# Patient Record
Sex: FEMALE | Race: ASIAN | NOT HISPANIC OR LATINO | URBAN - METROPOLITAN AREA
[De-identification: names, ages, dates, MRNs, and addresses within clinical notes are randomized per-mention and may not be internally consistent; named-entity substitution may affect disease eponyms.]

---

## 2021-01-17 ENCOUNTER — INPATIENT (INPATIENT)
Facility: HOSPITAL | Age: 66
LOS: 5 days | Discharge: HOME | End: 2021-01-23
Attending: INTERNAL MEDICINE | Admitting: INTERNAL MEDICINE
Payer: MEDICAID

## 2021-01-17 VITALS
RESPIRATION RATE: 18 BRPM | DIASTOLIC BLOOD PRESSURE: 70 MMHG | HEART RATE: 117 BPM | SYSTOLIC BLOOD PRESSURE: 158 MMHG | TEMPERATURE: 100 F | OXYGEN SATURATION: 99 %

## 2021-01-17 DIAGNOSIS — Z90.49 ACQUIRED ABSENCE OF OTHER SPECIFIED PARTS OF DIGESTIVE TRACT: Chronic | ICD-10-CM

## 2021-01-17 DIAGNOSIS — K37 UNSPECIFIED APPENDICITIS: ICD-10-CM

## 2021-01-17 LAB
ALBUMIN SERPL ELPH-MCNC: 3.9 G/DL — SIGNIFICANT CHANGE UP (ref 3.3–5)
ALP SERPL-CCNC: 47 U/L — SIGNIFICANT CHANGE UP (ref 40–120)
ALT FLD-CCNC: 22 U/L — SIGNIFICANT CHANGE UP (ref 4–33)
ANION GAP SERPL CALC-SCNC: 14 MMOL/L — SIGNIFICANT CHANGE UP (ref 7–14)
APPEARANCE UR: CLEAR — SIGNIFICANT CHANGE UP
APTT BLD: 42.2 SEC — HIGH (ref 27–36.3)
AST SERPL-CCNC: 17 U/L — SIGNIFICANT CHANGE UP (ref 4–32)
BASE EXCESS BLDV CALC-SCNC: -2 MMOL/L — SIGNIFICANT CHANGE UP (ref -3–2)
BASOPHILS # BLD AUTO: 0.05 K/UL — SIGNIFICANT CHANGE UP (ref 0–0.2)
BASOPHILS NFR BLD AUTO: 0.3 % — SIGNIFICANT CHANGE UP (ref 0–2)
BILIRUB SERPL-MCNC: 1.8 MG/DL — HIGH (ref 0.2–1.2)
BILIRUB UR-MCNC: NEGATIVE — SIGNIFICANT CHANGE UP
BLD GP AB SCN SERPL QL: NEGATIVE — SIGNIFICANT CHANGE UP
BLOOD GAS VENOUS - CREATININE: 1.4 MG/DL — HIGH (ref 0.5–1.3)
BLOOD GAS VENOUS COMPREHENSIVE RESULT: SIGNIFICANT CHANGE UP
BUN SERPL-MCNC: 18 MG/DL — SIGNIFICANT CHANGE UP (ref 7–23)
CALCIUM SERPL-MCNC: 10.3 MG/DL — SIGNIFICANT CHANGE UP (ref 8.4–10.5)
CHLORIDE BLDV-SCNC: 102 MMOL/L — SIGNIFICANT CHANGE UP (ref 96–108)
CHLORIDE SERPL-SCNC: 94 MMOL/L — LOW (ref 98–107)
CO2 SERPL-SCNC: 20 MMOL/L — LOW (ref 22–31)
COLOR SPEC: SIGNIFICANT CHANGE UP
CREAT SERPL-MCNC: 1.36 MG/DL — HIGH (ref 0.5–1.3)
DIFF PNL FLD: NEGATIVE — SIGNIFICANT CHANGE UP
EOSINOPHIL # BLD AUTO: 0.03 K/UL — SIGNIFICANT CHANGE UP (ref 0–0.5)
EOSINOPHIL NFR BLD AUTO: 0.2 % — SIGNIFICANT CHANGE UP (ref 0–6)
GAS PNL BLDV: 126 MMOL/L — LOW (ref 136–146)
GLUCOSE BLDC GLUCOMTR-MCNC: 357 MG/DL — HIGH (ref 70–99)
GLUCOSE BLDC GLUCOMTR-MCNC: 360 MG/DL — HIGH (ref 70–99)
GLUCOSE BLDV-MCNC: 510 MG/DL — CRITICAL HIGH (ref 70–99)
GLUCOSE SERPL-MCNC: 459 MG/DL — CRITICAL HIGH (ref 70–99)
GLUCOSE UR QL: ABNORMAL
HCO3 BLDV-SCNC: 22 MMOL/L — SIGNIFICANT CHANGE UP (ref 20–27)
HCT VFR BLD CALC: 37.7 % — SIGNIFICANT CHANGE UP (ref 34.5–45)
HCT VFR BLDA CALC: 39.7 % — SIGNIFICANT CHANGE UP (ref 34.5–46.5)
HGB BLD CALC-MCNC: 12.9 G/DL — SIGNIFICANT CHANGE UP (ref 11.5–15.5)
HGB BLD-MCNC: 12.4 G/DL — SIGNIFICANT CHANGE UP (ref 11.5–15.5)
IANC: 16.36 K/UL — HIGH (ref 1.5–8.5)
IMM GRANULOCYTES NFR BLD AUTO: 0.7 % — SIGNIFICANT CHANGE UP (ref 0–1.5)
INR BLD: 1.32 RATIO — HIGH (ref 0.88–1.16)
KETONES UR-MCNC: NEGATIVE — SIGNIFICANT CHANGE UP
LACTATE BLDV-MCNC: 2 MMOL/L — SIGNIFICANT CHANGE UP (ref 0.5–2)
LEUKOCYTE ESTERASE UR-ACNC: NEGATIVE — SIGNIFICANT CHANGE UP
LIDOCAIN IGE QN: 36 U/L — SIGNIFICANT CHANGE UP (ref 7–60)
LYMPHOCYTES # BLD AUTO: 11.5 % — LOW (ref 13–44)
LYMPHOCYTES # BLD AUTO: 2.27 K/UL — SIGNIFICANT CHANGE UP (ref 1–3.3)
MCHC RBC-ENTMCNC: 29 PG — SIGNIFICANT CHANGE UP (ref 27–34)
MCHC RBC-ENTMCNC: 32.9 GM/DL — SIGNIFICANT CHANGE UP (ref 32–36)
MCV RBC AUTO: 88.3 FL — SIGNIFICANT CHANGE UP (ref 80–100)
MONOCYTES # BLD AUTO: 0.93 K/UL — HIGH (ref 0–0.9)
MONOCYTES NFR BLD AUTO: 4.7 % — SIGNIFICANT CHANGE UP (ref 2–14)
NEUTROPHILS # BLD AUTO: 16.36 K/UL — HIGH (ref 1.8–7.4)
NEUTROPHILS NFR BLD AUTO: 82.6 % — HIGH (ref 43–77)
NITRITE UR-MCNC: NEGATIVE — SIGNIFICANT CHANGE UP
NRBC # BLD: 0 /100 WBCS — SIGNIFICANT CHANGE UP
NRBC # FLD: 0 K/UL — SIGNIFICANT CHANGE UP
PCO2 BLDV: 39 MMHG — LOW (ref 41–51)
PH BLDV: 7.37 — SIGNIFICANT CHANGE UP (ref 7.32–7.43)
PH UR: 6 — SIGNIFICANT CHANGE UP (ref 5–8)
PLATELET # BLD AUTO: 269 K/UL — SIGNIFICANT CHANGE UP (ref 150–400)
PO2 BLDV: 38 MMHG — SIGNIFICANT CHANGE UP (ref 35–40)
POTASSIUM BLDV-SCNC: 4.1 MMOL/L — SIGNIFICANT CHANGE UP (ref 3.4–4.5)
POTASSIUM SERPL-MCNC: 4.4 MMOL/L — SIGNIFICANT CHANGE UP (ref 3.5–5.3)
POTASSIUM SERPL-SCNC: 4.4 MMOL/L — SIGNIFICANT CHANGE UP (ref 3.5–5.3)
PROT SERPL-MCNC: 7.7 G/DL — SIGNIFICANT CHANGE UP (ref 6–8.3)
PROT UR-MCNC: ABNORMAL
PROTHROM AB SERPL-ACNC: 15 SEC — HIGH (ref 10.6–13.6)
RBC # BLD: 4.27 M/UL — SIGNIFICANT CHANGE UP (ref 3.8–5.2)
RBC # FLD: 12.8 % — SIGNIFICANT CHANGE UP (ref 10.3–14.5)
RH IG SCN BLD-IMP: POSITIVE — SIGNIFICANT CHANGE UP
SAO2 % BLDV: 67.7 % — SIGNIFICANT CHANGE UP (ref 60–85)
SODIUM SERPL-SCNC: 128 MMOL/L — LOW (ref 135–145)
SP GR SPEC: 1.02 — SIGNIFICANT CHANGE UP (ref 1.01–1.02)
UROBILINOGEN FLD QL: SIGNIFICANT CHANGE UP
WBC # BLD: 19.78 K/UL — HIGH (ref 3.8–10.5)
WBC # FLD AUTO: 19.78 K/UL — HIGH (ref 3.8–10.5)

## 2021-01-17 PROCEDURE — 74177 CT ABD & PELVIS W/CONTRAST: CPT | Mod: 26

## 2021-01-17 PROCEDURE — 99285 EMERGENCY DEPT VISIT HI MDM: CPT

## 2021-01-17 PROCEDURE — 71045 X-RAY EXAM CHEST 1 VIEW: CPT | Mod: 26

## 2021-01-17 PROCEDURE — 99232 SBSQ HOSP IP/OBS MODERATE 35: CPT | Mod: GC

## 2021-01-17 RX ORDER — ACETAMINOPHEN 500 MG
650 TABLET ORAL ONCE
Refills: 0 | Status: COMPLETED | OUTPATIENT
Start: 2021-01-17 | End: 2021-01-17

## 2021-01-17 RX ORDER — SODIUM CHLORIDE 9 MG/ML
1000 INJECTION, SOLUTION INTRAVENOUS
Refills: 0 | Status: DISCONTINUED | OUTPATIENT
Start: 2021-01-17 | End: 2021-01-18

## 2021-01-17 RX ORDER — METOPROLOL TARTRATE 50 MG
5 TABLET ORAL EVERY 6 HOURS
Refills: 0 | Status: DISCONTINUED | OUTPATIENT
Start: 2021-01-17 | End: 2021-01-20

## 2021-01-17 RX ORDER — SODIUM CHLORIDE 9 MG/ML
1000 INJECTION, SOLUTION INTRAVENOUS ONCE
Refills: 0 | Status: COMPLETED | OUTPATIENT
Start: 2021-01-17 | End: 2021-01-17

## 2021-01-17 RX ORDER — DEXTROSE 50 % IN WATER 50 %
25 SYRINGE (ML) INTRAVENOUS ONCE
Refills: 0 | Status: DISCONTINUED | OUTPATIENT
Start: 2021-01-17 | End: 2021-01-18

## 2021-01-17 RX ORDER — ACETAMINOPHEN 500 MG
1000 TABLET ORAL EVERY 6 HOURS
Refills: 0 | Status: COMPLETED | OUTPATIENT
Start: 2021-01-17 | End: 2021-01-20

## 2021-01-17 RX ORDER — INSULIN GLARGINE 100 [IU]/ML
6 INJECTION, SOLUTION SUBCUTANEOUS AT BEDTIME
Refills: 0 | Status: DISCONTINUED | OUTPATIENT
Start: 2021-01-17 | End: 2021-01-18

## 2021-01-17 RX ORDER — PIPERACILLIN AND TAZOBACTAM 4; .5 G/20ML; G/20ML
3.38 INJECTION, POWDER, LYOPHILIZED, FOR SOLUTION INTRAVENOUS EVERY 8 HOURS
Refills: 0 | Status: DISCONTINUED | OUTPATIENT
Start: 2021-01-17 | End: 2021-01-18

## 2021-01-17 RX ORDER — GLUCAGON INJECTION, SOLUTION 0.5 MG/.1ML
1 INJECTION, SOLUTION SUBCUTANEOUS ONCE
Refills: 0 | Status: DISCONTINUED | OUTPATIENT
Start: 2021-01-17 | End: 2021-01-18

## 2021-01-17 RX ORDER — PIPERACILLIN AND TAZOBACTAM 4; .5 G/20ML; G/20ML
3.38 INJECTION, POWDER, LYOPHILIZED, FOR SOLUTION INTRAVENOUS ONCE
Refills: 0 | Status: COMPLETED | OUTPATIENT
Start: 2021-01-17 | End: 2021-01-17

## 2021-01-17 RX ORDER — DEXTROSE 50 % IN WATER 50 %
12.5 SYRINGE (ML) INTRAVENOUS ONCE
Refills: 0 | Status: DISCONTINUED | OUTPATIENT
Start: 2021-01-17 | End: 2021-01-18

## 2021-01-17 RX ORDER — DEXTROSE 50 % IN WATER 50 %
15 SYRINGE (ML) INTRAVENOUS ONCE
Refills: 0 | Status: DISCONTINUED | OUTPATIENT
Start: 2021-01-17 | End: 2021-01-18

## 2021-01-17 RX ORDER — MORPHINE SULFATE 50 MG/1
4 CAPSULE, EXTENDED RELEASE ORAL ONCE
Refills: 0 | Status: DISCONTINUED | OUTPATIENT
Start: 2021-01-17 | End: 2021-01-17

## 2021-01-17 RX ORDER — ENOXAPARIN SODIUM 100 MG/ML
40 INJECTION SUBCUTANEOUS DAILY
Refills: 0 | Status: DISCONTINUED | OUTPATIENT
Start: 2021-01-17 | End: 2021-01-21

## 2021-01-17 RX ORDER — SODIUM CHLORIDE 9 MG/ML
1000 INJECTION, SOLUTION INTRAVENOUS
Refills: 0 | Status: DISCONTINUED | OUTPATIENT
Start: 2021-01-17 | End: 2021-01-19

## 2021-01-17 RX ORDER — INSULIN LISPRO 100/ML
VIAL (ML) SUBCUTANEOUS EVERY 6 HOURS
Refills: 0 | Status: DISCONTINUED | OUTPATIENT
Start: 2021-01-17 | End: 2021-01-18

## 2021-01-17 RX ORDER — MORPHINE SULFATE 50 MG/1
0.5 CAPSULE, EXTENDED RELEASE ORAL EVERY 4 HOURS
Refills: 0 | Status: DISCONTINUED | OUTPATIENT
Start: 2021-01-17 | End: 2021-01-21

## 2021-01-17 RX ORDER — KETOROLAC TROMETHAMINE 30 MG/ML
15 SYRINGE (ML) INJECTION EVERY 6 HOURS
Refills: 0 | Status: DISCONTINUED | OUTPATIENT
Start: 2021-01-17 | End: 2021-01-19

## 2021-01-17 RX ADMIN — SODIUM CHLORIDE 1000 MILLILITER(S): 9 INJECTION, SOLUTION INTRAVENOUS at 16:22

## 2021-01-17 RX ADMIN — INSULIN GLARGINE 6 UNIT(S): 100 INJECTION, SOLUTION SUBCUTANEOUS at 23:08

## 2021-01-17 RX ADMIN — Medication 400 MILLIGRAM(S): at 21:43

## 2021-01-17 RX ADMIN — MORPHINE SULFATE 4 MILLIGRAM(S): 50 CAPSULE, EXTENDED RELEASE ORAL at 18:22

## 2021-01-17 RX ADMIN — PIPERACILLIN AND TAZOBACTAM 200 GRAM(S): 4; .5 INJECTION, POWDER, LYOPHILIZED, FOR SOLUTION INTRAVENOUS at 16:22

## 2021-01-17 RX ADMIN — Medication 650 MILLIGRAM(S): at 16:22

## 2021-01-17 RX ADMIN — Medication 5 MILLIGRAM(S): at 23:38

## 2021-01-17 RX ADMIN — PIPERACILLIN AND TAZOBACTAM 25 GRAM(S): 4; .5 INJECTION, POWDER, LYOPHILIZED, FOR SOLUTION INTRAVENOUS at 23:10

## 2021-01-17 RX ADMIN — SODIUM CHLORIDE 1000 MILLILITER(S): 9 INJECTION, SOLUTION INTRAVENOUS at 16:46

## 2021-01-17 RX ADMIN — PIPERACILLIN AND TAZOBACTAM 3.38 GRAM(S): 4; .5 INJECTION, POWDER, LYOPHILIZED, FOR SOLUTION INTRAVENOUS at 16:46

## 2021-01-17 RX ADMIN — Medication 5: at 23:09

## 2021-01-17 NOTE — ED PROVIDER NOTE - PROGRESS NOTE DETAILS
ED Attending: Saman Ramos signed out to me from Dr. Jefferson to f/u and reassess. CT showing perf appy.  Accepted to surgery svc.

## 2021-01-17 NOTE — ED ADULT TRIAGE NOTE - CHIEF COMPLAINT QUOTE
chest pain x 3 days that has progressed to constant mid sternal pain. PMH HTN, DM, and high cholesterol

## 2021-01-17 NOTE — H&P ADULT - NSHPPHYSICALEXAM_GEN_ALL_CORE
Ill appearing  Mildly tachycardic, regular   Normal respiratory effort on room air  Abd soft, distended, TTP in the bilateral lower quadrants   Ext: WWP, no edema

## 2021-01-17 NOTE — H&P ADULT - ASSESSMENT
65 year old F with perforated appendicitis with 3.2 x 2.7 cm abscess  -Admit to surgery   -NPO/IVF  -Pain Control   -Correct hyperglycemia   -IV antibiotics   -Obtain a med rec when pharmacy is open  -D/W Dr. Foster

## 2021-01-17 NOTE — H&P ADULT - ATTENDING COMMENTS
Perforated appendicitis with adjacent collection   -IR drainage  -abx  -trend exam and WBC  -will need interval appendectomy and colonoscopy if no recent exam.     Delvin Foster MD  Acute Care Surgery

## 2021-01-17 NOTE — ED PROVIDER NOTE - PHYSICAL EXAMINATION
General: NAD  HEENT: pupils equal and reactive, normal external ears bilaterally   Cardiac: RRR, no MRG appreciated  Resp: lungs clear to auscultation bilaterally, symmetric chest wall rise  Abd: soft, +TTP RLQ, nondistended  : no CVA tenderness  Neuro: Moving all extremities  Skin:  normal color for race

## 2021-01-17 NOTE — ED ADULT NURSE NOTE - OBJECTIVE STATEMENT
64 y/o female presents to ED complaining of abdominal pain radiating to chest. Pt a&ox3, Albanian speaking -  attempted #[id number] to be used however pt speaks dialect that  does not have. Pt endorses constant mid sternal chest pain. Abdomen appears to be distended. Pt endorses having a bowel movement this AM, pt is passing gas. Hx HTN, DM, HLD. 18g IV placed to LAC. 20g IV placed to L forearm. Labs obtained as ordered. Will monitor.

## 2021-01-17 NOTE — ED PROVIDER NOTE - OBJECTIVE STATEMENT
64yo Welsh speaking F hx DM, HTN here w/ cc of abd pain    States 2.5 days of abd pain prompting pt to come in. +Low grade fevers.     past surgeries: cholecystectomy

## 2021-01-17 NOTE — ED PROVIDER NOTE - CLINICAL SUMMARY MEDICAL DECISION MAKING FREE TEXT BOX
Ryan PGY-3:  66yo F here with RLQ pain, fever, tachy concern for appendicits, will give empiric abx, obtain CT AP and anticipate admission for management/treatment

## 2021-01-17 NOTE — ED PROVIDER NOTE - NS ED ROS FT
CONSTITUTIONAL: No fevers, no chills  Eyes: No vision changes  Cardiovascular: No Chest pain  Respiratory: No SOB  Gastrointestinal: +Abd pain  Genitourinary: no dysuria, no hematuria  SKIN: no rashes.  NEURO: negative   PSYCHIATRIC: no known mental health issues.  Endocrine: No unexplained weight gain

## 2021-01-17 NOTE — ED PROVIDER NOTE - ATTENDING CONTRIBUTION TO CARE
64 yo F past medical history dm, htn, with cp/ abd pain for appro 3 days. when asked where pain is  patient points to abdomen. + nausea no vomiting. Had normal BM today. patient febrile in ED. exam as above, abd distended with ttp in lower abd. concern for appe, perforated viscous. plan: abx, labs, ivf, ct, reassess. patient signed out to Dr Davison at the end of my shift.

## 2021-01-18 DIAGNOSIS — E11.65 TYPE 2 DIABETES MELLITUS WITH HYPERGLYCEMIA: ICD-10-CM

## 2021-01-18 DIAGNOSIS — E78.5 HYPERLIPIDEMIA, UNSPECIFIED: ICD-10-CM

## 2021-01-18 DIAGNOSIS — I10 ESSENTIAL (PRIMARY) HYPERTENSION: ICD-10-CM

## 2021-01-18 LAB
A1C WITH ESTIMATED AVERAGE GLUCOSE RESULT: 8.7 % — HIGH (ref 4–5.6)
ANION GAP SERPL CALC-SCNC: 11 MMOL/L — SIGNIFICANT CHANGE UP (ref 7–14)
APTT BLD: 41.6 SEC — HIGH (ref 27–36.3)
BUN SERPL-MCNC: 17 MG/DL — SIGNIFICANT CHANGE UP (ref 7–23)
CALCIUM SERPL-MCNC: 10 MG/DL — SIGNIFICANT CHANGE UP (ref 8.4–10.5)
CHLORIDE SERPL-SCNC: 100 MMOL/L — SIGNIFICANT CHANGE UP (ref 98–107)
CO2 SERPL-SCNC: 24 MMOL/L — SIGNIFICANT CHANGE UP (ref 22–31)
CREAT SERPL-MCNC: 1.33 MG/DL — HIGH (ref 0.5–1.3)
ESTIMATED AVERAGE GLUCOSE: 203 MG/DL — HIGH (ref 68–114)
GLUCOSE BLDC GLUCOMTR-MCNC: 137 MG/DL — HIGH (ref 70–99)
GLUCOSE BLDC GLUCOMTR-MCNC: 168 MG/DL — HIGH (ref 70–99)
GLUCOSE BLDC GLUCOMTR-MCNC: 202 MG/DL — HIGH (ref 70–99)
GLUCOSE BLDC GLUCOMTR-MCNC: 215 MG/DL — HIGH (ref 70–99)
GLUCOSE BLDC GLUCOMTR-MCNC: 245 MG/DL — HIGH (ref 70–99)
GLUCOSE BLDC GLUCOMTR-MCNC: 281 MG/DL — HIGH (ref 70–99)
GLUCOSE SERPL-MCNC: 249 MG/DL — HIGH (ref 70–99)
HCT VFR BLD CALC: 32.8 % — LOW (ref 34.5–45)
HCV AB S/CO SERPL IA: 0.07 S/CO — SIGNIFICANT CHANGE UP (ref 0–0.99)
HCV AB SERPL-IMP: SIGNIFICANT CHANGE UP
HGB BLD-MCNC: 10.7 G/DL — LOW (ref 11.5–15.5)
INR BLD: 1.28 RATIO — HIGH (ref 0.88–1.16)
MAGNESIUM SERPL-MCNC: 1.6 MG/DL — SIGNIFICANT CHANGE UP (ref 1.6–2.6)
MCHC RBC-ENTMCNC: 28.8 PG — SIGNIFICANT CHANGE UP (ref 27–34)
MCHC RBC-ENTMCNC: 32.6 GM/DL — SIGNIFICANT CHANGE UP (ref 32–36)
MCV RBC AUTO: 88.2 FL — SIGNIFICANT CHANGE UP (ref 80–100)
NRBC # BLD: 0 /100 WBCS — SIGNIFICANT CHANGE UP
NRBC # FLD: 0 K/UL — SIGNIFICANT CHANGE UP
PHOSPHATE SERPL-MCNC: 2.8 MG/DL — SIGNIFICANT CHANGE UP (ref 2.5–4.5)
PLATELET # BLD AUTO: 221 K/UL — SIGNIFICANT CHANGE UP (ref 150–400)
POTASSIUM SERPL-MCNC: 4.1 MMOL/L — SIGNIFICANT CHANGE UP (ref 3.5–5.3)
POTASSIUM SERPL-SCNC: 4.1 MMOL/L — SIGNIFICANT CHANGE UP (ref 3.5–5.3)
PROTHROM AB SERPL-ACNC: 14.6 SEC — HIGH (ref 10.6–13.6)
RBC # BLD: 3.72 M/UL — LOW (ref 3.8–5.2)
RBC # FLD: 12.9 % — SIGNIFICANT CHANGE UP (ref 10.3–14.5)
SARS-COV-2 IGG SERPL QL IA: NEGATIVE — SIGNIFICANT CHANGE UP
SARS-COV-2 IGM SERPL IA-ACNC: 0.06 INDEX — SIGNIFICANT CHANGE UP
SARS-COV-2 RNA SPEC QL NAA+PROBE: SIGNIFICANT CHANGE UP
SODIUM SERPL-SCNC: 135 MMOL/L — SIGNIFICANT CHANGE UP (ref 135–145)
WBC # BLD: 16.3 K/UL — HIGH (ref 3.8–10.5)
WBC # FLD AUTO: 16.3 K/UL — HIGH (ref 3.8–10.5)

## 2021-01-18 PROCEDURE — 99254 IP/OBS CNSLTJ NEW/EST MOD 60: CPT

## 2021-01-18 PROCEDURE — 99231 SBSQ HOSP IP/OBS SF/LOW 25: CPT

## 2021-01-18 RX ORDER — INSULIN GLARGINE 100 [IU]/ML
20 INJECTION, SOLUTION SUBCUTANEOUS AT BEDTIME
Refills: 0 | Status: DISCONTINUED | OUTPATIENT
Start: 2021-01-18 | End: 2021-01-18

## 2021-01-18 RX ORDER — OMEPRAZOLE 10 MG/1
1 CAPSULE, DELAYED RELEASE ORAL
Qty: 0 | Refills: 0 | DISCHARGE

## 2021-01-18 RX ORDER — SITAGLIPTIN AND METFORMIN HYDROCHLORIDE 500; 50 MG/1; MG/1
1 TABLET, FILM COATED ORAL
Qty: 0 | Refills: 0 | DISCHARGE

## 2021-01-18 RX ORDER — HYDROMORPHONE HYDROCHLORIDE 2 MG/ML
0.5 INJECTION INTRAMUSCULAR; INTRAVENOUS; SUBCUTANEOUS ONCE
Refills: 0 | Status: DISCONTINUED | OUTPATIENT
Start: 2021-01-18 | End: 2021-01-18

## 2021-01-18 RX ORDER — ONDANSETRON 8 MG/1
4 TABLET, FILM COATED ORAL EVERY 6 HOURS
Refills: 0 | Status: DISCONTINUED | OUTPATIENT
Start: 2021-01-18 | End: 2021-01-21

## 2021-01-18 RX ORDER — INSULIN GLARGINE 100 [IU]/ML
12 INJECTION, SOLUTION SUBCUTANEOUS AT BEDTIME
Refills: 0 | Status: DISCONTINUED | OUTPATIENT
Start: 2021-01-18 | End: 2021-01-23

## 2021-01-18 RX ORDER — METOPROLOL TARTRATE 50 MG
1 TABLET ORAL
Qty: 0 | Refills: 0 | DISCHARGE

## 2021-01-18 RX ORDER — INSULIN LISPRO 100/ML
VIAL (ML) SUBCUTANEOUS EVERY 6 HOURS
Refills: 0 | Status: DISCONTINUED | OUTPATIENT
Start: 2021-01-18 | End: 2021-01-20

## 2021-01-18 RX ORDER — MONTELUKAST 4 MG/1
1 TABLET, CHEWABLE ORAL
Qty: 0 | Refills: 0 | DISCHARGE

## 2021-01-18 RX ORDER — PIPERACILLIN AND TAZOBACTAM 4; .5 G/20ML; G/20ML
3.38 INJECTION, POWDER, LYOPHILIZED, FOR SOLUTION INTRAVENOUS EVERY 8 HOURS
Refills: 0 | Status: DISCONTINUED | OUTPATIENT
Start: 2021-01-18 | End: 2021-01-23

## 2021-01-18 RX ORDER — INSULIN LISPRO 100/ML
VIAL (ML) SUBCUTANEOUS
Refills: 0 | Status: DISCONTINUED | OUTPATIENT
Start: 2021-01-18 | End: 2021-01-18

## 2021-01-18 RX ADMIN — Medication 5 MILLIGRAM(S): at 13:30

## 2021-01-18 RX ADMIN — PIPERACILLIN AND TAZOBACTAM 25 GRAM(S): 4; .5 INJECTION, POWDER, LYOPHILIZED, FOR SOLUTION INTRAVENOUS at 13:30

## 2021-01-18 RX ADMIN — ENOXAPARIN SODIUM 40 MILLIGRAM(S): 100 INJECTION SUBCUTANEOUS at 13:30

## 2021-01-18 RX ADMIN — Medication 5 MILLIGRAM(S): at 05:46

## 2021-01-18 RX ADMIN — Medication 400 MILLIGRAM(S): at 19:43

## 2021-01-18 RX ADMIN — Medication 2: at 18:28

## 2021-01-18 RX ADMIN — INSULIN GLARGINE 12 UNIT(S): 100 INJECTION, SOLUTION SUBCUTANEOUS at 23:58

## 2021-01-18 RX ADMIN — Medication 5 MILLIGRAM(S): at 23:57

## 2021-01-18 RX ADMIN — PIPERACILLIN AND TAZOBACTAM 25 GRAM(S): 4; .5 INJECTION, POWDER, LYOPHILIZED, FOR SOLUTION INTRAVENOUS at 05:46

## 2021-01-18 RX ADMIN — Medication 3: at 07:38

## 2021-01-18 RX ADMIN — SODIUM CHLORIDE 100 MILLILITER(S): 9 INJECTION, SOLUTION INTRAVENOUS at 00:52

## 2021-01-18 RX ADMIN — Medication 4: at 13:40

## 2021-01-18 RX ADMIN — ONDANSETRON 4 MILLIGRAM(S): 8 TABLET, FILM COATED ORAL at 15:38

## 2021-01-18 RX ADMIN — Medication 5 MILLIGRAM(S): at 18:26

## 2021-01-18 RX ADMIN — MORPHINE SULFATE 0.5 MILLIGRAM(S): 50 CAPSULE, EXTENDED RELEASE ORAL at 05:28

## 2021-01-18 RX ADMIN — Medication 15 MILLIGRAM(S): at 10:19

## 2021-01-18 RX ADMIN — PIPERACILLIN AND TAZOBACTAM 25 GRAM(S): 4; .5 INJECTION, POWDER, LYOPHILIZED, FOR SOLUTION INTRAVENOUS at 23:57

## 2021-01-18 RX ADMIN — ONDANSETRON 4 MILLIGRAM(S): 8 TABLET, FILM COATED ORAL at 23:57

## 2021-01-18 RX ADMIN — Medication 400 MILLIGRAM(S): at 06:08

## 2021-01-18 NOTE — CONSULT NOTE ADULT - PROBLEM SELECTOR RECOMMENDATION 9
-Blood glucose target is 100 to 180  -Will adjust Lantus to 12 units QHS according to weight based calculation  -Will change Admelog moderate correction scale to Q6 while NPO  -Will adjust Admelog once on a diet to QAC  -As outpatient, will continue basal bolus regimen with doses to TBD but unlikely to benefit from Janumet (Januvia) since on insulin regimen. If renal function remains stable, will discharge on Metformin 1,000 mg BID  -Will follow closely for insulin regimen adjustments

## 2021-01-18 NOTE — PROGRESS NOTE ADULT - ASSESSMENT
65 year old F with perforated appendicitis with 3.2 x 2.7 cm abscess    -NPO/IVF  -Pain Control   -ISS and Lantus  -Correct hyperglycemia   -IV antibiotics   -Obtain a med rec

## 2021-01-18 NOTE — CONSULT NOTE ADULT - ASSESSMENT
65 y.o. female with h/o uncontrolled Type 2 DM, HTN, hyperlipidemia presents with perforated appendicitis and abscess being managed medically for now on IV antibiotics and hyperglycemia

## 2021-01-18 NOTE — PROGRESS NOTE ADULT - SUBJECTIVE AND OBJECTIVE BOX
GENERAL SURGERY PROGRESS NOTE    SUBJECTIVE  Patient seen and examined. Febrile overnight. Denies chills, SOB, chest pain.       OBJECTIVE    PHYSICAL EXAM  General: Appears well, NAD  CHEST: breathing comfortably  CV: appears well perfused  Abdomen: soft, TTP in RLQ, distended, no rebound or guarding  Extremities: Grossly symmetric    T(C): 37.3 (21 @ 06:45), Max: 39.8 (21 @ 16:36)  HR: 90 (21 @ 06:01) (78 - 117)  BP: 129/58 (21 @ 06:01) (129/58 - 175/66)  RR: 21 (21 @ 06:01) (18 - 30)  SpO2: 95% (21 @ 06:01) (93% - 100%)    21 @ 07:01  -  21 @ 07:00  --------------------------------------------------------  IN: 600 mL / OUT: 700 mL / NET: -100 mL        MEDICATIONS  acetaminophen  IVPB .. 1000 milliGRAM(s) IV Intermittent every 6 hours PRN  dextrose 40% Gel 15 Gram(s) Oral once  dextrose 5%. 1000 milliLiter(s) IV Continuous <Continuous>  dextrose 5%. 1000 milliLiter(s) IV Continuous <Continuous>  dextrose 50% Injectable 25 Gram(s) IV Push once  dextrose 50% Injectable 12.5 Gram(s) IV Push once  dextrose 50% Injectable 25 Gram(s) IV Push once  enoxaparin Injectable 40 milliGRAM(s) SubCutaneous daily  glucagon  Injectable 1 milliGRAM(s) IntraMuscular once  insulin glargine Injectable (LANTUS) 6 Unit(s) SubCutaneous at bedtime  insulin lispro (ADMELOG) corrective regimen sliding scale   SubCutaneous every 6 hours  ketorolac   Injectable 15 milliGRAM(s) IV Push every 6 hours PRN  lactated ringers. 1000 milliLiter(s) IV Continuous <Continuous>  metoprolol tartrate Injectable 5 milliGRAM(s) IV Push every 6 hours  morphine  - Injectable 0.5 milliGRAM(s) IV Push every 4 hours PRN  piperacillin/tazobactam IVPB.. 3.375 Gram(s) IV Intermittent every 8 hours      LABS                        12.4   19.78 )-----------( 269      ( 2021 15:57 )             37.7     -    128<L>  |  94<L>  |  18  ----------------------------<  459<HH>  4.4   |  20<L>  |  1.36<H>    Ca    10.3      2021 16:32    TPro  7.7  /  Alb  3.9  /  TBili  1.8<H>  /  DBili  x   /  AST  17  /  ALT  22  /  AlkPhos  47      PT/INR - ( 2021 15:57 )   PT: 15.0 sec;   INR: 1.32 ratio         PTT - ( 2021 15:57 )  PTT:42.2 sec  Urinalysis Basic - ( 2021 18:45 )    Color: Light Yellow / Appearance: Clear / S.018 / pH: x  Gluc: x / Ketone: Negative  / Bili: Negative / Urobili: <2 mg/dL   Blood: x / Protein: 30 mg/dL / Nitrite: Negative   Leuk Esterase: Negative / RBC: 2 /HPF / WBC 5 /HPF   Sq Epi: x / Non Sq Epi: 1 /HPF / Bacteria: Negative        RADIOLOGY & ADDITIONAL STUDIES    EXAM:  CT ABDOMEN AND PELVIS IC        PROCEDURE DATE:  2021         INTERPRETATION:  CLINICAL INFORMATION: Abdominal pain and distention, febrile.    COMPARISON: None.    PROCEDURE:  CT of the Abdomen and Pelvis was performed with intravenous contrast.  Intravenous contrast: 90 ml Omnipaque 350. 10 ml discarded.  Oral contrast: None.  Sagittal and coronal reformats were performed.    FINDINGS:  LOWER CHEST: Mild linear basilar atelectasis.    LIVER: Within normal limits.  BILE DUCTS: Normal caliber.  GALLBLADDER: Cholecystectomy.  SPLEEN: Within normal limits.  PANCREAS: Within normal limits.  ADRENALS: Within normal limits.  KIDNEYS/URETERS: Within normal limits.    BLADDER: Within normal limits.  REPRODUCTIVE ORGANS: Uterus and adnexa within normal limits.    BOWEL: No bowel obstruction. Appendix is distended, measuring up to 1.1 cm in diameter. There are extensive inflammatory changes in the right lower quadrant mesentery. There is also circumferential wall thickening of the terminal ileum, possibly reactive. There is a 3.2 x 2.7 cm fluid collection with minimal peripheral enhancement adjacent to the appendix in the right paracolic gutter with a small focus of extraluminal right lower quadrant air (601, 48).  PERITONEUM: Trace mesenteric fluid within the right lower quadrant.  VESSELS: Within normal limits.  RETROPERITONEUM/LYMPH NODES: No lymphadenopathy.  ABDOMINAL WALL: Bilateral soft tissue stranding in the anterior lower abdomen, ?related to subcutaneous injection.  BONES: Mild degenerative changes of the spine.    IMPRESSION:  Acute appendicitis with 3.2 cm developing periappendiceal fluid collection and small volume extraluminal gas, likely related to appendiceal perforation. Wall thickening of the adjacent terminal ileum, possibly reactive.              MICHAEL GRIFFIN MD; Fellow Radiology  This document has been electronically signed.  ISRAEL SARABIA MD; Attending Radiologist  This document has been electronically signed. 2021  6:20PM

## 2021-01-18 NOTE — CONSULT NOTE ADULT - ATTENDING COMMENTS
I reviewed the above and edited where appropriate.   Chart and relevant imaging reviewed. Small developing periappendiceal abscess, intimately involved with bowel loop.  Would defer percutaneous drainage, in the setting of downtrending WBC count and normal VS, as risk/benefit analysis of procedure is not in the patient's favor at this time.  -Continue conservative management, as per the primary team.    Please call IR if clinical situation changes and/or new information becomes available.

## 2021-01-18 NOTE — CONSULT NOTE ADULT - SUBJECTIVE AND OBJECTIVE BOX
Vascular & Interventional Radiology Brief Consult Note    Evaluate for Procedure: Periappendiceal collection drainage    HPI: 65 year old F with PMH of DM, HTN, HLD presents with 3 days of chest/epigastric and abdominal pain.  She reports nausea but no vomiting.  No diarrhea. CT A/P showing a 3.2 cm developing periappendiceal collection. IR consulted for drainage.    Allergies:   Medications (Abx/Cardiac/Anticoagulation/Blood Products)    metoprolol tartrate Injectable: 5 milliGRAM(s) IV Push (01-18 @ 05:46)  piperacillin/tazobactam IVPB..: 25 mL/Hr IV Intermittent (01-18 @ 05:46)  piperacillin/tazobactam IVPB...: 200 mL/Hr IV Intermittent (01-17 @ 16:22)    Data:    60  T(C): 36.6  HR: 76  BP: 120/61  RR: 20  SpO2: 97%    -WBC 16.30 / HgB 10.7 / Hct 32.8 / Plt 221  -Na 135 / Cl 100 / BUN 17 / Glucose 249  -K 4.1 / CO2 24 / Cr 1.33  -ALT -- / Alk Phos -- / T.Bili --  -INR 1.28 / PTT 41.6    Imaging: CT A/P showing a 3.2 cm developing periappendiceal collection.    Assessment/Plan:   -65 year old F with a 3.2 cm developing periappendiceal collection. IR consulted for drainage.  -Patient with improving WBC count on antibiotic/conservative therapy.  -Will hold off to an IR intervention for now in favor of non-invasive therapy.  -Please reconsult if patient continues to be febrile and WBC count persistently remains elevated/uptrending.  -Discussed with IR attending Dr. Lopez, Vascular & Interventional Radiology Brief Consult Note    Evaluate for Procedure: Periappendiceal collection drainage    HPI: 65 year old F with PMH of DM, HTN, HLD presents with 3 days of chest/epigastric and abdominal pain.  She reports nausea but no vomiting.  No diarrhea. CT A/P showing a 3.2 cm developing periappendiceal collection. IR consulted for drainage.    Allergies:   Medications (Abx/Cardiac/Anticoagulation/Blood Products)    metoprolol tartrate Injectable: 5 milliGRAM(s) IV Push (01-18 @ 05:46)  piperacillin/tazobactam IVPB..: 25 mL/Hr IV Intermittent (01-18 @ 05:46)  piperacillin/tazobactam IVPB...: 200 mL/Hr IV Intermittent (01-17 @ 16:22)    Data:    60  T(C): 36.6  HR: 76  BP: 120/61  RR: 20  SpO2: 97%    -WBC 16.30 / HgB 10.7 / Hct 32.8 / Plt 221  -Na 135 / Cl 100 / BUN 17 / Glucose 249  -K 4.1 / CO2 24 / Cr 1.33  -ALT -- / Alk Phos -- / T.Bili --  -INR 1.28 / PTT 41.6    Imaging: CT A/P showing a 3.2 cm developing periappendiceal collection.    Assessment/Plan:   -65 year old F with a 3.2 cm developing periappendiceal collection. IR consulted for percutaneous drainage.  -Patient with downtrending WBC count on antibiotic/conservative therapy.  -Will hold off to an IR intervention for now in favor of non-invasive therapy.  -Please reconsult if patient continues to be febrile and WBC count persistently remains elevated/uptrending.

## 2021-01-18 NOTE — PROGRESS NOTE ADULT - ATTENDING COMMENTS
Pt seen and examined.  Agree with resident eval and plan. Pt seen and examined.  Pt admitted with sepsis and acute renal failure secondary to perforated appendicitis and abscess formation.  Pt responding to fluid resuscitation and IV antibiotics.  Continue present management.

## 2021-01-19 LAB
ANION GAP SERPL CALC-SCNC: 10 MMOL/L — SIGNIFICANT CHANGE UP (ref 7–14)
BLD GP AB SCN SERPL QL: NEGATIVE — SIGNIFICANT CHANGE UP
BUN SERPL-MCNC: 16 MG/DL — SIGNIFICANT CHANGE UP (ref 7–23)
CALCIUM SERPL-MCNC: 9.8 MG/DL — SIGNIFICANT CHANGE UP (ref 8.4–10.5)
CHLORIDE SERPL-SCNC: 101 MMOL/L — SIGNIFICANT CHANGE UP (ref 98–107)
CO2 SERPL-SCNC: 25 MMOL/L — SIGNIFICANT CHANGE UP (ref 22–31)
CREAT SERPL-MCNC: 1.31 MG/DL — HIGH (ref 0.5–1.3)
CULTURE RESULTS: SIGNIFICANT CHANGE UP
GLUCOSE BLDC GLUCOMTR-MCNC: 161 MG/DL — HIGH (ref 70–99)
GLUCOSE BLDC GLUCOMTR-MCNC: 186 MG/DL — HIGH (ref 70–99)
GLUCOSE BLDC GLUCOMTR-MCNC: 188 MG/DL — HIGH (ref 70–99)
GLUCOSE BLDC GLUCOMTR-MCNC: 217 MG/DL — HIGH (ref 70–99)
GLUCOSE SERPL-MCNC: 160 MG/DL — HIGH (ref 70–99)
HCT VFR BLD CALC: 32.9 % — LOW (ref 34.5–45)
HGB BLD-MCNC: 10.6 G/DL — LOW (ref 11.5–15.5)
MAGNESIUM SERPL-MCNC: 1.8 MG/DL — SIGNIFICANT CHANGE UP (ref 1.6–2.6)
MCHC RBC-ENTMCNC: 29 PG — SIGNIFICANT CHANGE UP (ref 27–34)
MCHC RBC-ENTMCNC: 32.2 GM/DL — SIGNIFICANT CHANGE UP (ref 32–36)
MCV RBC AUTO: 89.9 FL — SIGNIFICANT CHANGE UP (ref 80–100)
NRBC # BLD: 0 /100 WBCS — SIGNIFICANT CHANGE UP
NRBC # FLD: 0 K/UL — SIGNIFICANT CHANGE UP
PHOSPHATE SERPL-MCNC: 3.6 MG/DL — SIGNIFICANT CHANGE UP (ref 2.5–4.5)
PLATELET # BLD AUTO: 240 K/UL — SIGNIFICANT CHANGE UP (ref 150–400)
POTASSIUM SERPL-MCNC: 4 MMOL/L — SIGNIFICANT CHANGE UP (ref 3.5–5.3)
POTASSIUM SERPL-SCNC: 4 MMOL/L — SIGNIFICANT CHANGE UP (ref 3.5–5.3)
RBC # BLD: 3.66 M/UL — LOW (ref 3.8–5.2)
RBC # FLD: 12.9 % — SIGNIFICANT CHANGE UP (ref 10.3–14.5)
RH IG SCN BLD-IMP: POSITIVE — SIGNIFICANT CHANGE UP
SODIUM SERPL-SCNC: 136 MMOL/L — SIGNIFICANT CHANGE UP (ref 135–145)
SPECIMEN SOURCE: SIGNIFICANT CHANGE UP
WBC # BLD: 13.67 K/UL — HIGH (ref 3.8–10.5)
WBC # FLD AUTO: 13.67 K/UL — HIGH (ref 3.8–10.5)

## 2021-01-19 PROCEDURE — 99232 SBSQ HOSP IP/OBS MODERATE 35: CPT

## 2021-01-19 PROCEDURE — 99231 SBSQ HOSP IP/OBS SF/LOW 25: CPT

## 2021-01-19 RX ORDER — SODIUM CHLORIDE 9 MG/ML
1000 INJECTION, SOLUTION INTRAVENOUS
Refills: 0 | Status: DISCONTINUED | OUTPATIENT
Start: 2021-01-19 | End: 2021-01-22

## 2021-01-19 RX ORDER — PANTOPRAZOLE SODIUM 20 MG/1
40 TABLET, DELAYED RELEASE ORAL DAILY
Refills: 0 | Status: DISCONTINUED | OUTPATIENT
Start: 2021-01-19 | End: 2021-01-23

## 2021-01-19 RX ORDER — MAGNESIUM SULFATE 500 MG/ML
2 VIAL (ML) INJECTION ONCE
Refills: 0 | Status: COMPLETED | OUTPATIENT
Start: 2021-01-19 | End: 2021-01-19

## 2021-01-19 RX ADMIN — SODIUM CHLORIDE 100 MILLILITER(S): 9 INJECTION, SOLUTION INTRAVENOUS at 07:27

## 2021-01-19 RX ADMIN — Medication 50 GRAM(S): at 11:49

## 2021-01-19 RX ADMIN — PIPERACILLIN AND TAZOBACTAM 25 GRAM(S): 4; .5 INJECTION, POWDER, LYOPHILIZED, FOR SOLUTION INTRAVENOUS at 14:13

## 2021-01-19 RX ADMIN — ONDANSETRON 4 MILLIGRAM(S): 8 TABLET, FILM COATED ORAL at 23:48

## 2021-01-19 RX ADMIN — Medication 5 MILLIGRAM(S): at 07:20

## 2021-01-19 RX ADMIN — Medication 5 MILLIGRAM(S): at 17:18

## 2021-01-19 RX ADMIN — Medication 2: at 12:50

## 2021-01-19 RX ADMIN — PIPERACILLIN AND TAZOBACTAM 25 GRAM(S): 4; .5 INJECTION, POWDER, LYOPHILIZED, FOR SOLUTION INTRAVENOUS at 07:20

## 2021-01-19 RX ADMIN — INSULIN GLARGINE 12 UNIT(S): 100 INJECTION, SOLUTION SUBCUTANEOUS at 23:49

## 2021-01-19 RX ADMIN — Medication 2: at 07:20

## 2021-01-19 RX ADMIN — PIPERACILLIN AND TAZOBACTAM 25 GRAM(S): 4; .5 INJECTION, POWDER, LYOPHILIZED, FOR SOLUTION INTRAVENOUS at 22:23

## 2021-01-19 RX ADMIN — ENOXAPARIN SODIUM 40 MILLIGRAM(S): 100 INJECTION SUBCUTANEOUS at 12:49

## 2021-01-19 RX ADMIN — Medication 2: at 18:15

## 2021-01-19 RX ADMIN — Medication 15 MILLIGRAM(S): at 12:50

## 2021-01-19 RX ADMIN — Medication 15 MILLIGRAM(S): at 22:24

## 2021-01-19 RX ADMIN — Medication 5 MILLIGRAM(S): at 23:48

## 2021-01-19 RX ADMIN — PANTOPRAZOLE SODIUM 40 MILLIGRAM(S): 20 TABLET, DELAYED RELEASE ORAL at 07:27

## 2021-01-19 RX ADMIN — Medication 15 MILLIGRAM(S): at 07:20

## 2021-01-19 RX ADMIN — Medication 4: at 23:48

## 2021-01-19 RX ADMIN — Medication 5 MILLIGRAM(S): at 12:51

## 2021-01-19 NOTE — PROGRESS NOTE ADULT - ATTENDING COMMENTS
Zaynab Calzada MD  pager  on 1/19/21  Other times:  Diabetes team: [phone] business hours  [phone] night/weekend

## 2021-01-19 NOTE — PROGRESS NOTE ADULT - SUBJECTIVE AND OBJECTIVE BOX
Diabetes  Follow up note    Interval History: pt remain NPO.  Reports abdominal pain.     MEDICATIONS  (STANDING):  dextrose 5% + lactated ringers. 1000 milliLiter(s) (100 mL/Hr) IV Continuous <Continuous>  enoxaparin Injectable 40 milliGRAM(s) SubCutaneous daily  insulin glargine Injectable (LANTUS) 12 Unit(s) SubCutaneous at bedtime  insulin lispro (ADMELOG) corrective regimen sliding scale   SubCutaneous every 6 hours  magnesium sulfate  IVPB 2 Gram(s) IV Intermittent once  metoprolol tartrate Injectable 5 milliGRAM(s) IV Push every 6 hours  pantoprazole  Injectable 40 milliGRAM(s) IV Push daily  piperacillin/tazobactam IVPB.. 3.375 Gram(s) IV Intermittent every 8 hours    MEDICATIONS  (PRN):  acetaminophen  IVPB .. 1000 milliGRAM(s) IV Intermittent every 6 hours PRN Mild Pain (1 - 3)  ketorolac   Injectable 15 milliGRAM(s) IV Push every 6 hours PRN Moderate Pain (4 - 6)  morphine  - Injectable 0.5 milliGRAM(s) IV Push every 4 hours PRN Severe Pain (7 - 10)  ondansetron Injectable 4 milliGRAM(s) IV Push every 6 hours PRN Nausea and/or Vomiting      Allergies    No Known Allergies    PHYSICAL EXAM:  VITALS: T(C): 36.6 (01-19-21 @ 10:12)  T(F): 97.9 (01-19-21 @ 10:12), Max: 99.9 (01-18-21 @ 14:26)  HR: 75 (01-19-21 @ 10:12) (75 - 90)  BP: 148/75 (01-19-21 @ 10:12) (133/58 - 159/66)  RR:  (18 - 22)  SpO2:  (94% - 99%)  Wt(kg): 60  GENERAL: Lying in bed in NAD,   EYES: No proptosis,  HEENT:  Atraumatic, Normocephalic,   CARDIOVASCULAR: No peripheral edema  GI: Soft, distended, tender on palpation      POCT Blood Glucose.: 161 mg/dL (01-19-21 @ 06:38)  POCT Blood Glucose.: 137 mg/dL (01-18-21 @ 23:17)  POCT Blood Glucose.: 168 mg/dL (01-18-21 @ 17:50)  POCT Blood Glucose.: 202 mg/dL (01-18-21 @ 13:33)  POCT Blood Glucose.: 215 mg/dL (01-18-21 @ 12:17)  POCT Blood Glucose.: 281 mg/dL (01-18-21 @ 07:24)  POCT Blood Glucose.: 245 mg/dL (01-18-21 @ 06:11)  POCT Blood Glucose.: 360 mg/dL (01-17-21 @ 22:48)  POCT Blood Glucose.: 357 mg/dL (01-17-21 @ 19:33)        01-19    136  |  101  |  16  ----------------------------<  160<H>  4.0   |  25  |  1.31<H>    EGFR if : 50<L>  EGFR if non : 43<L>    Ca    9.8      01-19  Mg     1.8     01-19  Phos  3.6     01-19    TPro  7.7  /  Alb  3.9  /  TBili  1.8<H>  /  DBili  x   /  AST  17  /  ALT  22  /  AlkPhos  47  01-17        A1C with Estimated Average Glucose Result: 8.7 % (01-18-21 @ 08:11)

## 2021-01-19 NOTE — PROGRESS NOTE ADULT - ASSESSMENT
65 y.o. female with  uncontrolled Type 2 DM, HTN, hyperlipidemia admitted  with perforated appendicitis and abscess being managed medically for now on IV antibiotics       T2DM, uncontrolled  Pt now with reasonable glucose control  -Blood glucose target is 100 to 180  -Continue Lantus 12 units at bedtime  -Continue  Admelog moderate correction scale  Q6 hours while NPO  -Adjust Admelog once on a diet to QAC  - Will need standing premeal admelog once eating  -Anticipate discharge on basal bolus insulin and Metformin 1,000 mg BID  Follow up with PMD post discharge

## 2021-01-19 NOTE — PROGRESS NOTE ADULT - ASSESSMENT
Assessment:   65 year old F with perforated appendicitis with 3.2 x 2.7 cm abscess    - NPO/IVF  - Pain Control   - Continue Zosyn  - If febrile again, will reassess indication for IR  - ISS and Lantus  - Appreciate Endocrine recs    Nathen Torres, PGY-1  B Team Surgery [id number]

## 2021-01-20 LAB
ANION GAP SERPL CALC-SCNC: 12 MMOL/L — SIGNIFICANT CHANGE UP (ref 7–14)
BUN SERPL-MCNC: 16 MG/DL — SIGNIFICANT CHANGE UP (ref 7–23)
CALCIUM SERPL-MCNC: 9.6 MG/DL — SIGNIFICANT CHANGE UP (ref 8.4–10.5)
CHLORIDE SERPL-SCNC: 102 MMOL/L — SIGNIFICANT CHANGE UP (ref 98–107)
CO2 SERPL-SCNC: 24 MMOL/L — SIGNIFICANT CHANGE UP (ref 22–31)
CREAT SERPL-MCNC: 1.62 MG/DL — HIGH (ref 0.5–1.3)
GLUCOSE BLDC GLUCOMTR-MCNC: 113 MG/DL — HIGH (ref 70–99)
GLUCOSE BLDC GLUCOMTR-MCNC: 115 MG/DL — HIGH (ref 70–99)
GLUCOSE BLDC GLUCOMTR-MCNC: 135 MG/DL — HIGH (ref 70–99)
GLUCOSE BLDC GLUCOMTR-MCNC: 155 MG/DL — HIGH (ref 70–99)
GLUCOSE BLDC GLUCOMTR-MCNC: 265 MG/DL — HIGH (ref 70–99)
GLUCOSE SERPL-MCNC: 133 MG/DL — HIGH (ref 70–99)
HCT VFR BLD CALC: 32.7 % — LOW (ref 34.5–45)
HGB BLD-MCNC: 10.4 G/DL — LOW (ref 11.5–15.5)
MAGNESIUM SERPL-MCNC: 2.4 MG/DL — SIGNIFICANT CHANGE UP (ref 1.6–2.6)
MCHC RBC-ENTMCNC: 28.7 PG — SIGNIFICANT CHANGE UP (ref 27–34)
MCHC RBC-ENTMCNC: 31.8 GM/DL — LOW (ref 32–36)
MCV RBC AUTO: 90.1 FL — SIGNIFICANT CHANGE UP (ref 80–100)
NRBC # BLD: 0 /100 WBCS — SIGNIFICANT CHANGE UP
NRBC # FLD: 0 K/UL — SIGNIFICANT CHANGE UP
PHOSPHATE SERPL-MCNC: 4 MG/DL — SIGNIFICANT CHANGE UP (ref 2.5–4.5)
PLATELET # BLD AUTO: 267 K/UL — SIGNIFICANT CHANGE UP (ref 150–400)
POTASSIUM SERPL-MCNC: 3.9 MMOL/L — SIGNIFICANT CHANGE UP (ref 3.5–5.3)
POTASSIUM SERPL-SCNC: 3.9 MMOL/L — SIGNIFICANT CHANGE UP (ref 3.5–5.3)
RBC # BLD: 3.63 M/UL — LOW (ref 3.8–5.2)
RBC # FLD: 12.9 % — SIGNIFICANT CHANGE UP (ref 10.3–14.5)
SODIUM SERPL-SCNC: 138 MMOL/L — SIGNIFICANT CHANGE UP (ref 135–145)
WBC # BLD: 10.11 K/UL — SIGNIFICANT CHANGE UP (ref 3.8–10.5)
WBC # FLD AUTO: 10.11 K/UL — SIGNIFICANT CHANGE UP (ref 3.8–10.5)

## 2021-01-20 PROCEDURE — 99232 SBSQ HOSP IP/OBS MODERATE 35: CPT

## 2021-01-20 PROCEDURE — 99231 SBSQ HOSP IP/OBS SF/LOW 25: CPT

## 2021-01-20 RX ORDER — INSULIN LISPRO 100/ML
2 VIAL (ML) SUBCUTANEOUS
Refills: 0 | Status: DISCONTINUED | OUTPATIENT
Start: 2021-01-20 | End: 2021-01-21

## 2021-01-20 RX ORDER — INSULIN LISPRO 100/ML
VIAL (ML) SUBCUTANEOUS
Refills: 0 | Status: DISCONTINUED | OUTPATIENT
Start: 2021-01-20 | End: 2021-01-23

## 2021-01-20 RX ORDER — METOPROLOL TARTRATE 50 MG
25 TABLET ORAL DAILY
Refills: 0 | Status: DISCONTINUED | OUTPATIENT
Start: 2021-01-20 | End: 2021-01-23

## 2021-01-20 RX ORDER — INSULIN LISPRO 100/ML
VIAL (ML) SUBCUTANEOUS AT BEDTIME
Refills: 0 | Status: DISCONTINUED | OUTPATIENT
Start: 2021-01-20 | End: 2021-01-23

## 2021-01-20 RX ORDER — ATORVASTATIN CALCIUM 80 MG/1
10 TABLET, FILM COATED ORAL AT BEDTIME
Refills: 0 | Status: DISCONTINUED | OUTPATIENT
Start: 2021-01-20 | End: 2021-01-23

## 2021-01-20 RX ADMIN — PANTOPRAZOLE SODIUM 40 MILLIGRAM(S): 20 TABLET, DELAYED RELEASE ORAL at 09:06

## 2021-01-20 RX ADMIN — Medication 400 MILLIGRAM(S): at 21:06

## 2021-01-20 RX ADMIN — Medication 2 UNIT(S): at 13:01

## 2021-01-20 RX ADMIN — INSULIN GLARGINE 12 UNIT(S): 100 INJECTION, SOLUTION SUBCUTANEOUS at 22:28

## 2021-01-20 RX ADMIN — ONDANSETRON 4 MILLIGRAM(S): 8 TABLET, FILM COATED ORAL at 05:55

## 2021-01-20 RX ADMIN — PIPERACILLIN AND TAZOBACTAM 25 GRAM(S): 4; .5 INJECTION, POWDER, LYOPHILIZED, FOR SOLUTION INTRAVENOUS at 05:55

## 2021-01-20 RX ADMIN — ATORVASTATIN CALCIUM 10 MILLIGRAM(S): 80 TABLET, FILM COATED ORAL at 21:06

## 2021-01-20 RX ADMIN — SODIUM CHLORIDE 70 MILLILITER(S): 9 INJECTION, SOLUTION INTRAVENOUS at 18:41

## 2021-01-20 RX ADMIN — MORPHINE SULFATE 0.5 MILLIGRAM(S): 50 CAPSULE, EXTENDED RELEASE ORAL at 05:55

## 2021-01-20 RX ADMIN — PIPERACILLIN AND TAZOBACTAM 25 GRAM(S): 4; .5 INJECTION, POWDER, LYOPHILIZED, FOR SOLUTION INTRAVENOUS at 21:06

## 2021-01-20 RX ADMIN — Medication 5 MILLIGRAM(S): at 05:55

## 2021-01-20 RX ADMIN — PIPERACILLIN AND TAZOBACTAM 25 GRAM(S): 4; .5 INJECTION, POWDER, LYOPHILIZED, FOR SOLUTION INTRAVENOUS at 12:59

## 2021-01-20 RX ADMIN — Medication 1: at 22:27

## 2021-01-20 RX ADMIN — ENOXAPARIN SODIUM 40 MILLIGRAM(S): 100 INJECTION SUBCUTANEOUS at 09:05

## 2021-01-20 RX ADMIN — Medication 25 MILLIGRAM(S): at 18:28

## 2021-01-20 RX ADMIN — Medication 2 UNIT(S): at 18:28

## 2021-01-20 RX ADMIN — Medication 5 MILLIGRAM(S): at 13:01

## 2021-01-20 NOTE — PROGRESS NOTE ADULT - SUBJECTIVE AND OBJECTIVE BOX
Interval Events:  - No acute events overnight    S: Patient seen and examined at bedside and doing OK. Still c/o RLQ pain that is causing her SOB. Endorses passing gas and had 2 non-bloody BM's since yesterday.  Denies fevers, chills, nausea, emesis, chest pain, SOB.    O: Vital Signs  T(C): 36.6 (01-20 @ 05:51), Max: 37.1 (01-19 @ 13:42)  HR: 73 (01-20 @ 05:51) (68 - 86)  BP: 127/60 (01-20 @ 05:51) (124/60 - 159/66)  RR: 17 (01-20 @ 05:51) (16 - 18)  SpO2: 100% (01-20 @ 05:51) (94% - 100%)  01-19-21 @ 07:01  -  01-20-21 @ 07:00  --------------------------------------------------------  IN:  Total IN: 0 mL    OUT:    Stool (mL): 2 mL    Voided (mL): 100 mL  Total OUT: 102 mL    Total NET: -102 mL        General: alert and oriented, NAD  Resp: airway patent, respirations unlabored  CVS: appears well perfused  Abdomen: soft, tender lower quadrants R>L, obese abdomen  Extremities: no edema  Skin: warm, dry, appropriate color                          10.6   13.67 )-----------( 240      ( 19 Jan 2021 07:36 )             32.9   01-19    136  |  101  |  16  ----------------------------<  160<H>  4.0   |  25  |  1.31<H>    Ca    9.8      19 Jan 2021 07:36  Phos  3.6     01-19  Mg     1.8     01-19     Interval Events:  - No acute events overnight    S: Patient seen and examined at bedside and doing OK. Still c/o RLQ pain that is causing her SOB. Endorses passing gas and had 2 non-bloody BM's since yesterday.  Denies fevers, chills, nausea, emesis, chest pain.    O: Vital Signs  T(C): 36.6 (01-20 @ 05:51), Max: 37.1 (01-19 @ 13:42)  HR: 73 (01-20 @ 05:51) (68 - 86)  BP: 127/60 (01-20 @ 05:51) (124/60 - 159/66)  RR: 17 (01-20 @ 05:51) (16 - 18)  SpO2: 100% (01-20 @ 05:51) (94% - 100%)  01-19-21 @ 07:01  -  01-20-21 @ 07:00  --------------------------------------------------------  IN:  Total IN: 0 mL    OUT:    Stool (mL): 2 mL    Voided (mL): 100 mL  Total OUT: 102 mL    Total NET: -102 mL        General: alert and oriented, NAD  Resp: airway patent, respirations unlabored  CVS: appears well perfused  Abdomen: soft, tender lower quadrants R>L, obese abdomen  Extremities: no edema  Skin: warm, dry, appropriate color                          10.6   13.67 )-----------( 240      ( 19 Jan 2021 07:36 )             32.9   01-19    136  |  101  |  16  ----------------------------<  160<H>  4.0   |  25  |  1.31<H>    Ca    9.8      19 Jan 2021 07:36  Phos  3.6     01-19  Mg     1.8     01-19

## 2021-01-20 NOTE — PROGRESS NOTE ADULT - ASSESSMENT
65 y.o. female with  uncontrolled Type 2 DM, HTN, hyperlipidemia admitted  with perforated appendicitis and abscess being managed medically for now on IV antibiotics       1. T2DM, uncontrolled, A1C with Estimated Average Glucose Result: 8.7 % (01-18-21 @ 08:11)  Pt now with reasonable glucose control  -Blood glucose target is 100 to 180  -Continue Lantus 12 units at bedtime  -Now on clear liquid diet, recommend to start Admelog 2 units tid with meals.   -Low does sliding scale.   -Anticipate discharge on basal bolus insulin and Metformin 1,000 mg BID  Follow up with PMD post discharge      2. HTN  Care as per primary team, currently on Metopolorl 5mg every 6 hours, transition to PO meds when stable.     3. HLD  Check lipid profile outpatient  LDL goal should be less than 70mg/dl.  65 y.o. female with  uncontrolled Type 2 DM, HTN, hyperlipidemia admitted  with perforated appendicitis and abscess being managed medically for now on IV antibiotics       1. T2DM, uncontrolled, A1C with Estimated Average Glucose Result: 8.7 % (01-18-21 @ 08:11)  Pt now with reasonable glucose control  -Blood glucose target is 100 to 180  -Continue Lantus 12 units at bedtime  -Now on clear liquid diet, but still with poor appetite, recommend to start Admelog 2 units tid with meals.   -Low does sliding scale.   -Anticipate discharge on basal bolus insulin and Metformin 1,000 mg BID  -Can follow up with PMD post discharge for diabetes care  - If patient wishes to establish endocrine follow up  ENDOCRINE FOLLOW UP  CALL PATIENT ACCESS SERVICES  [phone]  For all [facility] Endocrine Practices phone numbers and locations throughout [facility], and [place].      If patient wishes to follow up with [facility] Endocrinology at [place]    [facility]  [address] [place] [address], [address], [place] [address]  [phone]   Please call to schedule appointment with CDE/Nutritionist and MD appointment next available       2. HTN  Care as per primary team, currently on Metopolorl 5mg every 6 hours, transition to PO meds when stable.     3. HLD  Check lipid profile outpatient  LDL goal should be less than 70mg/dl.

## 2021-01-20 NOTE — PROGRESS NOTE ADULT - ASSESSMENT
65 year old F with perforated appendicitis with 3.2 x 2.7 cm abscess    PLAN:  - Advance to [facility]D  - Keep bed at 30 degrees or have pt stay in chair  - Pain Control   - C/w Zosyn  - If febrile again, will reassess indication for IR  - ISS and Lantus  - Appreciate Endocrine recs    Brandon Monson, PGY-1  B Team Surgery [id number]

## 2021-01-20 NOTE — PROGRESS NOTE ADULT - SUBJECTIVE AND OBJECTIVE BOX
Contact info:   Dilip Zapata MD  pager [phone], please provide 10 digit call back number.   Please note that this patient may be followed by another provider tomorrow.   If no answer or after hours, please contact [phone]    History:    Interval History/Subjective:    MEDICATIONS  (STANDING):  dextrose 5% + lactated ringers. 1000 milliLiter(s) (70 mL/Hr) IV Continuous <Continuous>  enoxaparin Injectable 40 milliGRAM(s) SubCutaneous daily  insulin glargine Injectable (LANTUS) 12 Unit(s) SubCutaneous at bedtime  insulin lispro (ADMELOG) corrective regimen sliding scale   SubCutaneous every 6 hours  metoprolol tartrate Injectable 5 milliGRAM(s) IV Push every 6 hours  pantoprazole  Injectable 40 milliGRAM(s) IV Push daily  piperacillin/tazobactam IVPB.. 3.375 Gram(s) IV Intermittent every 8 hours    MEDICATIONS  (PRN):  acetaminophen  IVPB .. 1000 milliGRAM(s) IV Intermittent every 6 hours PRN Mild Pain (1 - 3)  morphine  - Injectable 0.5 milliGRAM(s) IV Push every 4 hours PRN Severe Pain (7 - 10)  ondansetron Injectable 4 milliGRAM(s) IV Push every 6 hours PRN Nausea and/or Vomiting      Allergies    No Known Allergies    Intolerances      Review of Systems:  Constitutional: No fever  Eyes: No blurry vision  Neuro: No tremors  HEENT: No pain  Cardiovascular: No chest pain, palpitations  Respiratory: No SOB, no cough  GI: No nausea, vomiting, abdominal pain  : No dysuria  Skin: no rash  Psych: no depression  Endocrine: no polyuria, polydipsia  Hem/lymph: no swelling    ALL OTHER SYSTEMS REVIEWED AND NEGATIVE    PHYSICAL EXAM:  VITALS: T(C): 36.6 (01-20-21 @ 05:51)  T(F): 97.8 (01-20-21 @ 05:51), Max: 98.7 (01-19-21 @ 13:42)  HR: 70 (01-20-21 @ 06:30) (68 - 78)  BP: 125/60 (01-20-21 @ 06:30) (124/60 - 148/75)  RR:  (16 - 18)  SpO2:  (95% - 100%)  Wt(kg): --  GENERAL: NAD, well-groomed, well-developed  EYES: No proptosis, no lid lag, anicteric  HEENT:  Atraumatic, Normocephalic, moist mucous membranes  THYROID: Normal size, no palpable nodules  RESPIRATORY: Clear to auscultation bilaterally; No rales, rhonchi, wheezing, or rubs  CARDIOVASCULAR: Regular rate and rhythm; No murmurs; no peripheral edema  GI: Soft, nontender, non distended, normal bowel sounds  SKIN: Dry, intact, No rashes or lesions  MUSCULOSKELETAL: Full range of motion, normal strength  NEURO: sensation intact, extraocular movements intact, no tremor, normal reflexes  PSYCH: Alert and oriented x 3, normal affect, normal mood  CUSHING'S SIGNS: no striae    POCT Blood Glucose.: 155 mg/dL (01-20-21 @ 09:02)  POCT Blood Glucose.: 135 mg/dL (01-20-21 @ 06:51)  POCT Blood Glucose.: 217 mg/dL (01-19-21 @ 23:13)  POCT Blood Glucose.: 186 mg/dL (01-19-21 @ 17:40)  POCT Blood Glucose.: 188 mg/dL (01-19-21 @ 12:11)  POCT Blood Glucose.: 161 mg/dL (01-19-21 @ 06:38)  POCT Blood Glucose.: 137 mg/dL (01-18-21 @ 23:17)  POCT Blood Glucose.: 168 mg/dL (01-18-21 @ 17:50)  POCT Blood Glucose.: 202 mg/dL (01-18-21 @ 13:33)  POCT Blood Glucose.: 215 mg/dL (01-18-21 @ 12:17)  POCT Blood Glucose.: 281 mg/dL (01-18-21 @ 07:24)  POCT Blood Glucose.: 245 mg/dL (01-18-21 @ 06:11)  POCT Blood Glucose.: 360 mg/dL (01-17-21 @ 22:48)  POCT Blood Glucose.: 357 mg/dL (01-17-21 @ 19:33)      01-20    138  |  102  |  16  ----------------------------<  133<H>  3.9   |  24  |  1.62<H>    EGFR if : 38<L>  EGFR if non : 33<L>    Ca    9.6      01-20  Mg     2.4     01-20  Phos  4.0     01-20    TPro  7.7  /  Alb  3.9  /  TBili  1.8<H>  /  DBili  x   /  AST  17  /  ALT  22  /  AlkPhos  47  01-17    Diet, Clear Liquid:   Consistent Carbohydrate Evening Snack (CSTCHOSN) (01-20-21 @ 07:03)      Thyroid Function Tests:                     Contact info:   Dilip Zapata MD  pager [phone], please provide 10 digit call back number.   Please note that this patient may be followed by another provider tomorrow.   If no answer or after hours, please contact [phone]      Interval History/Subjective: Patient states that she has dry mouth and states that her stomach is bothering her a little bit.  Spoke with nursing staff and states that she didn't eat much breakfast.      MEDICATIONS  (STANDING):  dextrose 5% + lactated ringers. 1000 milliLiter(s) (70 mL/Hr) IV Continuous <Continuous>  enoxaparin Injectable 40 milliGRAM(s) SubCutaneous daily  insulin glargine Injectable (LANTUS) 12 Unit(s) SubCutaneous at bedtime  insulin lispro (ADMELOG) corrective regimen sliding scale   SubCutaneous every 6 hours  metoprolol tartrate Injectable 5 milliGRAM(s) IV Push every 6 hours  pantoprazole  Injectable 40 milliGRAM(s) IV Push daily  piperacillin/tazobactam IVPB.. 3.375 Gram(s) IV Intermittent every 8 hours    MEDICATIONS  (PRN):  acetaminophen  IVPB .. 1000 milliGRAM(s) IV Intermittent every 6 hours PRN Mild Pain (1 - 3)  morphine  - Injectable 0.5 milliGRAM(s) IV Push every 4 hours PRN Severe Pain (7 - 10)  ondansetron Injectable 4 milliGRAM(s) IV Push every 6 hours PRN Nausea and/or Vomiting      Allergies    No Known Allergies    Intolerances      Review of Systems:  Constitutional: No fever  Eyes: No blurry vision  Neuro: No tremors  HEENT: No pain  Cardiovascular: No chest pain, palpitations  Respiratory: No SOB, no cough  GI: No nausea, vomiting, abdominal pain  : No dysuria  Skin: no rash  Psych: no depression  Endocrine: no polyuria, polydipsia  Hem/lymph: no swelling    ALL OTHER SYSTEMS REVIEWED AND NEGATIVE    PHYSICAL EXAM:  VITALS: T(C): 36.6 (01-20-21 @ 05:51)  T(F): 97.8 (01-20-21 @ 05:51), Max: 98.7 (01-19-21 @ 13:42)  HR: 70 (01-20-21 @ 06:30) (68 - 78)  BP: 125/60 (01-20-21 @ 06:30) (124/60 - 148/75)  RR:  (16 - 18)  SpO2:  (95% - 100%)  Wt(kg): --  GENERAL: NAD, well-groomed, well-developed  EYES: No proptosis, no lid lag, anicteric  HEENT:  Atraumatic, Normocephalic, moist mucous membranes  THYROID: Normal size, no palpable nodules  GI: Soft, nontender, non distended, normal bowel sounds  SKIN: Dry, intact, No rashes or lesions  MUSCULOSKELETAL: Full range of motion, normal strength  NEURO: sensation intact, extraocular movements intact, no tremor, normal reflexes  PSYCH: Alert and oriented x 3, normal affect, normal mood    POCT Blood Glucose.: 155 mg/dL (01-20-21 @ 09:02)  POCT Blood Glucose.: 135 mg/dL (01-20-21 @ 06:51)  POCT Blood Glucose.: 217 mg/dL (01-19-21 @ 23:13)  POCT Blood Glucose.: 186 mg/dL (01-19-21 @ 17:40)  POCT Blood Glucose.: 188 mg/dL (01-19-21 @ 12:11)  POCT Blood Glucose.: 161 mg/dL (01-19-21 @ 06:38)  POCT Blood Glucose.: 137 mg/dL (01-18-21 @ 23:17)  POCT Blood Glucose.: 168 mg/dL (01-18-21 @ 17:50)  POCT Blood Glucose.: 202 mg/dL (01-18-21 @ 13:33)  POCT Blood Glucose.: 215 mg/dL (01-18-21 @ 12:17)  POCT Blood Glucose.: 281 mg/dL (01-18-21 @ 07:24)  POCT Blood Glucose.: 245 mg/dL (01-18-21 @ 06:11)  POCT Blood Glucose.: 360 mg/dL (01-17-21 @ 22:48)  POCT Blood Glucose.: 357 mg/dL (01-17-21 @ 19:33)      01-20    138  |  102  |  16  ----------------------------<  133<H>  3.9   |  24  |  1.62<H>    EGFR if : 38<L>  EGFR if non : 33<L>    Ca    9.6      01-20  Mg     2.4     01-20  Phos  4.0     01-20    TPro  7.7  /  Alb  3.9  /  TBili  1.8<H>  /  DBili  x   /  AST  17  /  ALT  22  /  AlkPhos  47  01-17    Diet, Clear Liquid:   Consistent Carbohydrate Evening Snack (CSTCHOSN) (01-20-21 @ 07:03)      Thyroid Function Tests:

## 2021-01-21 LAB
ANION GAP SERPL CALC-SCNC: 14 MMOL/L — SIGNIFICANT CHANGE UP (ref 7–14)
BUN SERPL-MCNC: 10 MG/DL — SIGNIFICANT CHANGE UP (ref 7–23)
CALCIUM SERPL-MCNC: 9.3 MG/DL — SIGNIFICANT CHANGE UP (ref 8.4–10.5)
CHLORIDE SERPL-SCNC: 102 MMOL/L — SIGNIFICANT CHANGE UP (ref 98–107)
CO2 SERPL-SCNC: 23 MMOL/L — SIGNIFICANT CHANGE UP (ref 22–31)
CREAT SERPL-MCNC: 1.41 MG/DL — HIGH (ref 0.5–1.3)
GLUCOSE BLDC GLUCOMTR-MCNC: 182 MG/DL — HIGH (ref 70–99)
GLUCOSE BLDC GLUCOMTR-MCNC: 185 MG/DL — HIGH (ref 70–99)
GLUCOSE BLDC GLUCOMTR-MCNC: 235 MG/DL — HIGH (ref 70–99)
GLUCOSE BLDC GLUCOMTR-MCNC: 279 MG/DL — HIGH (ref 70–99)
GLUCOSE SERPL-MCNC: 225 MG/DL — HIGH (ref 70–99)
HCT VFR BLD CALC: 31.2 % — LOW (ref 34.5–45)
HGB BLD-MCNC: 10 G/DL — LOW (ref 11.5–15.5)
MAGNESIUM SERPL-MCNC: 2.1 MG/DL — SIGNIFICANT CHANGE UP (ref 1.6–2.6)
MCHC RBC-ENTMCNC: 28.7 PG — SIGNIFICANT CHANGE UP (ref 27–34)
MCHC RBC-ENTMCNC: 32.1 GM/DL — SIGNIFICANT CHANGE UP (ref 32–36)
MCV RBC AUTO: 89.7 FL — SIGNIFICANT CHANGE UP (ref 80–100)
NRBC # BLD: 0 /100 WBCS — SIGNIFICANT CHANGE UP
NRBC # FLD: 0 K/UL — SIGNIFICANT CHANGE UP
PHOSPHATE SERPL-MCNC: 4.6 MG/DL — HIGH (ref 2.5–4.5)
PLATELET # BLD AUTO: 288 K/UL — SIGNIFICANT CHANGE UP (ref 150–400)
POTASSIUM SERPL-MCNC: 3.7 MMOL/L — SIGNIFICANT CHANGE UP (ref 3.5–5.3)
POTASSIUM SERPL-SCNC: 3.7 MMOL/L — SIGNIFICANT CHANGE UP (ref 3.5–5.3)
RBC # BLD: 3.48 M/UL — LOW (ref 3.8–5.2)
RBC # FLD: 13.4 % — SIGNIFICANT CHANGE UP (ref 10.3–14.5)
SODIUM SERPL-SCNC: 139 MMOL/L — SIGNIFICANT CHANGE UP (ref 135–145)
WBC # BLD: 10.39 K/UL — SIGNIFICANT CHANGE UP (ref 3.8–10.5)
WBC # FLD AUTO: 10.39 K/UL — SIGNIFICANT CHANGE UP (ref 3.8–10.5)

## 2021-01-21 PROCEDURE — 99232 SBSQ HOSP IP/OBS MODERATE 35: CPT

## 2021-01-21 PROCEDURE — 74177 CT ABD & PELVIS W/CONTRAST: CPT | Mod: 26

## 2021-01-21 PROCEDURE — 99231 SBSQ HOSP IP/OBS SF/LOW 25: CPT

## 2021-01-21 RX ORDER — ACETAMINOPHEN 500 MG
1000 TABLET ORAL ONCE
Refills: 0 | Status: COMPLETED | OUTPATIENT
Start: 2021-01-21 | End: 2021-01-21

## 2021-01-21 RX ORDER — ACETAMINOPHEN 500 MG
650 TABLET ORAL EVERY 6 HOURS
Refills: 0 | Status: DISCONTINUED | OUTPATIENT
Start: 2021-01-21 | End: 2021-01-22

## 2021-01-21 RX ORDER — ACETAMINOPHEN 500 MG
650 TABLET ORAL ONCE
Refills: 0 | Status: COMPLETED | OUTPATIENT
Start: 2021-01-21 | End: 2021-01-21

## 2021-01-21 RX ORDER — INSULIN LISPRO 100/ML
4 VIAL (ML) SUBCUTANEOUS
Refills: 0 | Status: DISCONTINUED | OUTPATIENT
Start: 2021-01-22 | End: 2021-01-23

## 2021-01-21 RX ORDER — OXYCODONE HYDROCHLORIDE 5 MG/1
10 TABLET ORAL EVERY 6 HOURS
Refills: 0 | Status: DISCONTINUED | OUTPATIENT
Start: 2021-01-21 | End: 2021-01-22

## 2021-01-21 RX ORDER — OXYCODONE HYDROCHLORIDE 5 MG/1
5 TABLET ORAL EVERY 6 HOURS
Refills: 0 | Status: DISCONTINUED | OUTPATIENT
Start: 2021-01-21 | End: 2021-01-22

## 2021-01-21 RX ORDER — INSULIN LISPRO 100/ML
4 VIAL (ML) SUBCUTANEOUS
Refills: 0 | Status: DISCONTINUED | OUTPATIENT
Start: 2021-01-21 | End: 2021-01-23

## 2021-01-21 RX ORDER — OXYCODONE HYDROCHLORIDE 5 MG/1
5 TABLET ORAL EVERY 4 HOURS
Refills: 0 | Status: DISCONTINUED | OUTPATIENT
Start: 2021-01-21 | End: 2021-01-21

## 2021-01-21 RX ADMIN — Medication 1: at 17:54

## 2021-01-21 RX ADMIN — ENOXAPARIN SODIUM 40 MILLIGRAM(S): 100 INJECTION SUBCUTANEOUS at 12:44

## 2021-01-21 RX ADMIN — PIPERACILLIN AND TAZOBACTAM 25 GRAM(S): 4; .5 INJECTION, POWDER, LYOPHILIZED, FOR SOLUTION INTRAVENOUS at 04:49

## 2021-01-21 RX ADMIN — PIPERACILLIN AND TAZOBACTAM 25 GRAM(S): 4; .5 INJECTION, POWDER, LYOPHILIZED, FOR SOLUTION INTRAVENOUS at 21:07

## 2021-01-21 RX ADMIN — Medication 4 UNIT(S): at 17:54

## 2021-01-21 RX ADMIN — Medication 2 UNIT(S): at 10:00

## 2021-01-21 RX ADMIN — ATORVASTATIN CALCIUM 10 MILLIGRAM(S): 80 TABLET, FILM COATED ORAL at 21:07

## 2021-01-21 RX ADMIN — Medication 650 MILLIGRAM(S): at 21:07

## 2021-01-21 RX ADMIN — PANTOPRAZOLE SODIUM 40 MILLIGRAM(S): 20 TABLET, DELAYED RELEASE ORAL at 12:44

## 2021-01-21 RX ADMIN — Medication 400 MILLIGRAM(S): at 08:13

## 2021-01-21 RX ADMIN — Medication 2: at 10:01

## 2021-01-21 RX ADMIN — Medication 650 MILLIGRAM(S): at 19:04

## 2021-01-21 RX ADMIN — INSULIN GLARGINE 12 UNIT(S): 100 INJECTION, SOLUTION SUBCUTANEOUS at 22:38

## 2021-01-21 RX ADMIN — PIPERACILLIN AND TAZOBACTAM 25 GRAM(S): 4; .5 INJECTION, POWDER, LYOPHILIZED, FOR SOLUTION INTRAVENOUS at 14:31

## 2021-01-21 RX ADMIN — Medication 3: at 12:48

## 2021-01-21 RX ADMIN — Medication 25 MILLIGRAM(S): at 19:04

## 2021-01-21 NOTE — PROGRESS NOTE ADULT - SUBJECTIVE AND OBJECTIVE BOX
B Team Surgery Daily AM Progress Note    Vital Signs Last 24 Hrs  T(C): 36.4 (21 Jan 2021 04:46), Max: 37.7 (20 Jan 2021 17:41)  T(F): 97.6 (21 Jan 2021 04:46), Max: 99.9 (20 Jan 2021 17:41)  HR: 57 (21 Jan 2021 04:46) (57 - 74)  BP: 138/60 (21 Jan 2021 04:46) (134/59 - 165/75)  RR: 16 (21 Jan 2021 04:46) (16 - 18)  SpO2: 97% (21 Jan 2021 04:46) (93% - 99%)      SUBJECTIVE: Patient seen and examined by team on morning rounds. Patient still complaining of RLQ pain, passing flatus and having BM. Patient is tolerating regular diet. Denies nausea, vomitting, fever chills, SOB, chest pain, and palpitations      General Appearance: Appears well, NAD  Neck: Supple  Chest: Equal expansion bilaterally, equal breath sounds  CV: Pulse regular presently  Abdomen: soft, tender in RLQ, obese abdomen  Extremities: Grossly symmetric, SCD's in place     I&O's Summary    20 Jan 2021 07:01  -  21 Jan 2021 07:00  --------------------------------------------------------  IN: 2720 mL / OUT: 200 mL / NET: 2520 mL      I&O's Detail    20 Jan 2021 07:01  -  21 Jan 2021 07:00  --------------------------------------------------------  IN:    dextrose 5% + lactated ringers: 1400 mL    IV PiggyBack: 100 mL    Oral Fluid: 1220 mL  Total IN: 2720 mL    OUT:    Voided (mL): 200 mL  Total OUT: 200 mL    Total NET: 2520 mL          MEDICATIONS  (STANDING):  atorvastatin 10 milliGRAM(s) Oral at bedtime  dextrose 5% + lactated ringers. 1000 milliLiter(s) (70 mL/Hr) IV Continuous <Continuous>  enoxaparin Injectable 40 milliGRAM(s) SubCutaneous daily  insulin glargine Injectable (LANTUS) 12 Unit(s) SubCutaneous at bedtime  insulin lispro (ADMELOG) corrective regimen sliding scale   SubCutaneous three times a day before meals  insulin lispro (ADMELOG) corrective regimen sliding scale   SubCutaneous at bedtime  insulin lispro Injectable (ADMELOG) 2 Unit(s) SubCutaneous three times a day before meals  metoprolol succinate ER 25 milliGRAM(s) Oral daily  pantoprazole  Injectable 40 milliGRAM(s) IV Push daily  piperacillin/tazobactam IVPB.. 3.375 Gram(s) IV Intermittent every 8 hours    MEDICATIONS  (PRN):  morphine  - Injectable 0.5 milliGRAM(s) IV Push every 4 hours PRN Severe Pain (7 - 10)  ondansetron Injectable 4 milliGRAM(s) IV Push every 6 hours PRN Nausea and/or Vomiting      LABS:                        10.4   10.11 )-----------( 267      ( 20 Jan 2021 07:36 )             32.7     01-20    138  |  102  |  16  ----------------------------<  133<H>  3.9   |  24  |  1.62<H>    Ca    9.6      20 Jan 2021 07:36  Phos  4.0     01-20  Mg     2.4     01-20            RADIOLOGY & ADDITIONAL STUDIES:

## 2021-01-21 NOTE — PROGRESS NOTE ADULT - SUBJECTIVE AND OBJECTIVE BOX
Chief Complaint: f/u DM2    History:  Patient seen at bedside this morning, reports not eating much this morning. No nausea/vomiting. BG above goal today. Started on regular diet yesterday, made NPO today for CT.      MEDICATIONS  (STANDING):  atorvastatin 10 milliGRAM(s) Oral at bedtime  dextrose 5% + lactated ringers. 1000 milliLiter(s) (70 mL/Hr) IV Continuous <Continuous>  enoxaparin Injectable 40 milliGRAM(s) SubCutaneous daily  insulin glargine Injectable (LANTUS) 12 Unit(s) SubCutaneous at bedtime  insulin lispro (ADMELOG) corrective regimen sliding scale   SubCutaneous three times a day before meals  insulin lispro (ADMELOG) corrective regimen sliding scale   SubCutaneous at bedtime  insulin lispro Injectable (ADMELOG) 2 Unit(s) SubCutaneous three times a day before meals  metoprolol succinate ER 25 milliGRAM(s) Oral daily  pantoprazole  Injectable 40 milliGRAM(s) IV Push daily  piperacillin/tazobactam IVPB.. 3.375 Gram(s) IV Intermittent every 8 hours    MEDICATIONS  (PRN):  morphine  - Injectable 0.5 milliGRAM(s) IV Push every 4 hours PRN Severe Pain (7 - 10)  ondansetron Injectable 4 milliGRAM(s) IV Push every 6 hours PRN Nausea and/or Vomiting      Allergies  No Known Allergies    Review of Systems:  ALL OTHER SYSTEMS REVIEWED AND NEGATIVE    PHYSICAL EXAM:  VITALS: T(C): 36.7 (01-21-21 @ 14:31)  T(F): 98 (01-21-21 @ 14:31), Max: 99.9 (01-20-21 @ 17:41)  HR: 72 (01-21-21 @ 14:31) (57 - 74)  BP: 164/78 (01-21-21 @ 14:31) (134/59 - 164/78)  RR:  (16 - 18)  SpO2:  (93% - 100%)  Wt(kg): --  GENERAL: NAD, well-developed  EYES: No proptosis, anicteric  HEENT:  Atraumatic, Normocephalic  RESPIRATORY: + air movement bilaterally, no respiratory distress   GI: Soft, nontender, non distended    POCT Blood Glucose.: 279 mg/dL (01-21-21 @ 12:43) no standing Admelog, A 3   POCT Blood Glucose.: 235 mg/dL (01-21-21 @ 09:45) A 2, A 2  POCT Blood Glucose.: 265 mg/dL (01-20-21 @ 22:15) L 12, A 1  POCT Blood Glucose.: 115 mg/dL (01-20-21 @ 17:50) A 2  POCT Blood Glucose.: 113 mg/dL (01-20-21 @ 12:07) A 2  POCT Blood Glucose.: 155 mg/dL (01-20-21 @ 09:02)  POCT Blood Glucose.: 135 mg/dL (01-20-21 @ 06:51)  POCT Blood Glucose.: 217 mg/dL (01-19-21 @ 23:13)  POCT Blood Glucose.: 186 mg/dL (01-19-21 @ 17:40)  POCT Blood Glucose.: 188 mg/dL (01-19-21 @ 12:11)  POCT Blood Glucose.: 161 mg/dL (01-19-21 @ 06:38)  POCT Blood Glucose.: 137 mg/dL (01-18-21 @ 23:17)  POCT Blood Glucose.: 168 mg/dL (01-18-21 @ 17:50)      01-21    139  |  102  |  10  ----------------------------<  225<H>  3.7   |  23  |  1.41<H>    EGFR if : 46<L>  EGFR if non : 39<L>    Ca    9.3      01-21  Mg     2.1     01-21  Phos  4.6     01-21

## 2021-01-21 NOTE — PROGRESS NOTE ADULT - ASSESSMENT
65 year old F with perforated appendicitis with 3.2 x 2.7 cm abscess    PLAN:  - NPO for CT AP with IV/PO contrast  - resume regular diet after CT scan  - Keep bed at 30 degrees or have pt stay in chair  - Pain Control   - C/w Zosyn  - ISS and Lantus  - Appreciate Endocrine recs      B Team Surgery   #[id number]

## 2021-01-21 NOTE — PROGRESS NOTE ADULT - ATTENDING COMMENTS
Jenniffer Fuentes MD   Pager # [phone]  On evenings and weekends, please call the office at [phone] or page endocrine fellow on call. Please note that this patient may be followed by different provider tomorrow. If no answer, contact the office.

## 2021-01-21 NOTE — PROGRESS NOTE ADULT - ASSESSMENT
65 y.o. female with  uncontrolled Type 2 DM, HTN, hyperlipidemia admitted  with perforated appendicitis and abscess being managed medically for now on IV antibiotics       1. T2DM, uncontrolled, A1C with Estimated Average Glucose Result: 8.7 % (01-18-21 @ 08:11)  -Blood glucose target is 100 to 180  -Continue Lantus 12 units at bedtime  -Now on regular diet, increase Admelog to 4 units before meals   -Low does sliding scale.   -Anticipate discharge on basal bolus insulin and Metformin 1,000 mg BID  -Can follow up with PMD post discharge for diabetes care  - If patient wishes to establish endocrine follow up  ENDOCRINE FOLLOW UP  CALL PATIENT ACCESS SERVICES  [phone]  For all [facility] Endocrine Practices phone numbers and locations throughout [place], [place], and [place].      If patient wishes to follow up with [facility] Endocrinology at [place]    [facility]  [address] [place] [address], [address], [place] [address]  [phone]   Please call to schedule appointment with CDE/Nutritionist and MD appointment next available       2. HTN  BP above goal, on Metoprolol, adjust as needed     3. HLD  Check lipid profile outpatient  LDL goal should be less than 70mg/dl.  65 y.o. female with  uncontrolled Type 2 DM, HTN, hyperlipidemia admitted  with perforated appendicitis and abscess being managed medically for now on IV antibiotics       1. T2DM, uncontrolled, A1C with Estimated Average Glucose Result: 8.7 % (01-18-21 @ 08:11)  -Blood glucose target is 100 to 180  -Continue Lantus 12 units at bedtime  -Now on regular diet, increase Admelog to 4 units before meals   -Low does sliding scale.   -Anticipate discharge on basal bolus insulin and Metformin 1,000 mg BID  -Can follow up with PMD post discharge for diabetes care  - If patient wishes to establish endocrine follow up  ENDOCRINE FOLLOW UP  CALL PATIENT ACCESS SERVICES  [phone]  For all [facility] Endocrine Practices phone numbers and locations throughout [place], [place], and [place].      If patient wishes to follow up with [facility] Endocrinology at [place]    [facility]  [address] [place] [address], [address], [place] [address]  [phone]   Please call to schedule appointment with CDE/Nutritionist and MD appointment next available       2. HTN  BP above goal, on Metoprolol, adjust as needed     3. HLD  c/w Lipitor 10 mg daily

## 2021-01-22 LAB
ANION GAP SERPL CALC-SCNC: 13 MMOL/L — SIGNIFICANT CHANGE UP (ref 7–14)
APTT BLD: 42.2 SEC — HIGH (ref 27–36.3)
BUN SERPL-MCNC: 5 MG/DL — LOW (ref 7–23)
CALCIUM SERPL-MCNC: 9.3 MG/DL — SIGNIFICANT CHANGE UP (ref 8.4–10.5)
CHLORIDE SERPL-SCNC: 104 MMOL/L — SIGNIFICANT CHANGE UP (ref 98–107)
CO2 SERPL-SCNC: 23 MMOL/L — SIGNIFICANT CHANGE UP (ref 22–31)
CREAT SERPL-MCNC: 1.19 MG/DL — SIGNIFICANT CHANGE UP (ref 0.5–1.3)
CULTURE RESULTS: SIGNIFICANT CHANGE UP
CULTURE RESULTS: SIGNIFICANT CHANGE UP
GLUCOSE BLDC GLUCOMTR-MCNC: 159 MG/DL — HIGH (ref 70–99)
GLUCOSE BLDC GLUCOMTR-MCNC: 167 MG/DL — HIGH (ref 70–99)
GLUCOSE BLDC GLUCOMTR-MCNC: 189 MG/DL — HIGH (ref 70–99)
GLUCOSE BLDC GLUCOMTR-MCNC: 312 MG/DL — HIGH (ref 70–99)
GLUCOSE SERPL-MCNC: 152 MG/DL — HIGH (ref 70–99)
HCT VFR BLD CALC: 31.9 % — LOW (ref 34.5–45)
HGB BLD-MCNC: 10 G/DL — LOW (ref 11.5–15.5)
INR BLD: 1.2 RATIO — HIGH (ref 0.88–1.16)
MAGNESIUM SERPL-MCNC: 1.9 MG/DL — SIGNIFICANT CHANGE UP (ref 1.6–2.6)
MCHC RBC-ENTMCNC: 28.7 PG — SIGNIFICANT CHANGE UP (ref 27–34)
MCHC RBC-ENTMCNC: 31.3 GM/DL — LOW (ref 32–36)
MCV RBC AUTO: 91.4 FL — SIGNIFICANT CHANGE UP (ref 80–100)
NRBC # BLD: 0 /100 WBCS — SIGNIFICANT CHANGE UP
NRBC # FLD: 0 K/UL — SIGNIFICANT CHANGE UP
PHOSPHATE SERPL-MCNC: 4 MG/DL — SIGNIFICANT CHANGE UP (ref 2.5–4.5)
PLATELET # BLD AUTO: 374 K/UL — SIGNIFICANT CHANGE UP (ref 150–400)
POTASSIUM SERPL-MCNC: 3.7 MMOL/L — SIGNIFICANT CHANGE UP (ref 3.5–5.3)
POTASSIUM SERPL-SCNC: 3.7 MMOL/L — SIGNIFICANT CHANGE UP (ref 3.5–5.3)
PROTHROM AB SERPL-ACNC: 13.6 SEC — SIGNIFICANT CHANGE UP (ref 10.6–13.6)
RBC # BLD: 3.49 M/UL — LOW (ref 3.8–5.2)
RBC # FLD: 13.6 % — SIGNIFICANT CHANGE UP (ref 10.3–14.5)
SARS-COV-2 RNA SPEC QL NAA+PROBE: SIGNIFICANT CHANGE UP
SODIUM SERPL-SCNC: 140 MMOL/L — SIGNIFICANT CHANGE UP (ref 135–145)
SPECIMEN SOURCE: SIGNIFICANT CHANGE UP
SPECIMEN SOURCE: SIGNIFICANT CHANGE UP
WBC # BLD: 12.08 K/UL — HIGH (ref 3.8–10.5)
WBC # FLD AUTO: 12.08 K/UL — HIGH (ref 3.8–10.5)

## 2021-01-22 PROCEDURE — 99231 SBSQ HOSP IP/OBS SF/LOW 25: CPT

## 2021-01-22 PROCEDURE — 71045 X-RAY EXAM CHEST 1 VIEW: CPT | Mod: 26

## 2021-01-22 RX ORDER — ENOXAPARIN SODIUM 100 MG/ML
40 INJECTION SUBCUTANEOUS DAILY
Refills: 0 | Status: DISCONTINUED | OUTPATIENT
Start: 2021-01-22 | End: 2021-01-22

## 2021-01-22 RX ORDER — METOPROLOL TARTRATE 50 MG
25 TABLET ORAL ONCE
Refills: 0 | Status: COMPLETED | OUTPATIENT
Start: 2021-01-22 | End: 2021-01-22

## 2021-01-22 RX ORDER — ENOXAPARIN SODIUM 100 MG/ML
40 INJECTION SUBCUTANEOUS DAILY
Refills: 0 | Status: DISCONTINUED | OUTPATIENT
Start: 2021-01-22 | End: 2021-01-23

## 2021-01-22 RX ORDER — ACETAMINOPHEN 500 MG
1000 TABLET ORAL ONCE
Refills: 0 | Status: COMPLETED | OUTPATIENT
Start: 2021-01-22 | End: 2021-01-22

## 2021-01-22 RX ORDER — GABAPENTIN 400 MG/1
100 CAPSULE ORAL THREE TIMES A DAY
Refills: 0 | Status: DISCONTINUED | OUTPATIENT
Start: 2021-01-22 | End: 2021-01-23

## 2021-01-22 RX ORDER — MAGNESIUM SULFATE 500 MG/ML
1 VIAL (ML) INJECTION ONCE
Refills: 0 | Status: COMPLETED | OUTPATIENT
Start: 2021-01-22 | End: 2021-01-22

## 2021-01-22 RX ADMIN — ATORVASTATIN CALCIUM 10 MILLIGRAM(S): 80 TABLET, FILM COATED ORAL at 23:09

## 2021-01-22 RX ADMIN — Medication 4 UNIT(S): at 18:09

## 2021-01-22 RX ADMIN — GABAPENTIN 100 MILLIGRAM(S): 400 CAPSULE ORAL at 23:09

## 2021-01-22 RX ADMIN — Medication 1: at 18:08

## 2021-01-22 RX ADMIN — Medication 1: at 12:34

## 2021-01-22 RX ADMIN — ENOXAPARIN SODIUM 40 MILLIGRAM(S): 100 INJECTION SUBCUTANEOUS at 12:38

## 2021-01-22 RX ADMIN — Medication 400 MILLIGRAM(S): at 06:44

## 2021-01-22 RX ADMIN — PIPERACILLIN AND TAZOBACTAM 25 GRAM(S): 4; .5 INJECTION, POWDER, LYOPHILIZED, FOR SOLUTION INTRAVENOUS at 13:50

## 2021-01-22 RX ADMIN — PANTOPRAZOLE SODIUM 40 MILLIGRAM(S): 20 TABLET, DELAYED RELEASE ORAL at 12:35

## 2021-01-22 RX ADMIN — INSULIN GLARGINE 12 UNIT(S): 100 INJECTION, SOLUTION SUBCUTANEOUS at 23:10

## 2021-01-22 RX ADMIN — Medication 25 MILLIGRAM(S): at 06:44

## 2021-01-22 RX ADMIN — PIPERACILLIN AND TAZOBACTAM 25 GRAM(S): 4; .5 INJECTION, POWDER, LYOPHILIZED, FOR SOLUTION INTRAVENOUS at 06:47

## 2021-01-22 RX ADMIN — Medication 4 UNIT(S): at 12:33

## 2021-01-22 RX ADMIN — Medication 25 MILLIGRAM(S): at 17:36

## 2021-01-22 RX ADMIN — Medication 2: at 23:09

## 2021-01-22 RX ADMIN — PIPERACILLIN AND TAZOBACTAM 25 GRAM(S): 4; .5 INJECTION, POWDER, LYOPHILIZED, FOR SOLUTION INTRAVENOUS at 22:54

## 2021-01-22 RX ADMIN — Medication 100 GRAM(S): at 12:33

## 2021-01-22 NOTE — PROVIDER CONTACT NOTE (OTHER) - ACTION/TREATMENT ORDERED:
As per Sandee Black PA hold standing 4 units due pre-meal and 1 unit as per sliding scale. PA to change FS order to Q6H. Safety maintained. Handoff given to day RN.
IV tylenol

## 2021-01-22 NOTE — CHART NOTE - NSCHARTNOTEFT_GEN_A_CORE
Vascular & Interventional Radiology Brief Consult Note    Evaluate for Procedure: Drain Collection    HPI: 65y Female with PMH of DM, HTN, HLD and perforated appendicitis.    Allergies:   Medications (Abx/Cardiac/Anticoagulation/Blood Products)  enoxaparin Injectable: 40 milliGRAM(s) SubCutaneous (01-21 @ 12:44)  metoprolol succinate ER: 25 milliGRAM(s) Oral (01-22 @ 06:44)  metoprolol tartrate Injectable: 5 milliGRAM(s) IV Push (01-20 @ 13:01)  piperacillin/tazobactam IVPB..: 25 mL/Hr IV Intermittent (01-22 @ 06:47)    Data:    T(C): 36.3  HR: 65  BP: 156/60  RR: 18  SpO2: 97%    -WBC 12.08 / HgB 10.0 / Hct 31.9 / Plt 374  -Na 140 / Cl 104 / BUN 5 / Glucose 152  -K 3.7 / CO2 23 / Cr 1.19  -ALT -- / Alk Phos -- / T.Bili --  -INR 1.20 / PTT 42.2    Imaging: CT 1/21/2021: Small right lower quadrant abscess 4.3 x 2.2 cm.    Assessment/Plan:   -65y Female with a small right lower quadrant abscess  Labs, vitals, and imaging reviewed.  -Leukocytosis of 12.0    As patient is afebrile with stable vital signs, will defer intervention at this time.  Imaging demonstrates a very small right lower quadrant abscess.  If patient acutely decompensates or clinically worsens despite antibiotic treatment, please reconsult IR or page [phone].

## 2021-01-22 NOTE — PROVIDER CONTACT NOTE (OTHER) - ASSESSMENT
Patient A&Ox4, Kyrgyz speaking. VS stable. Patient NPO for possible IR procedure; AM [id number]. Team called to clarify whether to give insulin per sliding scale or hold. Patient also has standing 4 units pre meals.

## 2021-01-22 NOTE — PROGRESS NOTE ADULT - SUBJECTIVE AND OBJECTIVE BOX
Interval Events:    S: Patient doing well, denies fevers, chills, nausea, emesis, chest pain, SOB.    O: Vital Signs  T(C): 36.4 (01-22 @ 05:34), Max: 36.9 (01-21 @ 09:27)  HR: 75 (01-22 @ 05:34) (62 - 83)  BP: 176/71 (01-22 @ 05:34) (121/80 - 193/77)  RR: 18 (01-22 @ 05:34) (18 - 18)  SpO2: 100% (01-22 @ 05:34) (94% - 100%)  01-20-21 @ 07:01  -  01-21-21 @ 07:00  --------------------------------------------------------  IN:  Total IN: 0 mL    OUT:    Voided (mL): 200 mL  Total OUT: 200 mL    Total NET: -200 mL      01-21-21 @ 07:01  -  01-22-21 @ 06:53  --------------------------------------------------------  IN:  Total IN: 0 mL    OUT:    Stool (mL): 1 mL    Voided (mL): 1150 mL  Total OUT: 1151 mL    Total NET: -1151 mL        General: alert and oriented, NAD  Resp: airway patent, respirations unlabored  CVS: appears well perfused  Abdomen: soft, RUQ/LUQ/epigastric tenderness, nondistended  Extremities: no edema  Skin: warm, dry, appropriate color                          10.0   10.39 )-----------( 288      ( 21 Jan 2021 07:51 )             31.2   01-21    139  |  102  |  10  ----------------------------<  225<H>  3.7   |  23  |  1.41<H>    Ca    9.3      21 Jan 2021 07:51  Phos  4.6     01-21  Mg     2.1     01-21

## 2021-01-22 NOTE — PROGRESS NOTE ADULT - ASSESSMENT
65 year old F with perforated appendicitis with 3.2 x 2.7 cm abscess    PLAN:  - NPO for possible IR drainage  - Keep bed at 30 degrees or have pt stay in chair  - Pain Control: Tylenol ATC  - C/w Zosyn  - ISS and Lantus  - Appreciate Endocrine recs      B Team Surgery   #[id number] 65 year old F with perforated appendicitis with 3.2 x 2.7 cm abscess    PLAN:  - NPO for possible IR drainage  - Keep bed at 30 degrees or have pt stay in chair  - Pain Control: Tylenol ATC  - C/w Zosyn  - ISS and Lantus      B Team Surgery   #[id number]

## 2021-01-22 NOTE — PROVIDER CONTACT NOTE (OTHER) - SITUATION
patient NPO for possible IR procedure; AM [id number]. Called to clarify whether to give insulin as per sliding scale or hold.

## 2021-01-23 ENCOUNTER — TRANSCRIPTION ENCOUNTER (OUTPATIENT)
Age: 66
End: 2021-01-23

## 2021-01-23 VITALS
HEART RATE: 66 BPM | OXYGEN SATURATION: 96 % | DIASTOLIC BLOOD PRESSURE: 55 MMHG | SYSTOLIC BLOOD PRESSURE: 146 MMHG | RESPIRATION RATE: 17 BRPM | TEMPERATURE: 98 F

## 2021-01-23 LAB
ANION GAP SERPL CALC-SCNC: 14 MMOL/L — SIGNIFICANT CHANGE UP (ref 7–14)
BUN SERPL-MCNC: 5 MG/DL — LOW (ref 7–23)
CALCIUM SERPL-MCNC: 8.7 MG/DL — SIGNIFICANT CHANGE UP (ref 8.4–10.5)
CHLORIDE SERPL-SCNC: 102 MMOL/L — SIGNIFICANT CHANGE UP (ref 98–107)
CO2 SERPL-SCNC: 21 MMOL/L — LOW (ref 22–31)
CREAT SERPL-MCNC: 1.15 MG/DL — SIGNIFICANT CHANGE UP (ref 0.5–1.3)
GLUCOSE BLDC GLUCOMTR-MCNC: 175 MG/DL — HIGH (ref 70–99)
GLUCOSE BLDC GLUCOMTR-MCNC: 332 MG/DL — HIGH (ref 70–99)
GLUCOSE SERPL-MCNC: 250 MG/DL — HIGH (ref 70–99)
HCT VFR BLD CALC: 31.7 % — LOW (ref 34.5–45)
HGB BLD-MCNC: 9.9 G/DL — LOW (ref 11.5–15.5)
MAGNESIUM SERPL-MCNC: 2 MG/DL — SIGNIFICANT CHANGE UP (ref 1.6–2.6)
MCHC RBC-ENTMCNC: 28.1 PG — SIGNIFICANT CHANGE UP (ref 27–34)
MCHC RBC-ENTMCNC: 31.2 GM/DL — LOW (ref 32–36)
MCV RBC AUTO: 90.1 FL — SIGNIFICANT CHANGE UP (ref 80–100)
NRBC # BLD: 0 /100 WBCS — SIGNIFICANT CHANGE UP
NRBC # FLD: 0 K/UL — SIGNIFICANT CHANGE UP
PHOSPHATE SERPL-MCNC: 3.5 MG/DL — SIGNIFICANT CHANGE UP (ref 2.5–4.5)
PLATELET # BLD AUTO: 406 K/UL — HIGH (ref 150–400)
POTASSIUM SERPL-MCNC: 3.6 MMOL/L — SIGNIFICANT CHANGE UP (ref 3.5–5.3)
POTASSIUM SERPL-SCNC: 3.6 MMOL/L — SIGNIFICANT CHANGE UP (ref 3.5–5.3)
RBC # BLD: 3.52 M/UL — LOW (ref 3.8–5.2)
RBC # FLD: 13.9 % — SIGNIFICANT CHANGE UP (ref 10.3–14.5)
SODIUM SERPL-SCNC: 137 MMOL/L — SIGNIFICANT CHANGE UP (ref 135–145)
WBC # BLD: 12.06 K/UL — HIGH (ref 3.8–10.5)
WBC # FLD AUTO: 12.06 K/UL — HIGH (ref 3.8–10.5)

## 2021-01-23 PROCEDURE — 99231 SBSQ HOSP IP/OBS SF/LOW 25: CPT

## 2021-01-23 PROCEDURE — 99232 SBSQ HOSP IP/OBS MODERATE 35: CPT

## 2021-01-23 RX ORDER — INSULIN GLARGINE 100 [IU]/ML
16 INJECTION, SOLUTION SUBCUTANEOUS AT BEDTIME
Refills: 0 | Status: DISCONTINUED | OUTPATIENT
Start: 2021-01-23 | End: 2021-01-23

## 2021-01-23 RX ORDER — POTASSIUM CHLORIDE 20 MEQ
20 PACKET (EA) ORAL
Refills: 0 | Status: DISCONTINUED | OUTPATIENT
Start: 2021-01-23 | End: 2021-01-23

## 2021-01-23 RX ORDER — OXYCODONE HYDROCHLORIDE 5 MG/1
1 TABLET ORAL
Qty: 12 | Refills: 0
Start: 2021-01-23 | End: 2021-01-25

## 2021-01-23 RX ADMIN — Medication 20 MILLIEQUIVALENT(S): at 12:36

## 2021-01-23 RX ADMIN — Medication 1: at 12:34

## 2021-01-23 RX ADMIN — GABAPENTIN 100 MILLIGRAM(S): 400 CAPSULE ORAL at 05:35

## 2021-01-23 RX ADMIN — Medication 4 UNIT(S): at 09:33

## 2021-01-23 RX ADMIN — Medication 25 MILLIGRAM(S): at 05:35

## 2021-01-23 RX ADMIN — ENOXAPARIN SODIUM 40 MILLIGRAM(S): 100 INJECTION SUBCUTANEOUS at 12:36

## 2021-01-23 RX ADMIN — PIPERACILLIN AND TAZOBACTAM 25 GRAM(S): 4; .5 INJECTION, POWDER, LYOPHILIZED, FOR SOLUTION INTRAVENOUS at 05:34

## 2021-01-23 RX ADMIN — Medication 4 UNIT(S): at 12:35

## 2021-01-23 RX ADMIN — Medication 4: at 09:32

## 2021-01-23 RX ADMIN — PANTOPRAZOLE SODIUM 40 MILLIGRAM(S): 20 TABLET, DELAYED RELEASE ORAL at 12:36

## 2021-01-23 NOTE — DISCHARGE NOTE PROVIDER - NSDCCPCAREPLAN_GEN_ALL_CORE_FT
PRINCIPAL DISCHARGE DIAGNOSIS  Diagnosis: Appendicitis  Assessment and Plan of Treatment: 2 Weeks of Augmentin with Interval Appendectomy

## 2021-01-23 NOTE — DISCHARGE NOTE PROVIDER - NSDCFUADDINST_GEN_ALL_CORE_FT
Please take Augmentin to treat the infection associated with your appendicitis twice daily for 14 days.    Please Follow up with Dr. Foster in his clinic in two weeks.

## 2021-01-23 NOTE — PROGRESS NOTE ADULT - ASSESSMENT
65 y.o. female with  uncontrolled Type 2 DM, HTN, hyperlipidemia admitted  with perforated appendicitis and abscess being managed medically for now on IV antibiotics       1. T2DM, uncontrolled, A1C with Estimated Average Glucose Result: 8.7 % (01-18-21 @ 08:11)  -Blood glucose target is 100 to 180  -Increase Lantus to 16 units at bedtime  -Now on regular diet, increase Admelog to 4 units before meals   -Low does sliding scale.   -Anticipate discharge on basal bolus insulin and Metformin 1,000 mg BID  -Can follow up with PMD post discharge for diabetes care  - If patient wishes to establish endocrine follow up  ENDOCRINE FOLLOW UP  CALL PATIENT ACCESS SERVICES  [phone]  For all [facility] Endocrine Practices phone numbers and locations throughout [facility], and [place].      If patient wishes to follow up with [facility] Endocrinology at [place]    [facility]  [address] [place] [address], [address], [place] [address]  [phone]   Please call to schedule appointment with CDE/Nutritionist and MD appointment next available       2. HTN  BP above goal, on Metoprolol, adjust as needed     3. HLD  c/w Lipitor 10 mg daily  65 y.o. female with  uncontrolled Type 2 DM, HTN, hyperlipidemia admitted  with perforated appendicitis and abscess being managed medically for now on IV antibiotics       1. T2DM, uncontrolled, A1C with Estimated Average Glucose Result: 8.7 % (01-18-21 @ 08:11)  -Blood glucose target is 100 to 180  -Increase Lantus to 16 units at bedtime  -c/w increase Admelog to 4 units before meals   -Low does sliding scale.   -Anticipate discharge on basal bolus insulin and Metformin 1,000 mg BID  -Can follow up with PMD post discharge for diabetes care  - If patient wishes to establish endocrine follow up  ENDOCRINE FOLLOW UP  CALL PATIENT ACCESS SERVICES  [phone]  For all [facility] Endocrine Practices phone numbers and locations throughout [place], [place], and [place].      If patient wishes to follow up with [facility] Endocrinology at [place]    [facility]  [address] [place] [address], [address], [place] [address]  [phone]   Please call to schedule appointment with CDE/Nutritionist and MD appointment next available       2. HTN  BP above goal, on Metoprolol, adjust as needed     3. HLD  c/w Lipitor 10 mg daily

## 2021-01-23 NOTE — DISCHARGE NOTE NURSING/CASE MANAGEMENT/SOCIAL WORK - NSDCPNINST_GEN_ALL_CORE
patient is to call if temperature above 100.4  call if abd pain increases . please finish all antibiotics prescribed. follow up with surgeons office within two weeks . patient should take a probiotic along with antibiotic . follow up with endocrine md.

## 2021-01-23 NOTE — DISCHARGE NOTE NURSING/CASE MANAGEMENT/SOCIAL WORK - PATIENT PORTAL LINK FT
You can access the FollowMyHealth Patient Portal offered by [facility] by registering at the following website: [email or web address]. By joining [employer]’s FollowMyHealth portal, you will also be able to view your health information using other applications (apps) compatible with our system.

## 2021-01-23 NOTE — DISCHARGE NOTE PROVIDER - HOSPITAL COURSE
65 year old F with PMH of DM, HTN, HLD presented to ED with 3 days of chest/epigastric and abdominal pain.  She reports nausea but no vomiting.  No diarrhea.  Last BM was this am and was normal.  She has never had a colonoscopy. In the ED, vitals significant for fever. Abd exam with significant distension Labs significant for leukocytosis and hyperglycemia. CT consistent with perforated appendicitis with abscess.  EKG without evidence of ischemia. Trop within normal limits. Abscess was too small to be drained by IR so decision was made to treat with antibiotics and to follow up for an interval appendectomy.  Patient was started on diet and was tolerating without N/V or pain after eating.  While on the floor she was hemodynamically stable. Her pain was well controlled and patient felt comfortable going home with oral pain medication and 2 WEEKS OF AUGMENTIN to follow up with Dr. Foster in 2 weeks.

## 2021-01-23 NOTE — DISCHARGE NOTE PROVIDER - CARE PROVIDERS DIRECT ADDRESSES
[email or web address]

## 2021-01-23 NOTE — DISCHARGE NOTE PROVIDER - CARE PROVIDER_API CALL
Delvin Foster)  Surgery; Surgical Critical Care  [address], [address]  [place] [address]  Phone: [phone]  Fax: [phone]  Follow Up Time: 2 weeks

## 2021-01-23 NOTE — PROGRESS NOTE ADULT - REASON FOR ADMISSION
Appendicitis

## 2021-01-23 NOTE — PROGRESS NOTE ADULT - ASSESSMENT
65 year old F with perforated appendicitis with abscess    PLAN:  - Continue diet  - Switch to oral ABx, Augmentin for 2wk   - Oral pain medication  - F/u Dr. Foster in office 2 weeks  - TOYA and Lantus  - Will discuss with son in law as per pt request    B Team Surgery   #[id number]

## 2021-01-23 NOTE — DISCHARGE NOTE PROVIDER - NSDCMRMEDTOKEN_GEN_ALL_CORE_FT
atorvastatin 10 mg oral tablet: 1 tab(s) orally once a day  Augmentin 875 mg-125 mg oral tablet: 1 milligram(s) orally 2 times a day MDD:2 tabs  Calcium 500+D oral tablet, chewable: 1 tab(s) orally once a day  ferrous sulfate 325 mg (65 mg elemental iron) oral tablet: 1 tab(s) orally 2 times a day  folic acid 1 mg oral tablet: 1 tab(s) orally once a day  HumaLOG 100 units/mL injectable solution: 16 unit(s) injectable 3 times a day  Janumet 50 mg-500 mg oral tablet: 1 tab(s) orally 2 times a day  Lantus Solostar Pen 100 units/mL subcutaneous solution: 40 unit(s) subcutaneous once a day (at bedtime)  metFORMIN 850 mg oral tablet: 1 tab(s) orally once a day (in the morning)  metoprolol succinate 25 mg oral tablet, extended release: 1 tab(s) orally once a day  montelukast 10 mg oral tablet: 1 tab(s) orally once a day  omeprazole 40 mg oral delayed release capsule: 1 cap(s) orally once a day

## 2021-01-23 NOTE — PROGRESS NOTE ADULT - SUBJECTIVE AND OBJECTIVE BOX
Chief Complaint: f/u DM2    History:  Patient seen at bedside.  Doesnt like the food so appetite is fair. No n/v, tolerates po   MEDICATIONS  (STANDING):  atorvastatin 10 milliGRAM(s) Oral at bedtime  dextrose 5% + lactated ringers. 1000 milliLiter(s) (70 mL/Hr) IV Continuous <Continuous>  enoxaparin Injectable 40 milliGRAM(s) SubCutaneous daily  insulin glargine Injectable (LANTUS) 12 Unit(s) SubCutaneous at bedtime  insulin lispro (ADMELOG) corrective regimen sliding scale   SubCutaneous three times a day before meals  insulin lispro (ADMELOG) corrective regimen sliding scale   SubCutaneous at bedtime  insulin lispro Injectable (ADMELOG) 2 Unit(s) SubCutaneous three times a day before meals  metoprolol succinate ER 25 milliGRAM(s) Oral daily  pantoprazole  Injectable 40 milliGRAM(s) IV Push daily  piperacillin/tazobactam IVPB.. 3.375 Gram(s) IV Intermittent every 8 hours    MEDICATIONS  (PRN):  morphine  - Injectable 0.5 milliGRAM(s) IV Push every 4 hours PRN Severe Pain (7 - 10)  ondansetron Injectable 4 milliGRAM(s) IV Push every 6 hours PRN Nausea and/or Vomiting      Allergies  No Known Allergies    Review of Systems:  ALL OTHER SYSTEMS REVIEWED AND NEGATIVE    PHYSICAL EXAM:  Vital Signs Last 24 Hrs  T(C): 36.8 (23 Jan 2021 09:43), Max: 36.9 (23 Jan 2021 02:12)  T(F): 98.3 (23 Jan 2021 09:43), Max: 98.4 (23 Jan 2021 02:12)  HR: 66 (23 Jan 2021 09:43) (66 - 70)  BP: 146/55 (23 Jan 2021 09:43) (140/54 - 177/89)  BP(mean): --  RR: 17 (23 Jan 2021 09:43) (17 - 18)  SpO2: 96% (23 Jan 2021 09:43) (93% - 97%)  GENERAL: NAD, well-developed  EYES: No proptosis, anicteric  HEENT:  Atraumatic, Normocephalic  RESPIRATORY: + air movement bilaterally, no respiratory distress   GI: Soft, nontender, non distended      CAPILLARY BLOOD GLUCOSE:    POCT Blood Glucose.: 175 mg/dL (23 Jan 2021 12:19)  POCT Blood Glucose.: 332 mg/dL (23 Jan 2021 09:10)  POCT Blood Glucose.: 312 mg/dL (22 Jan 2021 22:47)  POCT Blood Glucose.: 189 mg/dL (22 Jan 2021 17:49)  POCT Blood Glucose.: 279 mg/dL (01-21-21 @ 12:43) no standing Admelog, A 3   POCT Blood Glucose.: 235 mg/dL (01-21-21 @ 09:45) A 2, A 2  POCT Blood Glucose.: 265 mg/dL (01-20-21 @ 22:15) L 12, A 1  POCT Blood Glucose.: 115 mg/dL (01-20-21 @ 17:50) A 2  POCT Blood Glucose.: 113 mg/dL (01-20-21 @ 12:07) A 2  POCT Blood Glucose.: 155 mg/dL (01-20-21 @ 09:02)  POCT Blood Glucose.: 135 mg/dL (01-20-21 @ 06:51)  POCT Blood Glucose.: 217 mg/dL (01-19-21 @ 23:13)  POCT Blood Glucose.: 186 mg/dL (01-19-21 @ 17:40)  POCT Blood Glucose.: 188 mg/dL (01-19-21 @ 12:11)  POCT Blood Glucose.: 161 mg/dL (01-19-21 @ 06:38)  POCT Blood Glucose.: 137 mg/dL (01-18-21 @ 23:17)  POCT Blood Glucose.: 168 mg/dL (01-18-21 @ 17:50)    01-23    137  |  102  |  5<L>  ----------------------------<  250<H>  3.6   |  21<L>  |  1.15    Ca    8.7      23 Jan 2021 07:47  Phos  3.5     01-23  Mg     2.0     01-23    A1C with Estimated Average Glucose (01.18.21 @ 08:11)    A1C with Estimated Average Glucose Result: 8.7%

## 2021-01-23 NOTE — PROGRESS NOTE ADULT - PROVIDER SPECIALTY LIST ADULT
Endocrinology
Surgery
Endocrinology
Surgery
Surgery
Endocrinology
Surgery
Endocrinology

## 2021-01-23 NOTE — PROGRESS NOTE ADULT - SUBJECTIVE AND OBJECTIVE BOX
GENERAL SURGERY PROGRESS NOTE    SUBJECTIVE  Patient seen and examined. No acute events overnight. Patient has continued pain in RLQ.  Tolerating diet.  Denies fever, chills, SOB, chest pain.         OBJECTIVE    PHYSICAL EXAM  General: Appears well, NAD  CHEST: breathing comfortably  CV: appears well perfused  Abdomen: soft, TTP in RLQ, distended, no rebound or guarding  Extremities: Grossly symmetric    T(C): 36.6 (01-23-21 @ 05:31), Max: 36.9 (01-23-21 @ 02:12)  HR: 70 (01-23-21 @ 05:31) (64 - 70)  BP: 149/55 (01-23-21 @ 05:31) (140/54 - 177/89)  RR: 18 (01-23-21 @ 05:31) (18 - 18)  SpO2: 93% (01-23-21 @ 05:31) (93% - 97%)    01-22-21 @ 07:01  -  01-23-21 @ 07:00  --------------------------------------------------------  IN: 70 mL / OUT: 1200 mL / NET: -1130 mL        MEDICATIONS  atorvastatin 10 milliGRAM(s) Oral at bedtime  enoxaparin Injectable 40 milliGRAM(s) SubCutaneous daily  gabapentin 100 milliGRAM(s) Oral three times a day  insulin glargine Injectable (LANTUS) 12 Unit(s) SubCutaneous at bedtime  insulin lispro (ADMELOG) corrective regimen sliding scale   SubCutaneous three times a day before meals  insulin lispro (ADMELOG) corrective regimen sliding scale   SubCutaneous at bedtime  insulin lispro Injectable (ADMELOG) 4 Unit(s) SubCutaneous before breakfast  insulin lispro Injectable (ADMELOG) 4 Unit(s) SubCutaneous before lunch  insulin lispro Injectable (ADMELOG) 4 Unit(s) SubCutaneous before dinner  metoprolol succinate ER 25 milliGRAM(s) Oral daily  pantoprazole  Injectable 40 milliGRAM(s) IV Push daily  piperacillin/tazobactam IVPB.. 3.375 Gram(s) IV Intermittent every 8 hours      LABS                        9.9    12.06 )-----------( 406      ( 23 Jan 2021 07:47 )             31.7     01-23    137  |  102  |  5<L>  ----------------------------<  250<H>  3.6   |  21<L>  |  1.15    Ca    8.7      23 Jan 2021 07:47  Phos  3.5     01-23  Mg     2.0     01-23      PT/INR - ( 22 Jan 2021 08:33 )   PT: 13.6 sec;   INR: 1.20 ratio         PTT - ( 22 Jan 2021 08:33 )  PTT:42.2 sec

## 2021-01-23 NOTE — PROGRESS NOTE ADULT - PROBLEM SELECTOR PROBLEM 1
Hyperlipidemia, unspecified hyperlipidemia type
Uncontrolled type 2 diabetes mellitus with hyperglycemia
Hyperlipidemia, unspecified hyperlipidemia type
Hyperlipidemia, unspecified hyperlipidemia type

## 2021-02-08 PROBLEM — E78.5 HYPERLIPIDEMIA, UNSPECIFIED: Chronic | Status: ACTIVE | Noted: 2021-01-17

## 2021-02-08 PROBLEM — E11.9 TYPE 2 DIABETES MELLITUS WITHOUT COMPLICATIONS: Chronic | Status: ACTIVE | Noted: 2021-01-17

## 2021-02-08 PROBLEM — I10 ESSENTIAL (PRIMARY) HYPERTENSION: Chronic | Status: ACTIVE | Noted: 2021-01-17

## 2021-02-16 PROBLEM — Z00.00 ENCOUNTER FOR PREVENTIVE HEALTH EXAMINATION: Status: ACTIVE | Noted: 2021-02-16

## 2021-02-18 ENCOUNTER — APPOINTMENT (OUTPATIENT)
Dept: SURGERY | Facility: CLINIC | Age: 66
End: 2021-02-18
Payer: MEDICAID

## 2021-02-18 VITALS
SYSTOLIC BLOOD PRESSURE: 150 MMHG | BODY MASS INDEX: 25.6 KG/M2 | TEMPERATURE: 96.5 F | HEART RATE: 81 BPM | DIASTOLIC BLOOD PRESSURE: 88 MMHG | HEIGHT: 60 IN | WEIGHT: 130.38 LBS

## 2021-02-18 PROCEDURE — 99212 OFFICE O/P EST SF 10 MIN: CPT

## 2021-02-18 NOTE — PLAN
[FreeTextEntry1] : s/p nonop tx of perforated appendicitis \par Healing well, abx finished. \par Needs colonoscopy prior to interval appendectomy as well as medical clearance\par f/u in 2-3 months for scheduling \par \par I spent 15min reviewing data, images and information. Greater than 50% of my time was spent in face to face discussion regarding operative planning, diet and activity.\par \par Delvin Foster MD\par Acute Care Surgery\par

## 2021-02-18 NOTE — HISTORY OF PRESENT ILLNESS
[de-identified] : Mrs. Sierra is a 64 y/o F with PMH of DM, HTN, HLD who was admitted for perforated appendicitis for which she underwent nonoperative mgmt. She has taken Augmentin for 2 weeks and has been feeling well with intermittent twinges of pain. She feels well currently, denying fever/chills, N/V or constipation/diarrhea.

## 2022-02-02 NOTE — H&P ADULT - HISTORY OF PRESENT ILLNESS
65 year old F with PMH of DM, HTN, HLD presents with 3 days of chest/epigastric and abdominal pain.  She reports nausea but no vomiting.  No diarrhea.  Last BM was this am and was normal.  She has never had a colonoscopy.     In the ED, vitals significant for fever. Abd exam with significant distension Labs significant for leukocytosis and hyperglycemia. CT consistent with perforated appendicitis with abscess.  EKG without evidence of ischemia. Trop within normal limits.  no chest pain no chest pain no chest pain no chest pain no chest pain no chest pain no chest pain no chest pain no chest pain

## 2022-02-20 ENCOUNTER — EMERGENCY (EMERGENCY)
Facility: HOSPITAL | Age: 67
LOS: 1 days | Discharge: HOME | End: 2022-02-20
Admitting: EMERGENCY MEDICINE
Payer: MEDICAID

## 2022-02-20 VITALS
TEMPERATURE: 99 F | HEART RATE: 102 BPM | SYSTOLIC BLOOD PRESSURE: 169 MMHG | RESPIRATION RATE: 18 BRPM | DIASTOLIC BLOOD PRESSURE: 74 MMHG | OXYGEN SATURATION: 100 %

## 2022-02-20 DIAGNOSIS — Z90.49 ACQUIRED ABSENCE OF OTHER SPECIFIED PARTS OF DIGESTIVE TRACT: Chronic | ICD-10-CM

## 2022-02-20 LAB
ALBUMIN SERPL ELPH-MCNC: 4.5 G/DL — SIGNIFICANT CHANGE UP (ref 3.3–5)
ALP SERPL-CCNC: 47 U/L — SIGNIFICANT CHANGE UP (ref 40–120)
ALT FLD-CCNC: 21 U/L — SIGNIFICANT CHANGE UP (ref 4–33)
ANION GAP SERPL CALC-SCNC: 13 MMOL/L — SIGNIFICANT CHANGE UP (ref 7–14)
APPEARANCE UR: CLEAR — SIGNIFICANT CHANGE UP
AST SERPL-CCNC: 17 U/L — SIGNIFICANT CHANGE UP (ref 4–32)
BACTERIA # UR AUTO: NEGATIVE — SIGNIFICANT CHANGE UP
BASE EXCESS BLDV CALC-SCNC: 1.4 MMOL/L — SIGNIFICANT CHANGE UP (ref -2–3)
BASOPHILS # BLD AUTO: 0.04 K/UL — SIGNIFICANT CHANGE UP (ref 0–0.2)
BASOPHILS NFR BLD AUTO: 0.4 % — SIGNIFICANT CHANGE UP (ref 0–2)
BILIRUB SERPL-MCNC: 0.5 MG/DL — SIGNIFICANT CHANGE UP (ref 0.2–1.2)
BILIRUB UR-MCNC: NEGATIVE — SIGNIFICANT CHANGE UP
BLOOD GAS VENOUS COMPREHENSIVE RESULT: SIGNIFICANT CHANGE UP
BUN SERPL-MCNC: 10 MG/DL — SIGNIFICANT CHANGE UP (ref 7–23)
CALCIUM SERPL-MCNC: 10.2 MG/DL — SIGNIFICANT CHANGE UP (ref 8.4–10.5)
CHLORIDE BLDV-SCNC: 99 MMOL/L — SIGNIFICANT CHANGE UP (ref 96–108)
CHLORIDE SERPL-SCNC: 99 MMOL/L — SIGNIFICANT CHANGE UP (ref 98–107)
CO2 BLDV-SCNC: 29.7 MMOL/L — HIGH (ref 22–26)
CO2 SERPL-SCNC: 24 MMOL/L — SIGNIFICANT CHANGE UP (ref 22–31)
COLOR SPEC: SIGNIFICANT CHANGE UP
CREAT SERPL-MCNC: 0.98 MG/DL — SIGNIFICANT CHANGE UP (ref 0.5–1.3)
DIFF PNL FLD: NEGATIVE — SIGNIFICANT CHANGE UP
EOSINOPHIL # BLD AUTO: 0.37 K/UL — SIGNIFICANT CHANGE UP (ref 0–0.5)
EOSINOPHIL NFR BLD AUTO: 3.5 % — SIGNIFICANT CHANGE UP (ref 0–6)
GAS PNL BLDV: 135 MMOL/L — LOW (ref 136–145)
GLUCOSE BLDV-MCNC: 173 MG/DL — HIGH (ref 70–99)
GLUCOSE SERPL-MCNC: 169 MG/DL — HIGH (ref 70–99)
GLUCOSE UR QL: NEGATIVE — SIGNIFICANT CHANGE UP
HCO3 BLDV-SCNC: 28 MMOL/L — SIGNIFICANT CHANGE UP (ref 22–29)
HCT VFR BLD CALC: 39.3 % — SIGNIFICANT CHANGE UP (ref 34.5–45)
HCT VFR BLDA CALC: 40 % — SIGNIFICANT CHANGE UP (ref 34.5–46.5)
HGB BLD CALC-MCNC: 13.4 G/DL — SIGNIFICANT CHANGE UP (ref 11.5–15.5)
HGB BLD-MCNC: 13.5 G/DL — SIGNIFICANT CHANGE UP (ref 11.5–15.5)
IANC: 7.02 K/UL — SIGNIFICANT CHANGE UP (ref 1.5–8.5)
IMM GRANULOCYTES NFR BLD AUTO: 0.4 % — SIGNIFICANT CHANGE UP (ref 0–1.5)
KETONES UR-MCNC: NEGATIVE — SIGNIFICANT CHANGE UP
LACTATE BLDV-MCNC: 2.2 MMOL/L — HIGH (ref 0.5–2)
LEUKOCYTE ESTERASE UR-ACNC: NEGATIVE — SIGNIFICANT CHANGE UP
LYMPHOCYTES # BLD AUTO: 2.53 K/UL — SIGNIFICANT CHANGE UP (ref 1–3.3)
LYMPHOCYTES # BLD AUTO: 23.9 % — SIGNIFICANT CHANGE UP (ref 13–44)
MCHC RBC-ENTMCNC: 29.5 PG — SIGNIFICANT CHANGE UP (ref 27–34)
MCHC RBC-ENTMCNC: 34.4 GM/DL — SIGNIFICANT CHANGE UP (ref 32–36)
MCV RBC AUTO: 85.8 FL — SIGNIFICANT CHANGE UP (ref 80–100)
MONOCYTES # BLD AUTO: 0.6 K/UL — SIGNIFICANT CHANGE UP (ref 0–0.9)
MONOCYTES NFR BLD AUTO: 5.7 % — SIGNIFICANT CHANGE UP (ref 2–14)
NEUTROPHILS # BLD AUTO: 7.02 K/UL — SIGNIFICANT CHANGE UP (ref 1.8–7.4)
NEUTROPHILS NFR BLD AUTO: 66.1 % — SIGNIFICANT CHANGE UP (ref 43–77)
NITRITE UR-MCNC: NEGATIVE — SIGNIFICANT CHANGE UP
NRBC # BLD: 0 /100 WBCS — SIGNIFICANT CHANGE UP
NRBC # FLD: 0 K/UL — SIGNIFICANT CHANGE UP
PCO2 BLDV: 52 MMHG — HIGH (ref 39–42)
PH BLDV: 7.34 — SIGNIFICANT CHANGE UP (ref 7.32–7.43)
PH UR: 6 — SIGNIFICANT CHANGE UP (ref 5–8)
PLATELET # BLD AUTO: 358 K/UL — SIGNIFICANT CHANGE UP (ref 150–400)
PO2 BLDV: 33 MMHG — SIGNIFICANT CHANGE UP
POTASSIUM BLDV-SCNC: 4.1 MMOL/L — SIGNIFICANT CHANGE UP (ref 3.5–5.1)
POTASSIUM SERPL-MCNC: 4.1 MMOL/L — SIGNIFICANT CHANGE UP (ref 3.5–5.3)
POTASSIUM SERPL-SCNC: 4.1 MMOL/L — SIGNIFICANT CHANGE UP (ref 3.5–5.3)
PROT SERPL-MCNC: 8 G/DL — SIGNIFICANT CHANGE UP (ref 6–8.3)
PROT UR-MCNC: ABNORMAL
RBC # BLD: 4.58 M/UL — SIGNIFICANT CHANGE UP (ref 3.8–5.2)
RBC # FLD: 13 % — SIGNIFICANT CHANGE UP (ref 10.3–14.5)
RBC CASTS # UR COMP ASSIST: 1 /HPF — SIGNIFICANT CHANGE UP (ref 0–4)
SAO2 % BLDV: 50.7 % — SIGNIFICANT CHANGE UP
SODIUM SERPL-SCNC: 136 MMOL/L — SIGNIFICANT CHANGE UP (ref 135–145)
SP GR SPEC: 1.01 — SIGNIFICANT CHANGE UP (ref 1–1.05)
UROBILINOGEN FLD QL: SIGNIFICANT CHANGE UP
WBC # BLD: 10.6 K/UL — HIGH (ref 3.8–10.5)
WBC # FLD AUTO: 10.6 K/UL — HIGH (ref 3.8–10.5)
WBC UR QL: 1 /HPF — SIGNIFICANT CHANGE UP (ref 0–5)

## 2022-02-20 PROCEDURE — 99285 EMERGENCY DEPT VISIT HI MDM: CPT

## 2022-02-20 PROCEDURE — 74177 CT ABD & PELVIS W/CONTRAST: CPT | Mod: 26,MA

## 2022-02-20 RX ORDER — METRONIDAZOLE 500 MG
500 TABLET ORAL ONCE
Refills: 0 | Status: COMPLETED | OUTPATIENT
Start: 2022-02-20 | End: 2022-02-20

## 2022-02-20 RX ORDER — SODIUM CHLORIDE 9 MG/ML
1000 INJECTION INTRAMUSCULAR; INTRAVENOUS; SUBCUTANEOUS ONCE
Refills: 0 | Status: COMPLETED | OUTPATIENT
Start: 2022-02-20 | End: 2022-02-20

## 2022-02-20 RX ORDER — METRONIDAZOLE 500 MG
1 TABLET ORAL
Qty: 21 | Refills: 0
Start: 2022-02-20 | End: 2022-02-26

## 2022-02-20 RX ORDER — MOXIFLOXACIN HYDROCHLORIDE TABLETS, 400 MG 400 MG/1
1 TABLET, FILM COATED ORAL
Qty: 20 | Refills: 0
Start: 2022-02-20 | End: 2022-03-01

## 2022-02-20 RX ORDER — CIPROFLOXACIN LACTATE 400MG/40ML
500 VIAL (ML) INTRAVENOUS ONCE
Refills: 0 | Status: COMPLETED | OUTPATIENT
Start: 2022-02-20 | End: 2022-02-20

## 2022-02-20 RX ORDER — ONDANSETRON 8 MG/1
4 TABLET, FILM COATED ORAL ONCE
Refills: 0 | Status: COMPLETED | OUTPATIENT
Start: 2022-02-20 | End: 2022-02-20

## 2022-02-20 RX ORDER — KETOROLAC TROMETHAMINE 30 MG/ML
15 SYRINGE (ML) INJECTION ONCE
Refills: 0 | Status: DISCONTINUED | OUTPATIENT
Start: 2022-02-20 | End: 2022-02-20

## 2022-02-20 RX ADMIN — ONDANSETRON 4 MILLIGRAM(S): 8 TABLET, FILM COATED ORAL at 18:22

## 2022-02-20 RX ADMIN — Medication 15 MILLIGRAM(S): at 22:03

## 2022-02-20 RX ADMIN — SODIUM CHLORIDE 1000 MILLILITER(S): 9 INJECTION INTRAMUSCULAR; INTRAVENOUS; SUBCUTANEOUS at 18:23

## 2022-02-20 NOTE — ED PROVIDER NOTE - OBJECTIVE STATEMENT
65 y/o female pmh htn, hld, dm c/o abd pain and diarrhea x 1 weeks. Pt admits to 2-3 episodes of loose stool daily this week with generalized abd pain. Pt admits to 4 episodes of diarrhea today. Pt denies aggravating or relieving factors. Admits to nausea. Denies chest pain, sob, vomiting, dysuria, vaginal bleeding, numbness, tingling, weakness, dizziness, syncope, fever or chills.

## 2022-02-20 NOTE — ED PROVIDER NOTE - PATIENT PORTAL LINK FT
You can access the FollowMyHealth Patient Portal offered by [facility] by registering at the following website: [email or web address]. By joining [employer]’s FollowMyHealth portal, you will also be able to view your health information using other applications (apps) compatible with our system.

## 2022-02-20 NOTE — ED ADULT TRIAGE NOTE - CHIEF COMPLAINT QUOTE
son in law states " she has abdominal pain with nausea and diarrhea since last week " h/o HTN, DM, high cholesterol.

## 2022-02-20 NOTE — ED PROVIDER NOTE - CLINICAL SUMMARY MEDICAL DECISION MAKING FREE TEXT BOX
65 y/o female pmh htn, hld, dm c/o abd pain ,nausea and diarrhea- no recent travel, no recent abx or hospital stays- will check labs, CT, fluids, reassess

## 2022-02-21 VITALS
DIASTOLIC BLOOD PRESSURE: 64 MMHG | SYSTOLIC BLOOD PRESSURE: 167 MMHG | OXYGEN SATURATION: 97 % | HEART RATE: 88 BPM | TEMPERATURE: 99 F | RESPIRATION RATE: 18 BRPM

## 2022-02-21 RX ORDER — ACETAMINOPHEN 500 MG
650 TABLET ORAL ONCE
Refills: 0 | Status: COMPLETED | OUTPATIENT
Start: 2022-02-21 | End: 2022-02-21

## 2022-02-21 RX ADMIN — Medication 650 MILLIGRAM(S): at 00:16

## 2022-02-21 RX ADMIN — Medication 500 MILLIGRAM(S): at 00:12

## 2022-02-21 RX ADMIN — Medication 500 MILLIGRAM(S): at 00:07

## 2022-07-12 ENCOUNTER — INPATIENT (INPATIENT)
Facility: HOSPITAL | Age: 67
LOS: 16 days | Discharge: HOME | End: 2022-07-29
Attending: HOSPITALIST | Admitting: HOSPITALIST

## 2022-07-12 VITALS
SYSTOLIC BLOOD PRESSURE: 158 MMHG | WEIGHT: 160.06 LBS | HEART RATE: 94 BPM | OXYGEN SATURATION: 98 % | HEIGHT: 63 IN | RESPIRATION RATE: 17 BRPM | TEMPERATURE: 97 F | DIASTOLIC BLOOD PRESSURE: 84 MMHG

## 2022-07-12 DIAGNOSIS — M47.26 OTHER SPONDYLOSIS WITH RADICULOPATHY, LUMBAR REGION: ICD-10-CM
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
## 2022-07-12 DIAGNOSIS — G83.4 CAUDA EQUINA SYNDROME: ICD-10-CM

## 2022-07-12 DIAGNOSIS — E11.00 TYPE 2 DIABETES MELLITUS WITH HYPEROSMOLARITY WITHOUT NONKETOTIC HYPERGLYCEMIC-HYPEROSMOLAR COMA (NKHHC): ICD-10-CM

## 2022-07-12 DIAGNOSIS — Z90.49 ACQUIRED ABSENCE OF OTHER SPECIFIED PARTS OF DIGESTIVE TRACT: Chronic | ICD-10-CM

## 2022-07-12 DIAGNOSIS — Z79.84 LONG TERM (CURRENT) USE OF ORAL HYPOGLYCEMIC DRUGS: ICD-10-CM

## 2022-07-12 DIAGNOSIS — M54.16 RADICULOPATHY, LUMBAR REGION: ICD-10-CM

## 2022-07-12 DIAGNOSIS — R26.0 ATAXIC GAIT: ICD-10-CM

## 2022-07-12 DIAGNOSIS — E11.10 TYPE 2 DIABETES MELLITUS WITH KETOACIDOSIS WITHOUT COMA: ICD-10-CM

## 2022-07-12 DIAGNOSIS — N18.2 CHRONIC KIDNEY DISEASE, STAGE 2 (MILD): ICD-10-CM

## 2022-07-12 DIAGNOSIS — M51.16 INTERVERTEBRAL DISC DISORDERS WITH RADICULOPATHY, LUMBAR REGION: ICD-10-CM

## 2022-07-12 DIAGNOSIS — R33.9 RETENTION OF URINE, UNSPECIFIED: ICD-10-CM

## 2022-07-12 DIAGNOSIS — G92.8 OTHER TOXIC ENCEPHALOPATHY: ICD-10-CM

## 2022-07-12 DIAGNOSIS — E11.22 TYPE 2 DIABETES MELLITUS WITH DIABETIC CHRONIC KIDNEY DISEASE: ICD-10-CM

## 2022-07-12 DIAGNOSIS — M19.90 UNSPECIFIED OSTEOARTHRITIS, UNSPECIFIED SITE: ICD-10-CM

## 2022-07-12 DIAGNOSIS — E78.5 HYPERLIPIDEMIA, UNSPECIFIED: ICD-10-CM

## 2022-07-12 DIAGNOSIS — M43.16 SPONDYLOLISTHESIS, LUMBAR REGION: ICD-10-CM

## 2022-07-12 DIAGNOSIS — N17.9 ACUTE KIDNEY FAILURE, UNSPECIFIED: ICD-10-CM

## 2022-07-12 DIAGNOSIS — I12.9 HYPERTENSIVE CHRONIC KIDNEY DISEASE WITH STAGE 1 THROUGH STAGE 4 CHRONIC KIDNEY DISEASE, OR UNSPECIFIED CHRONIC KIDNEY DISEASE: ICD-10-CM

## 2022-07-12 DIAGNOSIS — Z20.822 CONTACT WITH AND (SUSPECTED) EXPOSURE TO COVID-19: ICD-10-CM

## 2022-07-12 LAB
BASOPHILS # BLD AUTO: 0.04 K/UL — SIGNIFICANT CHANGE UP (ref 0–0.2)
BASOPHILS NFR BLD AUTO: 0.4 % — SIGNIFICANT CHANGE UP (ref 0–2)
EOSINOPHIL # BLD AUTO: 0.44 K/UL — SIGNIFICANT CHANGE UP (ref 0–0.5)
EOSINOPHIL NFR BLD AUTO: 4.4 % — SIGNIFICANT CHANGE UP (ref 0–6)
HCT VFR BLD CALC: 37.4 % — SIGNIFICANT CHANGE UP (ref 34.5–45)
HGB BLD-MCNC: 12.8 G/DL — SIGNIFICANT CHANGE UP (ref 11.5–15.5)
IMM GRANULOCYTES NFR BLD AUTO: 0.5 % — SIGNIFICANT CHANGE UP (ref 0–1.5)
LYMPHOCYTES # BLD AUTO: 2.75 K/UL — SIGNIFICANT CHANGE UP (ref 1–3.3)
LYMPHOCYTES # BLD AUTO: 27.3 % — SIGNIFICANT CHANGE UP (ref 13–44)
MCHC RBC-ENTMCNC: 29.7 PG — SIGNIFICANT CHANGE UP (ref 27–34)
MCHC RBC-ENTMCNC: 34.2 G/DL — SIGNIFICANT CHANGE UP (ref 32–36)
MCV RBC AUTO: 86.8 FL — SIGNIFICANT CHANGE UP (ref 80–100)
MONOCYTES # BLD AUTO: 0.5 K/UL — SIGNIFICANT CHANGE UP (ref 0–0.9)
MONOCYTES NFR BLD AUTO: 5 % — SIGNIFICANT CHANGE UP (ref 2–14)
NEUTROPHILS # BLD AUTO: 6.29 K/UL — SIGNIFICANT CHANGE UP (ref 1.8–7.4)
NEUTROPHILS NFR BLD AUTO: 62.4 % — SIGNIFICANT CHANGE UP (ref 43–77)
NRBC # BLD: 0 /100 WBCS — SIGNIFICANT CHANGE UP (ref 0–0)
PLATELET # BLD AUTO: 289 K/UL — SIGNIFICANT CHANGE UP (ref 150–400)
RBC # BLD: 4.31 M/UL — SIGNIFICANT CHANGE UP (ref 3.8–5.2)
RBC # FLD: 13.2 % — SIGNIFICANT CHANGE UP (ref 10.3–14.5)
WBC # BLD: 10.07 K/UL — SIGNIFICANT CHANGE UP (ref 3.8–10.5)
WBC # FLD AUTO: 10.07 K/UL — SIGNIFICANT CHANGE UP (ref 3.8–10.5)

## 2022-07-12 PROCEDURE — 93010 ELECTROCARDIOGRAM REPORT: CPT

## 2022-07-12 PROCEDURE — 99285 EMERGENCY DEPT VISIT HI MDM: CPT

## 2022-07-12 PROCEDURE — 73502 X-RAY EXAM HIP UNI 2-3 VIEWS: CPT | Mod: 26,LT

## 2022-07-12 RX ORDER — FERROUS SULFATE 325(65) MG
1 TABLET ORAL
Qty: 0 | Refills: 0 | DISCHARGE

## 2022-07-12 RX ORDER — FOLIC ACID 0.8 MG
1 TABLET ORAL
Qty: 0 | Refills: 0 | DISCHARGE

## 2022-07-12 RX ORDER — SODIUM CHLORIDE 9 MG/ML
1000 INJECTION INTRAMUSCULAR; INTRAVENOUS; SUBCUTANEOUS ONCE
Refills: 0 | Status: COMPLETED | OUTPATIENT
Start: 2022-07-12 | End: 2022-07-12

## 2022-07-12 RX ORDER — ACETAMINOPHEN 500 MG
650 TABLET ORAL ONCE
Refills: 0 | Status: COMPLETED | OUTPATIENT
Start: 2022-07-12 | End: 2022-07-12

## 2022-07-12 RX ORDER — MORPHINE SULFATE 50 MG/1
2 CAPSULE, EXTENDED RELEASE ORAL ONCE
Refills: 0 | Status: DISCONTINUED | OUTPATIENT
Start: 2022-07-12 | End: 2022-07-12

## 2022-07-12 RX ORDER — DEXAMETHASONE 0.5 MG/5ML
6 ELIXIR ORAL ONCE
Refills: 0 | Status: COMPLETED | OUTPATIENT
Start: 2022-07-12 | End: 2022-07-12

## 2022-07-12 RX ORDER — CYCLOBENZAPRINE HYDROCHLORIDE 10 MG/1
5 TABLET, FILM COATED ORAL ONCE
Refills: 0 | Status: COMPLETED | OUTPATIENT
Start: 2022-07-12 | End: 2022-07-12

## 2022-07-12 RX ADMIN — SODIUM CHLORIDE 1000 MILLILITER(S): 9 INJECTION INTRAMUSCULAR; INTRAVENOUS; SUBCUTANEOUS at 23:24

## 2022-07-12 RX ADMIN — Medication 650 MILLIGRAM(S): at 23:06

## 2022-07-12 RX ADMIN — Medication 6 MILLIGRAM(S): at 23:24

## 2022-07-12 RX ADMIN — MORPHINE SULFATE 2 MILLIGRAM(S): 50 CAPSULE, EXTENDED RELEASE ORAL at 23:54

## 2022-07-12 RX ADMIN — Medication 650 MILLIGRAM(S): at 23:36

## 2022-07-12 RX ADMIN — MORPHINE SULFATE 2 MILLIGRAM(S): 50 CAPSULE, EXTENDED RELEASE ORAL at 23:24

## 2022-07-12 RX ADMIN — CYCLOBENZAPRINE HYDROCHLORIDE 5 MILLIGRAM(S): 10 TABLET, FILM COATED ORAL at 23:24

## 2022-07-12 NOTE — ED ADULT NURSE NOTE - OBJECTIVE STATEMENT
patient alert and oriented x3. pt sylheti speaking.  used. pt c/o lower back and left leg pain. pt also reports numbness in tingling. pt also reports headache. ABD distended on exam. denies chest pain, SOB.

## 2022-07-12 NOTE — ED ADULT NURSE NOTE - PRIMARY CARE PROVIDER
June 24, 2020    Re: Brian Ortiz  [address]  [facility] [address]    To Whom It May Concern:    This is to certify that Brian Ortiz was scheduled for an in-person physical in our clinic on 4/6/2020. Unfortunately, due to the COVID-19 pandemic, the appointment had to be cancelled.    He has been rescheduled for a clinic appointment on 7/14/2020.    Please let me know if you have any questions or concerns.      Kenyon Leonard MD     [facility]  2nd Floor  [address] [place] [address]  Tel [phone]  Fax [phone]  
unknown

## 2022-07-12 NOTE — ED ADULT TRIAGE NOTE - DOMESTIC TRAVEL HIGH RISK QUESTION
For information on Fall & Injury Prevention, visit: [email or web address] OR  [email or web address] OR  https://www.cdc.gov/steadi/patient.html No

## 2022-07-12 NOTE — ED PROVIDER NOTE - PHYSICAL EXAMINATION
VITAL SIGNS: I have reviewed nursing notes and confirm.   GEN: Well-developed; well-nourished; in mild acute painful distress. Speaking full sentences.  SKIN: Warm, pink, no rash, no diaphoresis, no cyanosis, well perfused.   HEAD: Normocephalic; atraumatic. No scalp lacerations, no abrasions.  NECK: Supple; non tender.   EYES: Pupils 3mm equal, round, reactive to light and accomodation, conjunctiva and sclera clear. Extra-ocular movements intact bilaterally.  ENT: No nasal discharge; airway clear. Trachea is midline. ORAL: No oropharyngeal exudates or erythema. Normal dentition.  CV: Regular rate and rhythm. S1, S2 normal; no murmurs, gallops, or rubs. No lower extremity pitting edema bilaterally. Capillary refill < 2 seconds throughout. Distal pulses intact 2+ throughout.  RESP: CTA bilaterally. No wheezes, rales, or rhonchi.   ABD: Normal bowel sounds, soft, non-distended, non-tender, no rebound, no guarding, no rigidity, no hepatosplenomegaly. No CVA tenderness bilaterally.  MSK: Normal range of motion and movement of all 4 extremities. No joint or muscular pain throughout. No clubbing. (+) LEFT Buttocks ttp, mild-moderate. straight leg test (+) on left leg.   BACK: No thoracolumbar midline or paravertebral tenderness. No step-offs or obvious deformities.  NEURO: Alert & oriented x 3, Grossly unremarkable. Sensory and motor intact throughout. No focal deficits.  Normal speech and coordination.   PSYCH: Cooperative, appropriate.

## 2022-07-12 NOTE — ED ADULT NURSE NOTE - ED STAT RN HANDOFF DETAILS
Assuming patient's care for coverage. Report received from Magalys KERR and patient informed during rounding. Assessment available on [facility]. Will continue to monitor. pt just returned from CT scan. awaiting CT results. pt aware of plan of care

## 2022-07-12 NOTE — ED ADULT NURSE NOTE - PAIN RATING/NUMBER SCALE (0-10): REST
"Chief Complaint   Patient presents with     Otalgia     left ear pain        Initial Pulse 112  Temp 100.6  F (38.1  C) (Tympanic)  Resp 14  Ht 3' 4\" (1.016 m)  Wt 41 lb (18.6 kg)  SpO2 97%  BMI 18.02 kg/m2 Estimated body mass index is 18.02 kg/(m^2) as calculated from the following:    Height as of this encounter: 3' 4\" (1.016 m).    Weight as of this encounter: 41 lb (18.6 kg).  Medication Reconciliation: complete   Ramona Bloedow LPN    "
10

## 2022-07-12 NOTE — ED PROVIDER NOTE - CLINICAL SUMMARY MEDICAL DECISION MAKING FREE TEXT BOX
Pt p/w atraumatic low back pain with (-) fevers, chills, (-) saddle anesthesia or perianal numbness,(-) rash, (-) IVDU hx, (-) urinary or bowel incontinence/retention, or focal neurological deficits.     DDx: Likely musculoskeletal back pain vs disc herniation/radiculopathy. Considered DDX: caudia equina syndrome, vertebral compression fracture, epidural abscess, discitis, vertebral osteomyelitis, cord compression, or bone malignancy; but these are unlikely due to the HPI and exam.  - CT AP/CT lumbar, labs, EKG  - Pain control: steroids, lidocaine patch, NSAIDs, opioid PRN  - Failed ambulation, son unable to take care of patient at home, admit for intractable pain.

## 2022-07-12 NOTE — ED PROVIDER NOTE - OBJECTIVE STATEMENT
66F PMHx of dm htn hld presenting with left hip pain x 1-2 days. Described as mild, achy, shooting pain down left buttocks to knee. Exacerbated w/ standing and position. Otherwise, the patient denies any neck pain, headaches, chest pain, shortness of breath, n/v/d, abdominal pain, recent illness, fevers, chills, recent spinal/back surgeries or procedures, bowel or bladder incontinence, bowel or bladder retention, constipation, IV drug use, known cancer history, recent weight loss, trauma, weakness, other subjective neurological deficits, numbness/tingling, dysuria, hematuria, rashes.

## 2022-07-13 ENCOUNTER — TRANSCRIPTION ENCOUNTER (OUTPATIENT)
Age: 67
End: 2022-07-13

## 2022-07-13 DIAGNOSIS — I10 ESSENTIAL (PRIMARY) HYPERTENSION: ICD-10-CM

## 2022-07-13 DIAGNOSIS — E11.00 TYPE 2 DIABETES MELLITUS WITH HYPEROSMOLARITY WITHOUT NONKETOTIC HYPERGLYCEMIC-HYPEROSMOLAR COMA (NKHHC): ICD-10-CM

## 2022-07-13 DIAGNOSIS — E78.5 HYPERLIPIDEMIA, UNSPECIFIED: ICD-10-CM

## 2022-07-13 DIAGNOSIS — E11.10 TYPE 2 DIABETES MELLITUS WITH KETOACIDOSIS WITHOUT COMA: ICD-10-CM

## 2022-07-13 DIAGNOSIS — M54.16 RADICULOPATHY, LUMBAR REGION: ICD-10-CM

## 2022-07-13 DIAGNOSIS — N18.30 CHRONIC KIDNEY DISEASE, STAGE 3 UNSPECIFIED: ICD-10-CM

## 2022-07-13 LAB
ALBUMIN SERPL ELPH-MCNC: 3.4 G/DL — SIGNIFICANT CHANGE UP (ref 3.3–5)
ALP SERPL-CCNC: 33 U/L — LOW (ref 40–120)
ALT FLD-CCNC: 27 U/L — SIGNIFICANT CHANGE UP (ref 12–78)
ANION GAP SERPL CALC-SCNC: 7 MMOL/L — SIGNIFICANT CHANGE UP (ref 5–17)
APPEARANCE UR: CLEAR — SIGNIFICANT CHANGE UP
APTT BLD: 34.4 SEC — SIGNIFICANT CHANGE UP (ref 27.5–35.5)
AST SERPL-CCNC: 23 U/L — SIGNIFICANT CHANGE UP (ref 15–37)
BACTERIA # UR AUTO: NEGATIVE — SIGNIFICANT CHANGE UP
BILIRUB SERPL-MCNC: 0.4 MG/DL — SIGNIFICANT CHANGE UP (ref 0.2–1.2)
BILIRUB UR-MCNC: NEGATIVE — SIGNIFICANT CHANGE UP
BUN SERPL-MCNC: 15 MG/DL — SIGNIFICANT CHANGE UP (ref 7–23)
CALCIUM SERPL-MCNC: 9.2 MG/DL — SIGNIFICANT CHANGE UP (ref 8.5–10.1)
CHLORIDE SERPL-SCNC: 107 MMOL/L — SIGNIFICANT CHANGE UP (ref 96–108)
CO2 SERPL-SCNC: 24 MMOL/L — SIGNIFICANT CHANGE UP (ref 22–31)
COLOR SPEC: YELLOW — SIGNIFICANT CHANGE UP
CREAT SERPL-MCNC: 1.47 MG/DL — HIGH (ref 0.5–1.3)
DIFF PNL FLD: NEGATIVE — SIGNIFICANT CHANGE UP
EGFR: 39 ML/MIN/1.73M2 — LOW
EPI CELLS # UR: SIGNIFICANT CHANGE UP
FLUAV AG NPH QL: SIGNIFICANT CHANGE UP
FLUBV AG NPH QL: SIGNIFICANT CHANGE UP
GLUCOSE BLDC GLUCOMTR-MCNC: 362 MG/DL — HIGH (ref 70–99)
GLUCOSE BLDC GLUCOMTR-MCNC: 392 MG/DL — HIGH (ref 70–99)
GLUCOSE BLDC GLUCOMTR-MCNC: 396 MG/DL — HIGH (ref 70–99)
GLUCOSE BLDC GLUCOMTR-MCNC: 440 MG/DL — HIGH (ref 70–99)
GLUCOSE BLDC GLUCOMTR-MCNC: 457 MG/DL — CRITICAL HIGH (ref 70–99)
GLUCOSE SERPL-MCNC: 270 MG/DL — HIGH (ref 70–99)
GLUCOSE UR QL: 100 MG/DL
INR BLD: 0.94 RATIO — SIGNIFICANT CHANGE UP (ref 0.88–1.16)
KETONES UR-MCNC: NEGATIVE — SIGNIFICANT CHANGE UP
LEUKOCYTE ESTERASE UR-ACNC: NEGATIVE — SIGNIFICANT CHANGE UP
LIDOCAIN IGE QN: 258 U/L — SIGNIFICANT CHANGE UP (ref 73–393)
NITRITE UR-MCNC: NEGATIVE — SIGNIFICANT CHANGE UP
NT-PROBNP SERPL-SCNC: 104 PG/ML — SIGNIFICANT CHANGE UP (ref 0–125)
PH UR: 6.5 — SIGNIFICANT CHANGE UP (ref 5–8)
POTASSIUM SERPL-MCNC: 4.5 MMOL/L — SIGNIFICANT CHANGE UP (ref 3.5–5.3)
POTASSIUM SERPL-SCNC: 4.5 MMOL/L — SIGNIFICANT CHANGE UP (ref 3.5–5.3)
PROT SERPL-MCNC: 7.6 GM/DL — SIGNIFICANT CHANGE UP (ref 6–8.3)
PROT UR-MCNC: 30 MG/DL
PROTHROM AB SERPL-ACNC: 11.2 SEC — SIGNIFICANT CHANGE UP (ref 10.5–13.4)
RBC CASTS # UR COMP ASSIST: SIGNIFICANT CHANGE UP /HPF (ref 0–4)
SARS-COV-2 RNA SPEC QL NAA+PROBE: SIGNIFICANT CHANGE UP
SODIUM SERPL-SCNC: 138 MMOL/L — SIGNIFICANT CHANGE UP (ref 135–145)
SP GR SPEC: 1.01 — SIGNIFICANT CHANGE UP (ref 1.01–1.02)
TROPONIN I, HIGH SENSITIVITY RESULT: 10.1 NG/L — SIGNIFICANT CHANGE UP
UROBILINOGEN FLD QL: NEGATIVE MG/DL — SIGNIFICANT CHANGE UP
WBC UR QL: SIGNIFICANT CHANGE UP

## 2022-07-13 PROCEDURE — 74177 CT ABD & PELVIS W/CONTRAST: CPT | Mod: 26,MA

## 2022-07-13 PROCEDURE — 72131 CT LUMBAR SPINE W/O DYE: CPT | Mod: 26,MA

## 2022-07-13 PROCEDURE — 12345: CPT | Mod: NC

## 2022-07-13 PROCEDURE — 93971 EXTREMITY STUDY: CPT | Mod: 26

## 2022-07-13 PROCEDURE — 99223 1ST HOSP IP/OBS HIGH 75: CPT

## 2022-07-13 RX ORDER — ONDANSETRON 8 MG/1
4 TABLET, FILM COATED ORAL EVERY 8 HOURS
Refills: 0 | Status: DISCONTINUED | OUTPATIENT
Start: 2022-07-13 | End: 2022-07-15

## 2022-07-13 RX ORDER — POLYETHYLENE GLYCOL 3350 17 G/17G
17 POWDER, FOR SOLUTION ORAL DAILY
Refills: 0 | Status: DISCONTINUED | OUTPATIENT
Start: 2022-07-13 | End: 2022-07-29

## 2022-07-13 RX ORDER — OXYCODONE HYDROCHLORIDE 5 MG/1
5 TABLET ORAL EVERY 4 HOURS
Refills: 0 | Status: DISCONTINUED | OUTPATIENT
Start: 2022-07-13 | End: 2022-07-14

## 2022-07-13 RX ORDER — MONTELUKAST 4 MG/1
10 TABLET, CHEWABLE ORAL DAILY
Refills: 0 | Status: DISCONTINUED | OUTPATIENT
Start: 2022-07-13 | End: 2022-07-29

## 2022-07-13 RX ORDER — SODIUM CHLORIDE 9 MG/ML
1000 INJECTION INTRAMUSCULAR; INTRAVENOUS; SUBCUTANEOUS
Refills: 0 | Status: DISCONTINUED | OUTPATIENT
Start: 2022-07-13 | End: 2022-07-15

## 2022-07-13 RX ORDER — GLUCAGON INJECTION, SOLUTION 0.5 MG/.1ML
1 INJECTION, SOLUTION SUBCUTANEOUS ONCE
Refills: 0 | Status: DISCONTINUED | OUTPATIENT
Start: 2022-07-13 | End: 2022-07-29

## 2022-07-13 RX ORDER — FERROUS SULFATE 325(65) MG
325 TABLET ORAL
Refills: 0 | Status: DISCONTINUED | OUTPATIENT
Start: 2022-07-13 | End: 2022-07-29

## 2022-07-13 RX ORDER — INSULIN LISPRO 100/ML
VIAL (ML) SUBCUTANEOUS AT BEDTIME
Refills: 0 | Status: DISCONTINUED | OUTPATIENT
Start: 2022-07-13 | End: 2022-07-15

## 2022-07-13 RX ORDER — OXYCODONE HYDROCHLORIDE 5 MG/1
10 TABLET ORAL EVERY 4 HOURS
Refills: 0 | Status: DISCONTINUED | OUTPATIENT
Start: 2022-07-13 | End: 2022-07-14

## 2022-07-13 RX ORDER — SODIUM CHLORIDE 9 MG/ML
1000 INJECTION, SOLUTION INTRAVENOUS
Refills: 0 | Status: DISCONTINUED | OUTPATIENT
Start: 2022-07-13 | End: 2022-07-29

## 2022-07-13 RX ORDER — PANTOPRAZOLE SODIUM 20 MG/1
40 TABLET, DELAYED RELEASE ORAL
Refills: 0 | Status: DISCONTINUED | OUTPATIENT
Start: 2022-07-13 | End: 2022-07-15

## 2022-07-13 RX ORDER — ACETAMINOPHEN 500 MG
1000 TABLET ORAL EVERY 8 HOURS
Refills: 0 | Status: DISCONTINUED | OUTPATIENT
Start: 2022-07-13 | End: 2022-07-14

## 2022-07-13 RX ORDER — KETOROLAC TROMETHAMINE 30 MG/ML
15 SYRINGE (ML) INJECTION ONCE
Refills: 0 | Status: DISCONTINUED | OUTPATIENT
Start: 2022-07-13 | End: 2022-07-13

## 2022-07-13 RX ORDER — NALOXONE HYDROCHLORIDE 4 MG/.1ML
0.4 SPRAY NASAL ONCE
Refills: 0 | Status: DISCONTINUED | OUTPATIENT
Start: 2022-07-13 | End: 2022-07-29

## 2022-07-13 RX ORDER — DEXTROSE 50 % IN WATER 50 %
25 SYRINGE (ML) INTRAVENOUS ONCE
Refills: 0 | Status: DISCONTINUED | OUTPATIENT
Start: 2022-07-13 | End: 2022-07-29

## 2022-07-13 RX ORDER — SENNA PLUS 8.6 MG/1
2 TABLET ORAL AT BEDTIME
Refills: 0 | Status: DISCONTINUED | OUTPATIENT
Start: 2022-07-13 | End: 2022-07-15

## 2022-07-13 RX ORDER — DEXTROSE 50 % IN WATER 50 %
15 SYRINGE (ML) INTRAVENOUS ONCE
Refills: 0 | Status: DISCONTINUED | OUTPATIENT
Start: 2022-07-13 | End: 2022-07-29

## 2022-07-13 RX ORDER — METOPROLOL TARTRATE 50 MG
25 TABLET ORAL DAILY
Refills: 0 | Status: DISCONTINUED | OUTPATIENT
Start: 2022-07-13 | End: 2022-07-24

## 2022-07-13 RX ORDER — LANOLIN ALCOHOL/MO/W.PET/CERES
3 CREAM (GRAM) TOPICAL AT BEDTIME
Refills: 0 | Status: DISCONTINUED | OUTPATIENT
Start: 2022-07-13 | End: 2022-07-29

## 2022-07-13 RX ORDER — CYCLOBENZAPRINE HYDROCHLORIDE 10 MG/1
5 TABLET, FILM COATED ORAL ONCE
Refills: 0 | Status: COMPLETED | OUTPATIENT
Start: 2022-07-13 | End: 2022-07-13

## 2022-07-13 RX ORDER — ATORVASTATIN CALCIUM 80 MG/1
10 TABLET, FILM COATED ORAL DAILY
Refills: 0 | Status: DISCONTINUED | OUTPATIENT
Start: 2022-07-13 | End: 2022-07-29

## 2022-07-13 RX ORDER — INSULIN LISPRO 100/ML
VIAL (ML) SUBCUTANEOUS
Refills: 0 | Status: DISCONTINUED | OUTPATIENT
Start: 2022-07-13 | End: 2022-07-15

## 2022-07-13 RX ORDER — MORPHINE SULFATE 50 MG/1
4 CAPSULE, EXTENDED RELEASE ORAL ONCE
Refills: 0 | Status: DISCONTINUED | OUTPATIENT
Start: 2022-07-13 | End: 2022-07-13

## 2022-07-13 RX ADMIN — Medication 25 MILLIGRAM(S): at 06:02

## 2022-07-13 RX ADMIN — Medication 10: at 16:21

## 2022-07-13 RX ADMIN — SODIUM CHLORIDE 50 MILLILITER(S): 9 INJECTION INTRAMUSCULAR; INTRAVENOUS; SUBCUTANEOUS at 17:22

## 2022-07-13 RX ADMIN — Medication 1000 MILLIGRAM(S): at 14:55

## 2022-07-13 RX ADMIN — OXYCODONE HYDROCHLORIDE 10 MILLIGRAM(S): 5 TABLET ORAL at 22:44

## 2022-07-13 RX ADMIN — OXYCODONE HYDROCHLORIDE 10 MILLIGRAM(S): 5 TABLET ORAL at 21:44

## 2022-07-13 RX ADMIN — Medication 1000 MILLIGRAM(S): at 14:04

## 2022-07-13 RX ADMIN — ATORVASTATIN CALCIUM 10 MILLIGRAM(S): 80 TABLET, FILM COATED ORAL at 13:16

## 2022-07-13 RX ADMIN — Medication 10: at 08:33

## 2022-07-13 RX ADMIN — Medication 8: at 21:35

## 2022-07-13 RX ADMIN — MORPHINE SULFATE 4 MILLIGRAM(S): 50 CAPSULE, EXTENDED RELEASE ORAL at 01:57

## 2022-07-13 RX ADMIN — Medication 1000 MILLIGRAM(S): at 06:43

## 2022-07-13 RX ADMIN — Medication 325 MILLIGRAM(S): at 06:02

## 2022-07-13 RX ADMIN — POLYETHYLENE GLYCOL 3350 17 GRAM(S): 17 POWDER, FOR SOLUTION ORAL at 13:16

## 2022-07-13 RX ADMIN — Medication 325 MILLIGRAM(S): at 18:04

## 2022-07-13 RX ADMIN — PANTOPRAZOLE SODIUM 40 MILLIGRAM(S): 20 TABLET, DELAYED RELEASE ORAL at 08:37

## 2022-07-13 RX ADMIN — Medication 10: at 12:43

## 2022-07-13 RX ADMIN — Medication 1000 MILLIGRAM(S): at 06:02

## 2022-07-13 RX ADMIN — SENNA PLUS 2 TABLET(S): 8.6 TABLET ORAL at 21:46

## 2022-07-13 RX ADMIN — MONTELUKAST 10 MILLIGRAM(S): 4 TABLET, CHEWABLE ORAL at 13:16

## 2022-07-13 RX ADMIN — CYCLOBENZAPRINE HYDROCHLORIDE 5 MILLIGRAM(S): 10 TABLET, FILM COATED ORAL at 02:47

## 2022-07-13 RX ADMIN — OXYCODONE HYDROCHLORIDE 5 MILLIGRAM(S): 5 TABLET ORAL at 09:31

## 2022-07-13 NOTE — ED ADULT NURSE REASSESSMENT NOTE - NS ED NURSE REASSESS COMMENT FT1
patient sleeping at this time , easy to arouse no pain when awake continue to monitor quintanilla emtied  60-0

## 2022-07-13 NOTE — H&P ADULT - NSHPPHYSICALEXAM_GEN_ALL_CORE
PHYSICAL EXAM:  GENERAL: NAD, lying in bed comfortably  HEAD:  Atraumatic, Normocephalic  EYES: EOMI, PERRLA, conjunctiva and sclera clear  ENT: Moist mucous membranes  NECK: Supple, No JVD  CHEST/LUNG: Clear to auscultation bilaterally; No rales, rhonchi, wheezing, or rubs. Unlabored respirations  HEART: Regular rate and rhythm; No murmurs, rubs, or gallops  ABDOMEN: Bowel sounds present; Soft, Nontender, Nondistended. No hepatomegaly  EXTREMITIES:  2+ Peripheral Pulses, brisk capillary refill. No clubbing, cyanosis, or edema  NERVOUS SYSTEM:  Alert & Oriented X3, speech clear. No deficits   MSK: FROM all 4 extremities, full and equal strength. SLR left is positive at 60 degree.  SKIN: No rashes or lesions

## 2022-07-13 NOTE — H&P ADULT - NSHPLABSRESULTS_GEN_ALL_CORE
LABS:                        12.8   10.07 )-----------( 289      ( 12 Jul 2022 23:14 )             37.4     07-12    138  |  107  |  15  ----------------------------<  270<H>  4.5   |  24  |  1.47<H>    Ca    9.2      12 Jul 2022 23:14    TPro  7.6  /  Alb  3.4  /  TBili  0.4  /  DBili  x   /  AST  23  /  ALT  27  /  AlkPhos  33<L>  07-12    PT/INR - ( 13 Jul 2022 00:06 )   PT: 11.2 sec;   INR: 0.94 ratio         PTT Ct Abd/Pelvis and CT Lumbar Spine :Mild motion artifact in the abdomen. No evidence of acute abdominal or pelvic abnormality.No evidence of acute lumbar spine abnormality. Mild degenerative changes of the lower lumbar spine. US negative for DVT- ( 13 Jul 2022 00:06 )  PTT:34.4 sec

## 2022-07-13 NOTE — PATIENT PROFILE ADULT - LIVING ENVIRONMENT
[facility]  [address]  [place] [address]               Sabiha Gamez   3/26/2017  8:32 PM   ED    Description:  Female : 2003   Department:  [facility]           Your Care was Coordinated By:     Provider Role From To    Rafiq Reese MD Attending Provider 17 --      Reason for Visit     Foot Pain     Rash           Diagnoses this Visit        Comments    Closed displaced fracture of fifth metatarsal bone of right foot, initial encounter    -  Primary     Pain in joint involving ankle and foot, unspecified laterality         Ankle pain         Dermatitis           ED Disposition     None           To Do List           Follow-up Information     Follow up with Adolph Finley MD In 1 week.    Specialty:  Orthopedic Surgery    Contact information:    [address]  [address]  [place] [address]  [phone]         These Medications        Disp Refills Start End    ibuprofen (ADVIL,MOTRIN) 800 MG tablet 20 tablet 0 3/26/2017     Take 1 tablet (800 mg total) by mouth every 6 (six) hours as needed for Pain. - Oral    triamcinolone (KENALOG) 0.5 % ointment 15 g 015 3/26/2017 2017    Apply topically 2 (two) times daily. - Topical (Top)      [facility]     [facility] Care [facility] -  Assistance  Registered nurses in the [facility] provide clinical advisement, health education, appointment booking, and other advisory services.  Call for this free service at [phone].             Medications           Message regarding Medications     Verify the changes and/or additions to your medication regime listed below are the same as discussed with your clinician today.  If any of these changes or additions are incorrect, please notify your healthcare provider.        START taking these NEW medications        Refills    ibuprofen (ADVIL,MOTRIN) 800 MG tablet 0    Sig: Take 1 tablet (800 mg total) by mouth  every 6 (six) hours as needed for Pain.    Class: Print    Route: Oral    triamcinolone (KENALOG) 0.5 % ointment 015    Sig: Apply topically 2 (two) times daily.    Class: Print    Route: Topical (Top)           Verify that the below list of medications is an accurate representation of the medications you are currently taking.  If none reported, the list may be blank. If incorrect, please contact your healthcare provider. Carry this list with you in case of emergency.           Current Medications     ibuprofen (ADVIL,MOTRIN) 800 MG tablet Take 1 tablet (800 mg total) by mouth every 6 (six) hours as needed for Pain.    triamcinolone (KENALOG) 0.5 % ointment Apply topically 2 (two) times daily.           Clinical Reference Information           Your Vitals Were     BP Pulse Temp Resp Weight Last Period    128/76 (BP Location: Right arm, Patient Position: Sitting) 104 98.1 °F (36.7 °C) (Oral) 17 67 kg (147 lb 11.3 oz) 03/05/2017 (Within Days)    SpO2                   99%           Allergies as of 3/26/2017     No Known Allergies      Immunizations Administered on Date of Encounter - 3/26/2017     None      ED Micro, Lab, POCT     Start Ordered       Status Ordering Provider    03/26/17 0000 03/26/17 2054  POCT urine pregnancy     Comments:  This order was created through External Result Entry    Completed       ED Imaging Orders     Start Ordered       Status Ordering Provider    03/26/17 2054 03/26/17 2053  X-Ray Ankle Complete Right  1 time imaging      Final result     03/26/17 2053 03/26/17 2053  X-Ray Foot Complete Right  1 time imaging      Final result       Discharge References/Attachments     DERMATITIS, NONSPECIFIC (ENGLISH)    FIFTH METATARSAL FRACTURE, UNDERSTANDING (ENGLISH)    CRUTCHES, USING: NON-WEIGHT-BEARING (ENGLISH)      Smoking Cessation     If you would like to quit smoking:   You may be eligible for free services if you are a [place] resident and started smoking cigarettes before September  1, 1988.  Call the Smoking Cessation Trust (SCT) toll free at (262) 819-2759 or (567) 253-9543.   Call 1-800-QUIT-NOW if you do not meet the above criteria.             [facility] complies with applicable Federal civil rights laws and does not discriminate on the basis of race, color, national origin, age, disability, or sex.        Language Assistance Services     ATTENTION: Language assistance services are available, free of charge. Please call [phone].      ATENCIÓN: Si habla español, tiene a melendez disposición servicios gratuitos de asistencia lingüística. Llame al [phone].     CHÚ Ý: N?u b?n nói Ti?ng Vi?t, có các d?ch v? h? tr? ngôn ng? mi?n phí dành cho b?n. G?i s? [phone].         no

## 2022-07-13 NOTE — H&P ADULT - HISTORY OF PRESENT ILLNESS
Patient is 66F German speaking PMHx of dm-2, htn hld presenting with left hip pain x 1-2 days. Described as mild, achy, shooting pain down left buttocks to knee. Exacerbated w/ standing and position. Pain level is 8/10 in intensity, not getting better after mutiple doses of pain meds in ER. Otherwise, the patient denies any neck pain, headaches, chest pain, shortness of breath, n/v/d, abdominal pain, recent illness, fevers, chills, recent spinal/back surgeries or procedures, bowel or bladder incontinence, bowel or bladder retention, constipation, IV drug use, known cancer history, recent weight loss, trauma, weakness, other subjective neurological deficits, numbness/tingling, dysuria, hematuria, rashes. Ct Abd/Pelvis and CT Lumbar Spine :Mild motion artifact in the abdomen. No evidence of acute abdominal or pelvic abnormality.No evidence of acute lumbar spine abnormality. Mild degenerative changes of the lower lumbar spine. US negative for DVT

## 2022-07-13 NOTE — H&P ADULT - ASSESSMENT
Patient is 66F Amharic speaking PMHx of dm-2, htn hld presenting with left hip pain x 1-2 days. Described as mild, achy, shooting pain down left buttocks to knee. Exacerbated w/ standing and position. Pain level is 8/10 in intensity, not getting better after mutiple doses of pain meds in ER. Otherwise, the patient denies any neck pain, headaches, chest pain, shortness of breath, n/v/d, abdominal pain, recent illness, fevers, chills, recent spinal/back surgeries or procedures, bowel or bladder incontinence, bowel or bladder retention, constipation, IV drug use, known cancer history, recent weight loss, trauma, weakness, other subjective neurological deficits, numbness/tingling, dysuria, hematuria, rashes. Ct Abd/Pelvis and CT Lumbar Spine :Mild motion artifact in the abdomen. No evidence of acute abdominal or pelvic abnormality.No evidence of acute lumbar spine abnormality. Mild degenerative changes of the lower lumbar spine. US negative for DVT

## 2022-07-13 NOTE — H&P ADULT - PROBLEM SELECTOR PLAN 1
Left lumbar radiculopathy due to multilevel disc and DDD, moderate at L4-L5.   Adequate pain control.  PT/OT.  If not resolving, then consider MRI L-spine.

## 2022-07-13 NOTE — ED ADULT NURSE REASSESSMENT NOTE - NS ED NURSE REASSESS COMMENT FT1
Pt sleeping comfortably in bed, easy to arouse, AAOx4 when awake. Respirations equal and unlabored. No acute distress noted at this time.

## 2022-07-13 NOTE — PATIENT PROFILE ADULT - FALL HARM RISK - HARM RISK INTERVENTIONS
Assistance with ambulation/Assistance OOB with selected safe patient handling equipment/Communicate Risk of Fall with Harm to all staff/Discuss with provider need for PT consult/Monitor gait and stability/Reinforce activity limits and safety measures with patient and family/Tailored Fall Risk Interventions/Visual Cue: Yellow wristband and red socks/Bed in lowest position, wheels locked, appropriate side rails in place/Call bell, personal items and telephone in reach/Instruct patient to call for assistance before getting out of bed or chair/Non-slip footwear when patient is out of bed/[place] to call system/Physically safe environment - no spills, clutter or unnecessary equipment/Purposeful Proactive Rounding/Room/bathroom lighting operational, light cord in reach

## 2022-07-13 NOTE — H&P ADULT - NSICDXPASTMEDICALHX_GEN_ALL_CORE_FT
PAST MEDICAL HISTORY:  Diabetes mellitus     HLD (hyperlipidemia)     HTN (hypertension)     Stage 3 chronic kidney disease

## 2022-07-14 ENCOUNTER — TRANSCRIPTION ENCOUNTER (OUTPATIENT)
Age: 67
End: 2022-07-14

## 2022-07-14 LAB
A1C WITH ESTIMATED AVERAGE GLUCOSE RESULT: 8.4 % — HIGH (ref 4–5.6)
ANION GAP SERPL CALC-SCNC: 7 MMOL/L — SIGNIFICANT CHANGE UP (ref 5–17)
ANION GAP SERPL CALC-SCNC: 9 MMOL/L — SIGNIFICANT CHANGE UP (ref 5–17)
APTT BLD: 32.1 SEC — SIGNIFICANT CHANGE UP (ref 27.5–35.5)
BUN SERPL-MCNC: 24 MG/DL — HIGH (ref 7–23)
BUN SERPL-MCNC: 25 MG/DL — HIGH (ref 7–23)
CALCIUM SERPL-MCNC: 8.7 MG/DL — SIGNIFICANT CHANGE UP (ref 8.5–10.1)
CALCIUM SERPL-MCNC: 8.9 MG/DL — SIGNIFICANT CHANGE UP (ref 8.5–10.1)
CHLORIDE SERPL-SCNC: 106 MMOL/L — SIGNIFICANT CHANGE UP (ref 96–108)
CHLORIDE SERPL-SCNC: 107 MMOL/L — SIGNIFICANT CHANGE UP (ref 96–108)
CO2 SERPL-SCNC: 24 MMOL/L — SIGNIFICANT CHANGE UP (ref 22–31)
CO2 SERPL-SCNC: 25 MMOL/L — SIGNIFICANT CHANGE UP (ref 22–31)
CREAT SERPL-MCNC: 1.28 MG/DL — SIGNIFICANT CHANGE UP (ref 0.5–1.3)
CREAT SERPL-MCNC: 1.31 MG/DL — HIGH (ref 0.5–1.3)
CULTURE RESULTS: SIGNIFICANT CHANGE UP
EGFR: 45 ML/MIN/1.73M2 — LOW
EGFR: 46 ML/MIN/1.73M2 — LOW
ESTIMATED AVERAGE GLUCOSE: 194 MG/DL — HIGH (ref 68–114)
GLUCOSE BLDC GLUCOMTR-MCNC: 225 MG/DL — HIGH (ref 70–99)
GLUCOSE BLDC GLUCOMTR-MCNC: 244 MG/DL — HIGH (ref 70–99)
GLUCOSE BLDC GLUCOMTR-MCNC: 367 MG/DL — HIGH (ref 70–99)
GLUCOSE SERPL-MCNC: 216 MG/DL — HIGH (ref 70–99)
GLUCOSE SERPL-MCNC: 221 MG/DL — HIGH (ref 70–99)
HCT VFR BLD CALC: 37.6 % — SIGNIFICANT CHANGE UP (ref 34.5–45)
HGB BLD-MCNC: 13.1 G/DL — SIGNIFICANT CHANGE UP (ref 11.5–15.5)
INR BLD: 0.94 RATIO — SIGNIFICANT CHANGE UP (ref 0.88–1.16)
MCHC RBC-ENTMCNC: 30 PG — SIGNIFICANT CHANGE UP (ref 27–34)
MCHC RBC-ENTMCNC: 34.8 G/DL — SIGNIFICANT CHANGE UP (ref 32–36)
MCV RBC AUTO: 86 FL — SIGNIFICANT CHANGE UP (ref 80–100)
NRBC # BLD: 0 /100 WBCS — SIGNIFICANT CHANGE UP (ref 0–0)
PLATELET # BLD AUTO: 315 K/UL — SIGNIFICANT CHANGE UP (ref 150–400)
POTASSIUM SERPL-MCNC: 4.2 MMOL/L — SIGNIFICANT CHANGE UP (ref 3.5–5.3)
POTASSIUM SERPL-MCNC: 4.7 MMOL/L — SIGNIFICANT CHANGE UP (ref 3.5–5.3)
POTASSIUM SERPL-SCNC: 4.2 MMOL/L — SIGNIFICANT CHANGE UP (ref 3.5–5.3)
POTASSIUM SERPL-SCNC: 4.7 MMOL/L — SIGNIFICANT CHANGE UP (ref 3.5–5.3)
PROTHROM AB SERPL-ACNC: 11.2 SEC — SIGNIFICANT CHANGE UP (ref 10.5–13.4)
RBC # BLD: 4.37 M/UL — SIGNIFICANT CHANGE UP (ref 3.8–5.2)
RBC # FLD: 13.2 % — SIGNIFICANT CHANGE UP (ref 10.3–14.5)
SODIUM SERPL-SCNC: 139 MMOL/L — SIGNIFICANT CHANGE UP (ref 135–145)
SODIUM SERPL-SCNC: 139 MMOL/L — SIGNIFICANT CHANGE UP (ref 135–145)
SPECIMEN SOURCE: SIGNIFICANT CHANGE UP
WBC # BLD: 12.08 K/UL — HIGH (ref 3.8–10.5)
WBC # FLD AUTO: 12.08 K/UL — HIGH (ref 3.8–10.5)

## 2022-07-14 PROCEDURE — 72148 MRI LUMBAR SPINE W/O DYE: CPT | Mod: 26

## 2022-07-14 PROCEDURE — 99233 SBSQ HOSP IP/OBS HIGH 50: CPT

## 2022-07-14 PROCEDURE — 71045 X-RAY EXAM CHEST 1 VIEW: CPT | Mod: 26

## 2022-07-14 PROCEDURE — 72082 X-RAY EXAM ENTIRE SPI 2/3 VW: CPT | Mod: 26

## 2022-07-14 DEVICE — IMPLANTABLE DEVICE: Type: IMPLANTABLE DEVICE | Status: FUNCTIONAL

## 2022-07-14 DEVICE — SCREW LOCKG OPEN TULIP RELINE 5.5MM: Type: IMPLANTABLE DEVICE | Status: FUNCTIONAL

## 2022-07-14 DEVICE — SCREW MAS POLYAXIAL 2C RELINE 6.5X40MM: Type: IMPLANTABLE DEVICE | Status: FUNCTIONAL

## 2022-07-14 DEVICE — CAGE TLX 20 DEG 9X11X26MM STRL: Type: IMPLANTABLE DEVICE | Status: FUNCTIONAL

## 2022-07-14 RX ORDER — CYCLOBENZAPRINE HYDROCHLORIDE 10 MG/1
10 TABLET, FILM COATED ORAL EVERY 8 HOURS
Refills: 0 | Status: DISCONTINUED | OUTPATIENT
Start: 2022-07-15 | End: 2022-07-29

## 2022-07-14 RX ORDER — DEXTROSE 50 % IN WATER 50 %
25 SYRINGE (ML) INTRAVENOUS ONCE
Refills: 0 | Status: DISCONTINUED | OUTPATIENT
Start: 2022-07-15 | End: 2022-07-29

## 2022-07-14 RX ORDER — INSULIN LISPRO 100/ML
VIAL (ML) SUBCUTANEOUS AT BEDTIME
Refills: 0 | Status: DISCONTINUED | OUTPATIENT
Start: 2022-07-15 | End: 2022-07-29

## 2022-07-14 RX ORDER — GLUCAGON INJECTION, SOLUTION 0.5 MG/.1ML
1 INJECTION, SOLUTION SUBCUTANEOUS ONCE
Refills: 0 | Status: DISCONTINUED | OUTPATIENT
Start: 2022-07-15 | End: 2022-07-29

## 2022-07-14 RX ORDER — OXYCODONE AND ACETAMINOPHEN 5; 325 MG/1; MG/1
2 TABLET ORAL EVERY 6 HOURS
Refills: 0 | Status: DISCONTINUED | OUTPATIENT
Start: 2022-07-14 | End: 2022-07-15

## 2022-07-14 RX ORDER — PANTOPRAZOLE SODIUM 20 MG/1
40 TABLET, DELAYED RELEASE ORAL
Refills: 0 | Status: DISCONTINUED | OUTPATIENT
Start: 2022-07-15 | End: 2022-07-29

## 2022-07-14 RX ORDER — SODIUM CHLORIDE 9 MG/ML
1000 INJECTION, SOLUTION INTRAVENOUS
Refills: 0 | Status: DISCONTINUED | OUTPATIENT
Start: 2022-07-14 | End: 2022-07-17

## 2022-07-14 RX ORDER — DEXTROSE 50 % IN WATER 50 %
12.5 SYRINGE (ML) INTRAVENOUS ONCE
Refills: 0 | Status: DISCONTINUED | OUTPATIENT
Start: 2022-07-15 | End: 2022-07-29

## 2022-07-14 RX ORDER — SODIUM CHLORIDE 9 MG/ML
1000 INJECTION, SOLUTION INTRAVENOUS
Refills: 0 | Status: DISCONTINUED | OUTPATIENT
Start: 2022-07-15 | End: 2022-07-29

## 2022-07-14 RX ORDER — SENNA PLUS 8.6 MG/1
2 TABLET ORAL AT BEDTIME
Refills: 0 | Status: DISCONTINUED | OUTPATIENT
Start: 2022-07-15 | End: 2022-07-29

## 2022-07-14 RX ORDER — MAGNESIUM HYDROXIDE 400 MG/1
30 TABLET, CHEWABLE ORAL EVERY 12 HOURS
Refills: 0 | Status: DISCONTINUED | OUTPATIENT
Start: 2022-07-15 | End: 2022-07-29

## 2022-07-14 RX ORDER — ACETAMINOPHEN 500 MG
650 TABLET ORAL EVERY 6 HOURS
Refills: 0 | Status: DISCONTINUED | OUTPATIENT
Start: 2022-07-14 | End: 2022-07-15

## 2022-07-14 RX ORDER — CEFAZOLIN SODIUM 1 G
2000 VIAL (EA) INJECTION EVERY 8 HOURS
Refills: 0 | Status: DISCONTINUED | OUTPATIENT
Start: 2022-07-15 | End: 2022-07-16

## 2022-07-14 RX ORDER — MORPHINE SULFATE 50 MG/1
2 CAPSULE, EXTENDED RELEASE ORAL EVERY 6 HOURS
Refills: 0 | Status: DISCONTINUED | OUTPATIENT
Start: 2022-07-14 | End: 2022-07-15

## 2022-07-14 RX ORDER — ONDANSETRON 8 MG/1
4 TABLET, FILM COATED ORAL EVERY 6 HOURS
Refills: 0 | Status: DISCONTINUED | OUTPATIENT
Start: 2022-07-15 | End: 2022-07-29

## 2022-07-14 RX ORDER — DEXTROSE 50 % IN WATER 50 %
15 SYRINGE (ML) INTRAVENOUS ONCE
Refills: 0 | Status: DISCONTINUED | OUTPATIENT
Start: 2022-07-15 | End: 2022-07-29

## 2022-07-14 RX ADMIN — ATORVASTATIN CALCIUM 10 MILLIGRAM(S): 80 TABLET, FILM COATED ORAL at 11:30

## 2022-07-14 RX ADMIN — Medication 25 MILLIGRAM(S): at 05:50

## 2022-07-14 RX ADMIN — Medication 4: at 16:25

## 2022-07-14 RX ADMIN — MORPHINE SULFATE 2 MILLIGRAM(S): 50 CAPSULE, EXTENDED RELEASE ORAL at 13:01

## 2022-07-14 RX ADMIN — SODIUM CHLORIDE 50 MILLILITER(S): 9 INJECTION INTRAMUSCULAR; INTRAVENOUS; SUBCUTANEOUS at 06:07

## 2022-07-14 RX ADMIN — Medication 325 MILLIGRAM(S): at 05:49

## 2022-07-14 RX ADMIN — OXYCODONE HYDROCHLORIDE 10 MILLIGRAM(S): 5 TABLET ORAL at 08:59

## 2022-07-14 RX ADMIN — POLYETHYLENE GLYCOL 3350 17 GRAM(S): 17 POWDER, FOR SOLUTION ORAL at 11:31

## 2022-07-14 RX ADMIN — Medication 4: at 08:07

## 2022-07-14 RX ADMIN — Medication 325 MILLIGRAM(S): at 18:53

## 2022-07-14 RX ADMIN — OXYCODONE HYDROCHLORIDE 10 MILLIGRAM(S): 5 TABLET ORAL at 09:59

## 2022-07-14 RX ADMIN — MONTELUKAST 10 MILLIGRAM(S): 4 TABLET, CHEWABLE ORAL at 11:30

## 2022-07-14 RX ADMIN — Medication 10: at 11:30

## 2022-07-14 RX ADMIN — Medication 1000 MILLIGRAM(S): at 06:35

## 2022-07-14 RX ADMIN — MORPHINE SULFATE 2 MILLIGRAM(S): 50 CAPSULE, EXTENDED RELEASE ORAL at 12:31

## 2022-07-14 RX ADMIN — PANTOPRAZOLE SODIUM 40 MILLIGRAM(S): 20 TABLET, DELAYED RELEASE ORAL at 08:08

## 2022-07-14 RX ADMIN — Medication 1000 MILLIGRAM(S): at 05:49

## 2022-07-14 NOTE — PROGRESS NOTE ADULT - TIME BILLING
Lab test review, Radiology Review, Vitals review, Consultant review and discussion, Physical examination, IDR, Assessment and plan; Plan discussion with patient and son

## 2022-07-14 NOTE — DISCHARGE NOTE PROVIDER - NSDCCPCAREPLAN_GEN_ALL_CORE_FT
PRINCIPAL DISCHARGE DIAGNOSIS  Diagnosis: Cauda equina syndrome  Assessment and Plan of Treatment:       SECONDARY DISCHARGE DIAGNOSES  Diagnosis: Intractable pain  Assessment and Plan of Treatment:      PRINCIPAL DISCHARGE DIAGNOSIS  Diagnosis: Cauda equina syndrome  Assessment and Plan of Treatment: s/p decompression surgery wear your brace as directed      SECONDARY DISCHARGE DIAGNOSES  Diagnosis: Intractable pain  Assessment and Plan of Treatment: tylenol prn , home physical therapy

## 2022-07-14 NOTE — CONSULT NOTE ADULT - CONSULT REASON
r/o cauda equina  consulted at 1400 on 7/14  pt seen at 1430 r/o cauda equina  consulted at 1400 on 7/14  pt seen at 1430 on 7/14

## 2022-07-14 NOTE — DISCHARGE NOTE PROVIDER - NSDCFUADDINST_GEN_ALL_CORE_FT
1. Pain control  2. Walking with full weight bearing as tolerated, with an LSO brace for ambulation with assistive devices (walker/cane) as needed  3. Spine precautions: no bending, twisting, or lifting  3. Physical therapy as needed  4. Follow up with Dr. Christopher as outpatient in 7-10 days after discharge from the hospital or rehab, call office for appointment  5. Keep dressing clean and dry.  6. No baths/hot tubs or soaks.

## 2022-07-14 NOTE — PROGRESS NOTE ADULT - ASSESSMENT
66F Kyrgyz speaking PMHx of HTN, HLD, DM 2  presenting with left hip pain x 1-2 days. Described as mild, achy, shooting pain down left buttocks to knee. Exacerbated w/ standing and position. Pain level is 8/10 in intensity, not getting better after multiple doses of pain meds in ER. Otherwise, the patient denies any neck pain, headaches, chest pain, shortness of breath, n/v/d, abdominal pain, recent illness, fevers, chills, recent spinal/back surgeries or procedures, bowel or bladder incontinence, bowel or bladder retention, constipation, IV drug use, known cancer history, recent weight loss, trauma, weakness, other subjective neurological deficits, numbness/tingling, dysuria, hematuria, rashes. Ct Abd/Pelvis and CT Lumbar Spine :Mild motion artifact in the abdomen. No evidence of acute abdominal or pelvic abnormality. No evidence of acute lumbar spine abnormality. Mild degenerative changes of the lower lumbar spine. US negative for DVT.     EKG: NSR @80bpm       The patient's Revised Cardiac Risk index is 10.1% for a major cardiac event.   Pt is currently medically optimized for a surgical procedure.     Degenerative joint disease of Lumbar spine with associated radiculopathy.   -CT Lumbar Spine: L3-4: Disc bulge slightly asymmetric to the left. Mild bilateral facet   arthropathy. Mild spinal canal stenosis and minimal left neural foraminal   narrowing.  L4-5: Moderate disc bulge, mild bilateral facet arthropathy and   thickening of ligamenta flava. Moderate to severe spinal canal stenosis.   Mild to moderate left greater than right neural foraminal narrowing.  L5-S1: Mild disc bulge. Moderate bilateral facet arthropathy.   Mild/moderate spinal canal stenosis. Mass effect on the traversing   bilateral S1 nerve roots. Mild bilateral neural foraminal narrowing.  -f/u Orthopedic surgery consult   -cont w/ analgesics prn   -MAIA on discharge     DM II   HgA1c: 8.4% uncontrolled   hold standing insulin for now while NPO  cont w/ FS monitoring w/ insulin s/s coverage for now     Stage 3 chronic kidney disease.   avoid nephrotoxic medications, renal dose meds   cont to monitor renal function as needed     Hypertension   cont w/ Metoprolol.    Dyslipidemia.   cont w/ Atorvastatin.    DVTp: heparin   Disposition: MAIA     Plan discussed with Rob Winter [phone]

## 2022-07-14 NOTE — CONSULT NOTE ADULT - SUBJECTIVE AND OBJECTIVE BOX
66y Female presented on  with left leg pain. Orthopedics was consulted on  to rule out cauda equina syndrome.  services ([id number]) in pts native language of [place] as well as family were used to communicate and obtain history as pt does not speak English and is a poor historian. AAOx2 (not oriented to time). Pain in left posterior thigh began 4-5 days ago, sudden onset while walking in her garden. Pt denies any trauma. Pt states she's had hot flashes and chills since the pain started. She has been able to ambulate with assistance since the pain began. Pt endorses constipation and urinary incontinence since being admitted to hospital 2 days ago. Denies numbness or weakness in b/l upper and lower extremities. Pt ambulates without assistance at baseline.      PAST MEDICAL & SURGICAL HISTORY:  Diabetes mellitus      HTN (hypertension)      HLD (hyperlipidemia)      Stage 3 chronic kidney disease      Status post cholecystectomy        Home Medications:  atorvastatin 10 mg oral tablet: 1 tab(s) orally once a day (2022 22:56)  Calcium 500+D oral tablet, chewable: 1 tab(s) orally once a day (2022 22:56)  ferrous sulfate 325 mg (65 mg elemental iron) oral tablet: 1 tab(s) orally 2 times a day (2022 22:56)  folic acid 1 mg oral tablet: 1 tab(s) orally once a day (2022 22:56)  HumaLOG 100 units/mL injectable solution: 16 unit(s) injectable 3 times a day (2021 10:31)  Janumet 50 mg-500 mg oral tablet: 1 tab(s) orally 2 times a day (2021 10:31)  Lantus Solostar Pen 100 units/mL subcutaneous solution: 40 unit(s) subcutaneous once a day (at bedtime) (2021 10:32)  metFORMIN 850 mg oral tablet: 1 tab(s) orally once a day (in the morning) (2021 10:30)  metoprolol succinate 25 mg oral tablet, extended release: 1 tab(s) orally once a day (2021 10:31)  montelukast 10 mg oral tablet: 1 tab(s) orally once a day (2021 10:30)  omeprazole 40 mg oral delayed release capsule: 1 cap(s) orally once a day (2021 10:33)    Allergies    No Known Allergies                 13.1   12.08 )-----------( 315      ( 2022 16:42 )             37.6     07-14    139  |  106  |  25<H>  ----------------------------<  221<H>  4.7   |  24  |  1.31<H>    Ca    8.9      2022 16:42    TPro  7.6  /  Alb  3.4  /  TBili  0.4  /  DBili  x   /  AST  23  /  ALT  27  /  AlkPhos  33<L>  07-12    PT/INR - ( 2022 16:42 )   PT: 11.2 sec;   INR: 0.94 ratio         PTT - ( 2022 16:42 )  PTT:32.1 sec  Urinalysis Basic - ( 2022 23:41 )    Color: Yellow / Appearance: Clear / S.010 / pH: x  Gluc: x / Ketone: Negative  / Bili: Negative / Urobili: Negative mg/dL   Blood: x / Protein: 30 mg/dL / Nitrite: Negative   Leuk Esterase: Negative / RBC: 0-2 /HPF / WBC 0-2   Sq Epi: x / Non Sq Epi: Occasional / Bacteria: Negative      Vital Signs Last 24 Hrs  T(C): 37 (2022 16:10), Max: 37 (2022 16:10)  T(F): 98.6 (2022 16:10), Max: 98.6 (2022 16:10)  HR: 68 (2022 16:10) (63 - 74)  BP: 128/70 (2022 16:10) (125/75 - 147/86)  BP(mean): --  RR: 18 (2022 16:10) (17 - 18)  SpO2: 98% (2022 16:10) (95% - 98%)    Parameters below as of 2022 16:10  Patient On (Oxygen Delivery Method): room air        PHYSICAL EXAM  General: distressed 2/2 pain, Awake and Alert    PHYSICAL EXAM  GEN: NAD, AAOx2    SPINE:  NTTP over the bony prominences of the cervical // thoracic // lumbar // sacral spine  No bony step-offs  Grossly moving all extremities  + Radial Pulse  + DP/PT Pulses  No saddle anesthesia  + rectal tone      MOTOR EXAM:                          Elbow Flex (C5)     Wrist Ext (C6)     Elbow Ext (C7)      Finger Flex (C8)    Finger Abduction (T1)  RIGHT                 5/5                         5/5                         5/5                          5/5                              5/5  LEFT                    5/5                         5/5                         5/5                          5/5                              5/5                           Hip Flex (L2)      Knee Ext (L3)      Ank Dorsiflex (L4)     Hallux Ext (L5)     Ank PlantarFlex (S1)  RIGHT               5/5                      5/5                          5/5                            5/5                           5/5  LEFT                  5/5                      5/5                          5/5                            5/5                           5/5      SENSORY EXAM:                        C5      C6      C7      C8       T1          RIGHT          2         2        2         2         2          (0=absent, 1=impaired, 2=normal, NT=not testable)  LEFT             2         2        2         2         2          (0=absent, 1=impaired, 2=normal, NT=not testable)                        L2        L3       L4      L5       S1          RIGHT        2          2         2        2        2           (0=absent, 1=impaired, 2=normal, NT=not testable)  LEFT           2          2         2        2        2           (0=absent, 1=impaired, 2=normal, NT=not testable)    Negative Medel's sign bilaterally  Negative Babinski bilaterally   Negative Myoclonus bilaterally        IMAGING:  MR L spine: severe compression of spinal cord at L4-L5    Assessment/Plan:  66y Female with cauda equina syndrome    -Pain control as needed  -DVT ppx with SCDs  -WBAT  -plan for OR for spinal cord decompression today  -NPO for OR  -periop abx  -FU preop labs  -Will discuss with Dr. Christopher and advise if any changes to plan   66y Female presented on  with left leg pain. Orthopedics was consulted on  to rule out cauda equina syndrome.  services ([id number]) in pts native language of [place] as well as family were used to communicate and obtain history as pt does not speak English and is a poor historian. AAOx2 (not oriented to time). Pain in left posterior thigh began 4-5 days ago, sudden onset while walking in her garden. Pt denies any trauma. Pt states she's had hot flashes and chills since the pain started. She has been able to ambulate with assistance since the pain began. Pt endorses constipation and urinary incontinence since being admitted to hospital 2 days ago. Denies numbness or weakness in b/l upper and lower extremities. Pt ambulates without assistance at baseline.      PAST MEDICAL & SURGICAL HISTORY:  Diabetes mellitus      HTN (hypertension)      HLD (hyperlipidemia)      Stage 3 chronic kidney disease      Status post cholecystectomy        Home Medications:  atorvastatin 10 mg oral tablet: 1 tab(s) orally once a day (2022 22:56)  Calcium 500+D oral tablet, chewable: 1 tab(s) orally once a day (2022 22:56)  ferrous sulfate 325 mg (65 mg elemental iron) oral tablet: 1 tab(s) orally 2 times a day (2022 22:56)  folic acid 1 mg oral tablet: 1 tab(s) orally once a day (2022 22:56)  HumaLOG 100 units/mL injectable solution: 16 unit(s) injectable 3 times a day (2021 10:31)  Janumet 50 mg-500 mg oral tablet: 1 tab(s) orally 2 times a day (2021 10:31)  Lantus Solostar Pen 100 units/mL subcutaneous solution: 40 unit(s) subcutaneous once a day (at bedtime) (2021 10:32)  metFORMIN 850 mg oral tablet: 1 tab(s) orally once a day (in the morning) (2021 10:30)  metoprolol succinate 25 mg oral tablet, extended release: 1 tab(s) orally once a day (2021 10:31)  montelukast 10 mg oral tablet: 1 tab(s) orally once a day (2021 10:30)  omeprazole 40 mg oral delayed release capsule: 1 cap(s) orally once a day (2021 10:33)    Allergies    No Known Allergies                 13.1   12.08 )-----------( 315      ( 2022 16:42 )             37.6     07-14    139  |  106  |  25<H>  ----------------------------<  221<H>  4.7   |  24  |  1.31<H>    Ca    8.9      2022 16:42    TPro  7.6  /  Alb  3.4  /  TBili  0.4  /  DBili  x   /  AST  23  /  ALT  27  /  AlkPhos  33<L>  07-12    PT/INR - ( 2022 16:42 )   PT: 11.2 sec;   INR: 0.94 ratio         PTT - ( 2022 16:42 )  PTT:32.1 sec  Urinalysis Basic - ( 2022 23:41 )    Color: Yellow / Appearance: Clear / S.010 / pH: x  Gluc: x / Ketone: Negative  / Bili: Negative / Urobili: Negative mg/dL   Blood: x / Protein: 30 mg/dL / Nitrite: Negative   Leuk Esterase: Negative / RBC: 0-2 /HPF / WBC 0-2   Sq Epi: x / Non Sq Epi: Occasional / Bacteria: Negative      Vital Signs Last 24 Hrs  T(C): 37 (2022 16:10), Max: 37 (2022 16:10)  T(F): 98.6 (2022 16:10), Max: 98.6 (2022 16:10)  HR: 68 (2022 16:10) (63 - 74)  BP: 128/70 (2022 16:10) (125/75 - 147/86)  BP(mean): --  RR: 18 (2022 16:10) (17 - 18)  SpO2: 98% (2022 16:10) (95% - 98%)    Parameters below as of 2022 16:10  Patient On (Oxygen Delivery Method): room air        PHYSICAL EXAM  General: distressed 2/2 pain, Awake and Alert    PHYSICAL EXAM  GEN: NAD, AAOx2    SPINE:  NTTP over the bony prominences of the cervical // thoracic // lumbar // sacral spine  No bony step-offs  Grossly moving all extremities  + Radial Pulse  + DP/PT Pulses  No saddle anesthesia  + rectal tone      MOTOR EXAM:                          Elbow Flex (C5)     Wrist Ext (C6)     Elbow Ext (C7)      Finger Flex (C8)    Finger Abduction (T1)  RIGHT                 5/5                         5/5                         5/5                          5/5                              5/5  LEFT                    5/5                         5/5                         5/5                          5/5                              5/5                           Hip Flex (L2)      Knee Ext (L3)      Ank Dorsiflex (L4)     Hallux Ext (L5)     Ank PlantarFlex (S1)  RIGHT               5/5                      5/5                          5/5                            5/5                           5/5  LEFT                  5/5                      5/5                          5/5                            5/5                           5/5      SENSORY EXAM:                        C5      C6      C7      C8       T1          RIGHT          2         2        2         2         2          (0=absent, 1=impaired, 2=normal, NT=not testable)  LEFT             2         2        2         2         2          (0=absent, 1=impaired, 2=normal, NT=not testable)                        L2        L3       L4      L5       S1          RIGHT        2          2         2        2        2           (0=absent, 1=impaired, 2=normal, NT=not testable)  LEFT           2          2         2        2        2           (0=absent, 1=impaired, 2=normal, NT=not testable)    Negative Medel's sign bilaterally  Negative Babinski bilaterally   Negative Myoclonus bilaterally        IMAGING:  MR L spine: severe compression of spinal cord at L4-L5    Assessment/Plan:  66y Female with cauda equina syndrome    -Pain control as needed  -DVT ppx with SCDs  -WBAT  -plan for OR for spinal cord decompression today  -NPO for OR  -periop abx  -FU preop labs  -per medicine, medically optimized for surgery   -consent obtained from daughter who is HCP and from patient using   -Will discuss with Dr. Christopher and advise if any changes to plan   66y Female presented on  with left leg pain. Orthopedics was consulted on  to rule out cauda equina syndrome.  services ([id number]) in pts native language of [place] as well as family were used to communicate and obtain history as pt does not speak English and is a poor historian. AAOx2 (not oriented to time). Pain in left posterior thigh began 4-5 days ago, sudden onset while walking in her garden. Pt denies any trauma. Pt states she's had hot flashes and chills since the pain started. Pt ambulates without assistance at baseline. She has had difficulty with ambulation however has been able to ambulate with assistance short distances since the pain began. Pt endorses constipation and urinary incontinence since being admitted to hospital 2 days ago. Denies numbness, saddle anesthesia, or weakness in b/l upper and lower extremities. Denies previous orthopedic injury/hx.     PAST MEDICAL & SURGICAL HISTORY:  Diabetes mellitus      HTN (hypertension)      HLD (hyperlipidemia)      Stage 3 chronic kidney disease      Status post cholecystectomy        Home Medications:  atorvastatin 10 mg oral tablet: 1 tab(s) orally once a day (2022 22:56)  Calcium 500+D oral tablet, chewable: 1 tab(s) orally once a day (2022 22:56)  ferrous sulfate 325 mg (65 mg elemental iron) oral tablet: 1 tab(s) orally 2 times a day (2022 22:56)  folic acid 1 mg oral tablet: 1 tab(s) orally once a day (2022 22:56)  HumaLOG 100 units/mL injectable solution: 16 unit(s) injectable 3 times a day (2021 10:31)  Janumet 50 mg-500 mg oral tablet: 1 tab(s) orally 2 times a day (2021 10:31)  Lantus Solostar Pen 100 units/mL subcutaneous solution: 40 unit(s) subcutaneous once a day (at bedtime) (2021 10:32)  metFORMIN 850 mg oral tablet: 1 tab(s) orally once a day (in the morning) (2021 10:30)  metoprolol succinate 25 mg oral tablet, extended release: 1 tab(s) orally once a day (2021 10:31)  montelukast 10 mg oral tablet: 1 tab(s) orally once a day (2021 10:30)  omeprazole 40 mg oral delayed release capsule: 1 cap(s) orally once a day (2021 10:33)    Allergies    No Known Allergies                 13.1   12.08 )-----------( 315      ( 2022 16:42 )             37.6     07-14    139  |  106  |  25<H>  ----------------------------<  221<H>  4.7   |  24  |  1.31<H>    Ca    8.9      2022 16:42    TPro  7.6  /  Alb  3.4  /  TBili  0.4  /  DBili  x   /  AST  23  /  ALT  27  /  AlkPhos  33<L>  07-12    PT/INR - ( 2022 16:42 )   PT: 11.2 sec;   INR: 0.94 ratio         PTT - ( 2022 16:42 )  PTT:32.1 sec  Urinalysis Basic - ( 2022 23:41 )    Color: Yellow / Appearance: Clear / S.010 / pH: x  Gluc: x / Ketone: Negative  / Bili: Negative / Urobili: Negative mg/dL   Blood: x / Protein: 30 mg/dL / Nitrite: Negative   Leuk Esterase: Negative / RBC: 0-2 /HPF / WBC 0-2   Sq Epi: x / Non Sq Epi: Occasional / Bacteria: Negative      Vital Signs Last 24 Hrs  T(C): 37 (2022 16:10), Max: 37 (2022 16:10)  T(F): 98.6 (2022 16:10), Max: 98.6 (2022 16:10)  HR: 68 (2022 16:10) (63 - 74)  BP: 128/70 (2022 16:10) (125/75 - 147/86)  BP(mean): --  RR: 18 (2022 16:10) (17 - 18)  SpO2: 98% (2022 16:10) (95% - 98%)    Parameters below as of 2022 16:10  Patient On (Oxygen Delivery Method): room air        PHYSICAL EXAM  General: distressed 2/2 pain, Awake and Alert    PHYSICAL EXAM  GEN: NAD, AAOx2    SPINE:  NTTP over the bony prominences of the cervical // thoracic // lumbar // sacral spine  No bony step-offs  Grossly moving all extremities  + Radial Pulse  + DP/PT Pulses  No saddle anesthesia  + active and passive rectal tone  +SLR Bilaterally  unable to ambulate 2/2 pain     MOTOR EXAM:                          Elbow Flex (C5)     Wrist Ext (C6)     Elbow Ext (C7)      Finger Flex (C8)    Finger Abduction (T1)  RIGHT                 5/5                         5/5                         5/5                          5/5                              5/5  LEFT                    5/5                         5/5                         5/5                          5/5                              5/5                           Hip Flex (L2)      Knee Ext (L3)      Ank Dorsiflex (L4)     Hallux Ext (L5)     Ank PlantarFlex (S1)  RIGHT               5/5                      5/5                          5/5                            5/5                           5/5  LEFT                  5/5                      5/5                          5/5                            5/5                           5/5      SENSORY EXAM:                        C5      C6      C7      C8       T1          RIGHT          2         2        2         2         2          (0=absent, 1=impaired, 2=normal, NT=not testable)  LEFT             2         2        2         2         2          (0=absent, 1=impaired, 2=normal, NT=not testable)                        L2        L3       L4      L5       S1          RIGHT        2          2         2        2        2           (0=absent, 1=impaired, 2=normal, NT=not testable)  LEFT           2          2         2        2        2           (0=absent, 1=impaired, 2=normal, NT=not testable)    Negative Medel's sign bilaterally  Negative Babinski bilaterally   Negative Myoclonus bilaterally        IMAGING:  MR L spine: Large paracentral herniated disc at L4-L5 with compression of traversing nerve roots and cauda equina. Additionally, an L4 on L5 grade 1 Spondylolisthesis is appreciated. Pending official read.     Assessment/Plan:  66y Female with large paracentral herniated disc at L4-5 with suspected resultant cauda equina syndrome. L4/5 Spondy.     -plan for OR for spinal cord decompression and fusion of L4-5 today, with additional levels as needed, will consent and preop patient  -Pain control as needed  -DVT ppx with SCDs, HOLD ALL CHEMICAL DVT PPX   -NWB, strict bedrest with spine precautions  -NPO for OR  -periop abx  -FU preop labs  -Appreciate medical care, please document clearance for surgery.   -consent obtained from daughter who is HCP and from patient using   -Discussed with Dr. Christopher who agrees with plan

## 2022-07-14 NOTE — PROGRESS NOTE ADULT - SUBJECTIVE AND OBJECTIVE BOX
Patient is a 66y old  Female who presents with a chief complaint of Left leg/thigh pain. (2022 04:23)      INTERVAL HPI/ OVERNIGHT EVENTS: Pt was seen and examined at bedside today, No significant overnight events, pt c/o severe lower back pain w/ radiation to b/l Legs, and decrease sensation, difficulty urinating and constipation, pt is unclear if she has leg weakness      MEDICATIONS  (STANDING):  atorvastatin Oral Tab/Cap - Peds 10 milliGRAM(s) Oral daily  dextrose 5%. 1000 milliLiter(s) (50 mL/Hr) IV Continuous <Continuous>  dextrose 50% Injectable 25 Gram(s) IV Push once  ferrous    sulfate 325 milliGRAM(s) Oral two times a day  glucagon  Injectable 1 milliGRAM(s) IntraMuscular once  insulin lispro (ADMELOG) corrective regimen sliding scale   SubCutaneous three times a day before meals  insulin lispro (ADMELOG) corrective regimen sliding scale   SubCutaneous at bedtime  metoprolol succinate ER 25 milliGRAM(s) Oral daily  montelukast 10 milliGRAM(s) Oral daily  naloxone Injectable 0.4 milliGRAM(s) IV Push once  pantoprazole    Tablet 40 milliGRAM(s) Oral before breakfast  polyethylene glycol 3350 17 Gram(s) Oral daily  senna 2 Tablet(s) Oral at bedtime  sodium chloride 0.9%. 1000 milliLiter(s) (50 mL/Hr) IV Continuous <Continuous>    MEDICATIONS  (PRN):  acetaminophen     Tablet .. 650 milliGRAM(s) Oral every 6 hours PRN Temp greater or equal to 38C (100.4F), Mild Pain (1 - 3)  aluminum hydroxide/magnesium hydroxide/simethicone Suspension 30 milliLiter(s) Oral every 4 hours PRN Dyspepsia  bisacodyl 5 milliGRAM(s) Oral daily PRN Constipation  dextrose Oral Gel 15 Gram(s) Oral once PRN Blood Glucose LESS THAN 70 milliGRAM(s)/deciliter  melatonin 3 milliGRAM(s) Oral at bedtime PRN Insomnia  morphine  - Injectable 2 milliGRAM(s) IV Push every 6 hours PRN Severe Pain (7 - 10)  ondansetron Injectable 4 milliGRAM(s) IV Push every 8 hours PRN Nausea and/or Vomiting  oxycodone    5 mG/acetaminophen 325 mG 2 Tablet(s) Oral every 6 hours PRN Moderate Pain (4 - 6)      Allergies    No Known Allergies    Intolerances        REVIEW OF SYSTEMS:    Unable to examine due to [ ] Encephalopathy [ ] Advanced Dementia [ ] Expressive Aphasia [ ] Non-verbal patient    CONSTITUTIONAL: No fever, NO generalized weakness/Fatigue, No weight loss  EYES: No eye pain, visual disturbances, or discharge  ENMT:  No difficulty hearing, tinnitus, vertigo; No sinus or throat pain  NECK: No pain or stiffness  RESPIRATORY: No shortness of breath,  cough, wheezing, sputum or hemoptysis   CARDIOVASCULAR: No chest pain, palpitations, or leg swelling  GASTROINTESTINAL: +constipation, No abdominal pain. No nausea, vomiting, diarrhea or constipation. No melena or hematochezia.  GENITOURINARY: decrease urine stream   NEUROLOGICAL: No headaches, Dizziness, memory loss, loss of strength, or tremors  SKIN: No itching, burning, rashes, or lesions   MUSCULOSKELETAL: lower back pain radiating to b/l feet  PSYCHIATRIC: No depression, anxiety, mood swings, or difficulty sleeping  HEME/LYMPH: No easy bruising, or bleeding gums      Vital Signs Last 24 Hrs  T(C): 37 (2022 16:10), Max: 37 (2022 16:10)  T(F): 98.6 (2022 16:10), Max: 98.6 (2022 16:10)  HR: 68 (2022 16:10) (63 - 74)  BP: 128/70 (2022 16:10) (125/75 - 148/73)  BP(mean): --  RR: 18 (2022 16:10) (17 - 18)  SpO2: 98% (2022 16:10) (95% - 98%)    Parameters below as of 2022 16:10  Patient On (Oxygen Delivery Method): room air        PHYSICAL EXAM:  GENERAL: Pt appears to be uncomfortable in pain,  well-developed, well-groomed  HEAD:  Atraumatic, Normocephalic  EYES: conjunctiva and sclera clear  ENMT: Moist mucous membranes  NECK: Supple, No JVD, Normal thyroid  CHEST/LUNG: Clear to Auscultation bilaterally; No rales, rhonchi, wheezing, or rubs  HEART: Regular rate and rhythm; No murmurs, rubs, or gallops  ABDOMEN: Soft, Nontender, Nondistended; Bowel sounds present  EXTREMITIES:  +lumbar tenderness w/ radiation to b/l feet, 2+ Peripheral Pulses, No clubbing, cyanosis, or edema  SKIN: No rashes or lesions  NERVOUS SYSTEM:  Alert & Oriented X3, Good concentration; Motor Strength 5/5 B/L upper extremities, Lower extremity strength unable to be assess due to severe pain     LABS:                        12.8   10.07 )-----------( 289      ( 2022 23:14 )             37.4         139  |  107  |  24<H>  ----------------------------<  216<H>  4.2   |  25  |  1.28    Ca    8.7      2022 05:16    TPro  7.6  /  Alb  3.4  /  TBili  0.4  /  DBili  x   /  AST  23  /  ALT  27  /  AlkPhos  33<L>      PT/INR - ( 2022 00:06 )   PT: 11.2 sec;   INR: 0.94 ratio         PTT - ( 2022 00:06 )  PTT:34.4 sec  Urinalysis Basic - ( 2022 23:41 )    Color: Yellow / Appearance: Clear / S.010 / pH: x  Gluc: x / Ketone: Negative  / Bili: Negative / Urobili: Negative mg/dL   Blood: x / Protein: 30 mg/dL / Nitrite: Negative   Leuk Esterase: Negative / RBC: 0-2 /HPF / WBC 0-2   Sq Epi: x / Non Sq Epi: Occasional / Bacteria: Negative      CAPILLARY BLOOD GLUCOSE      POCT Blood Glucose.: 225 mg/dL (2022 15:51)  POCT Blood Glucose.: 367 mg/dL (2022 10:40)  POCT Blood Glucose.: 244 mg/dL (2022 07:55)  POCT Blood Glucose.: 440 mg/dL (2022 21:35)  POCT Blood Glucose.: 457 mg/dL (2022 21:33)        Culture - Urine (collected 22)  Source: Clean Catch Clean Catch (Midstream)  Final Report (22):    <10,000 CFU/mL Normal Urogenital Mireille        RADIOLOGY & ADDITIONAL TESTS:          Imaging Personally Reviewed:  [ ] YES  [ ] NO    Consultant(s) Notes Reviewed:  [ ] YES  [ ] NO    Care Discussed with Consultants/Other Providers [x ] YES  [ ] NO

## 2022-07-14 NOTE — DISCHARGE NOTE PROVIDER - CARE PROVIDER_API CALL
Nena Christopher (DO)  Orthopaedic Surgery Surgery  [address] [facility], [address] [place] [address]  Phone: [phone]  Fax: [phone]  Follow Up Time:

## 2022-07-14 NOTE — DISCHARGE NOTE PROVIDER - NSDCMRMEDTOKEN_GEN_ALL_CORE_FT
atorvastatin 10 mg oral tablet: 1 tab(s) orally once a day  Augmentin 875 mg-125 mg oral tablet: 1 milligram(s) orally 2 times a day MDD:2 tabs  Calcium 500+D oral tablet, chewable: 1 tab(s) orally once a day  Cipro 500 mg oral tablet: 1 tab(s) orally every 12 hours   ferrous sulfate 325 mg (65 mg elemental iron) oral tablet: 1 tab(s) orally 2 times a day  folic acid 1 mg oral tablet: 1 tab(s) orally once a day  HumaLOG 100 units/mL injectable solution: 16 unit(s) injectable 3 times a day  Janumet 50 mg-500 mg oral tablet: 1 tab(s) orally 2 times a day  Lantus Solostar Pen 100 units/mL subcutaneous solution: 40 unit(s) subcutaneous once a day (at bedtime)  LSO: LSO  R26.2  Inability to ambulate  Wear with ambulation and PT/OT  metFORMIN 850 mg oral tablet: 1 tab(s) orally once a day (in the morning)  metoprolol succinate 25 mg oral tablet, extended release: 1 tab(s) orally once a day  metroNIDAZOLE 500 mg oral tablet: 1 tab(s) orally 3 times a day   montelukast 10 mg oral tablet: 1 tab(s) orally once a day  omeprazole 40 mg oral delayed release capsule: 1 cap(s) orally once a day   atorvastatin 10 mg oral tablet: 1 tab(s) orally once a day  Augmentin 875 mg-125 mg oral tablet: 1 milligram(s) orally 2 times a day MDD:2 tabs  Calcium 500+D oral tablet, chewable: 1 tab(s) orally once a day  Cipro 500 mg oral tablet: 1 tab(s) orally every 12 hours   ferrous sulfate 325 mg (65 mg elemental iron) oral tablet: 1 tab(s) orally 2 times a day  folic acid 1 mg oral tablet: 1 tab(s) orally once a day  HumaLOG 100 units/mL injectable solution: 16 unit(s) injectable 3 times a day  Janumet 50 mg-500 mg oral tablet: 1 tab(s) orally 2 times a day  Lantus Solostar Pen 100 units/mL subcutaneous solution: 40 unit(s) subcutaneous once a day (at bedtime)  LSO: LSO  R26.2  Inability to ambulate  Wear with ambulation and PT/OT  metFORMIN 850 mg oral tablet: 1 tab(s) orally once a day (in the morning)  metoprolol succinate 25 mg oral tablet, extended release: 1 tab(s) orally once a day  metroNIDAZOLE 500 mg oral tablet: 1 tab(s) orally 3 times a day   montelukast 10 mg oral tablet: 1 tab(s) orally once a day  omeprazole 40 mg oral delayed release capsule: 1 cap(s) orally once a day  physical therapy : pt seen by physical therapy while inpatient at [facility] physical therapy    A+D topical ointment: 1 application topically 2 times a day  acetaminophen 325 mg oral tablet: 2 tab(s) orally every 6 hours, As needed, Temp greater or equal to 38C (100.4F), Mild Pain (1 - 3)  atorvastatin 10 mg oral tablet: 1 tab(s) orally once a day  Augmentin 875 mg-125 mg oral tablet: 1 milligram(s) orally 2 times a day MDD:2 tabs  Calcium 500+D oral tablet, chewable: 1 tab(s) orally once a day  ferrous sulfate 325 mg (65 mg elemental iron) oral tablet: 1 tab(s) orally 2 times a day  folic acid 1 mg oral tablet: 1 tab(s) orally once a day  insulin glargine 100 units/mL subcutaneous solution: 30 unit(s) subcutaneous once a day (at bedtime)  insulin lispro 100 units/mL injectable solution: 10  injectable 3 times a day  Janumet 50 mg-500 mg oral tablet: 1 tab(s) orally 2 times a day  LSO: LSO  R26.2  Inability to ambulate  Wear with ambulation and PT/OT  metoprolol succinate 25 mg oral tablet, extended release: 1 tab(s) orally once a day  montelukast 10 mg oral tablet: 1 tab(s) orally once a day  omeprazole 40 mg oral delayed release capsule: 1 cap(s) orally once a day  physical therapy : pt seen by physical therapy while inpatient at [facility] physical therapy

## 2022-07-14 NOTE — DISCHARGE NOTE PROVIDER - HOSPITAL COURSE
65 yo female pmh of DJD , Dm, hld, ckd, came to er for left hip pain and inability to ambulate 67 yo female pmh of DJD , Dm, hld, ckd, came to er for left hip pain and inability to ambulate , mri positive for l4-l5  stenosis orthopedics consulted and decompression surgery performed 7/13 . pt evaluated by physical therapy and lumbar brace ordered for use with ambulation . Blood glucose elevated requiring adjustment of medication and addition of metformin . Recovery significant for episode of Urinary retention with catheter placement . Urology consulted and pt passed trial of void .pt consuolt suggest pt stable for discharge home with home physical therapy .

## 2022-07-15 ENCOUNTER — TRANSCRIPTION ENCOUNTER (OUTPATIENT)
Age: 67
End: 2022-07-15

## 2022-07-15 LAB
ANION GAP SERPL CALC-SCNC: 12 MMOL/L — SIGNIFICANT CHANGE UP (ref 5–17)
BASOPHILS # BLD AUTO: 0.07 K/UL — SIGNIFICANT CHANGE UP (ref 0–0.2)
BASOPHILS NFR BLD AUTO: 0.5 % — SIGNIFICANT CHANGE UP (ref 0–2)
BUN SERPL-MCNC: 27 MG/DL — HIGH (ref 7–23)
CALCIUM SERPL-MCNC: 8.4 MG/DL — LOW (ref 8.5–10.1)
CHLORIDE SERPL-SCNC: 106 MMOL/L — SIGNIFICANT CHANGE UP (ref 96–108)
CO2 SERPL-SCNC: 20 MMOL/L — LOW (ref 22–31)
CREAT SERPL-MCNC: 1.56 MG/DL — HIGH (ref 0.5–1.3)
EGFR: 37 ML/MIN/1.73M2 — LOW
EOSINOPHIL # BLD AUTO: 0.3 K/UL — SIGNIFICANT CHANGE UP (ref 0–0.5)
EOSINOPHIL NFR BLD AUTO: 2 % — SIGNIFICANT CHANGE UP (ref 0–6)
GLUCOSE BLDC GLUCOMTR-MCNC: 252 MG/DL — HIGH (ref 70–99)
GLUCOSE BLDC GLUCOMTR-MCNC: 257 MG/DL — HIGH (ref 70–99)
GLUCOSE BLDC GLUCOMTR-MCNC: 445 MG/DL — HIGH (ref 70–99)
GLUCOSE BLDC GLUCOMTR-MCNC: 456 MG/DL — CRITICAL HIGH (ref 70–99)
GLUCOSE BLDC GLUCOMTR-MCNC: 457 MG/DL — CRITICAL HIGH (ref 70–99)
GLUCOSE BLDC GLUCOMTR-MCNC: 535 MG/DL — CRITICAL HIGH (ref 70–99)
GLUCOSE BLDC GLUCOMTR-MCNC: 554 MG/DL — CRITICAL HIGH (ref 70–99)
GLUCOSE SERPL-MCNC: 270 MG/DL — HIGH (ref 70–99)
HCT VFR BLD CALC: 36.5 % — SIGNIFICANT CHANGE UP (ref 34.5–45)
HGB BLD-MCNC: 11.7 G/DL — SIGNIFICANT CHANGE UP (ref 11.5–15.5)
IMM GRANULOCYTES NFR BLD AUTO: 0.8 % — SIGNIFICANT CHANGE UP (ref 0–1.5)
LYMPHOCYTES # BLD AUTO: 24.3 % — SIGNIFICANT CHANGE UP (ref 13–44)
LYMPHOCYTES # BLD AUTO: 3.72 K/UL — HIGH (ref 1–3.3)
MCHC RBC-ENTMCNC: 28.9 PG — SIGNIFICANT CHANGE UP (ref 27–34)
MCHC RBC-ENTMCNC: 32.1 G/DL — SIGNIFICANT CHANGE UP (ref 32–36)
MCV RBC AUTO: 90.1 FL — SIGNIFICANT CHANGE UP (ref 80–100)
MONOCYTES # BLD AUTO: 0.73 K/UL — SIGNIFICANT CHANGE UP (ref 0–0.9)
MONOCYTES NFR BLD AUTO: 4.8 % — SIGNIFICANT CHANGE UP (ref 2–14)
NEUTROPHILS # BLD AUTO: 10.37 K/UL — HIGH (ref 1.8–7.4)
NEUTROPHILS NFR BLD AUTO: 67.6 % — SIGNIFICANT CHANGE UP (ref 43–77)
NRBC # BLD: 0 /100 WBCS — SIGNIFICANT CHANGE UP (ref 0–0)
PLATELET # BLD AUTO: 300 K/UL — SIGNIFICANT CHANGE UP (ref 150–400)
POTASSIUM SERPL-MCNC: 5 MMOL/L — SIGNIFICANT CHANGE UP (ref 3.5–5.3)
POTASSIUM SERPL-SCNC: 5 MMOL/L — SIGNIFICANT CHANGE UP (ref 3.5–5.3)
RBC # BLD: 4.05 M/UL — SIGNIFICANT CHANGE UP (ref 3.8–5.2)
RBC # FLD: 13.4 % — SIGNIFICANT CHANGE UP (ref 10.3–14.5)
SODIUM SERPL-SCNC: 138 MMOL/L — SIGNIFICANT CHANGE UP (ref 135–145)
WBC # BLD: 15.31 K/UL — HIGH (ref 3.8–10.5)
WBC # FLD AUTO: 15.31 K/UL — HIGH (ref 3.8–10.5)

## 2022-07-15 PROCEDURE — 93010 ELECTROCARDIOGRAM REPORT: CPT

## 2022-07-15 PROCEDURE — 99232 SBSQ HOSP IP/OBS MODERATE 35: CPT

## 2022-07-15 RX ORDER — HYDROMORPHONE HYDROCHLORIDE 2 MG/ML
1 INJECTION INTRAMUSCULAR; INTRAVENOUS; SUBCUTANEOUS EVERY 4 HOURS
Refills: 0 | Status: DISCONTINUED | OUTPATIENT
Start: 2022-07-15 | End: 2022-07-20

## 2022-07-15 RX ORDER — ACETAMINOPHEN 500 MG
650 TABLET ORAL EVERY 6 HOURS
Refills: 0 | Status: DISCONTINUED | OUTPATIENT
Start: 2022-07-15 | End: 2022-07-15

## 2022-07-15 RX ORDER — ACETAMINOPHEN 500 MG
650 TABLET ORAL EVERY 6 HOURS
Refills: 0 | Status: DISCONTINUED | OUTPATIENT
Start: 2022-07-15 | End: 2022-07-29

## 2022-07-15 RX ORDER — INSULIN LISPRO 100/ML
8 VIAL (ML) SUBCUTANEOUS ONCE
Refills: 0 | Status: COMPLETED | OUTPATIENT
Start: 2022-07-15 | End: 2022-07-15

## 2022-07-15 RX ORDER — SODIUM CHLORIDE 9 MG/ML
1000 INJECTION, SOLUTION INTRAVENOUS
Refills: 0 | Status: DISCONTINUED | OUTPATIENT
Start: 2022-07-15 | End: 2022-07-15

## 2022-07-15 RX ORDER — OXYCODONE HYDROCHLORIDE 5 MG/1
10 TABLET ORAL EVERY 4 HOURS
Refills: 0 | Status: DISCONTINUED | OUTPATIENT
Start: 2022-07-15 | End: 2022-07-15

## 2022-07-15 RX ORDER — DEXAMETHASONE 0.5 MG/5ML
6 ELIXIR ORAL EVERY 8 HOURS
Refills: 0 | Status: DISCONTINUED | OUTPATIENT
Start: 2022-07-15 | End: 2022-07-15

## 2022-07-15 RX ORDER — HYDROMORPHONE HYDROCHLORIDE 2 MG/ML
0.5 INJECTION INTRAMUSCULAR; INTRAVENOUS; SUBCUTANEOUS
Refills: 0 | Status: DISCONTINUED | OUTPATIENT
Start: 2022-07-15 | End: 2022-07-15

## 2022-07-15 RX ORDER — OXYCODONE AND ACETAMINOPHEN 5; 325 MG/1; MG/1
2 TABLET ORAL EVERY 4 HOURS
Refills: 0 | Status: DISCONTINUED | OUTPATIENT
Start: 2022-07-15 | End: 2022-07-18

## 2022-07-15 RX ORDER — INSULIN HUMAN 100 [IU]/ML
2 INJECTION, SOLUTION SUBCUTANEOUS ONCE
Refills: 0 | Status: COMPLETED | OUTPATIENT
Start: 2022-07-15 | End: 2022-07-15

## 2022-07-15 RX ORDER — INSULIN LISPRO 100/ML
2 VIAL (ML) SUBCUTANEOUS ONCE
Refills: 0 | Status: DISCONTINUED | OUTPATIENT
Start: 2022-07-15 | End: 2022-07-15

## 2022-07-15 RX ORDER — OXYCODONE HYDROCHLORIDE 5 MG/1
5 TABLET ORAL EVERY 6 HOURS
Refills: 0 | Status: DISCONTINUED | OUTPATIENT
Start: 2022-07-15 | End: 2022-07-15

## 2022-07-15 RX ORDER — HYDROMORPHONE HYDROCHLORIDE 2 MG/ML
1 INJECTION INTRAMUSCULAR; INTRAVENOUS; SUBCUTANEOUS
Refills: 0 | Status: DISCONTINUED | OUTPATIENT
Start: 2022-07-15 | End: 2022-07-15

## 2022-07-15 RX ORDER — INSULIN LISPRO 100/ML
VIAL (ML) SUBCUTANEOUS
Refills: 0 | Status: DISCONTINUED | OUTPATIENT
Start: 2022-07-15 | End: 2022-07-29

## 2022-07-15 RX ORDER — HYDROMORPHONE HYDROCHLORIDE 2 MG/ML
0.5 INJECTION INTRAMUSCULAR; INTRAVENOUS; SUBCUTANEOUS EVERY 4 HOURS
Refills: 0 | Status: DISCONTINUED | OUTPATIENT
Start: 2022-07-15 | End: 2022-07-15

## 2022-07-15 RX ORDER — METOCLOPRAMIDE HCL 10 MG
10 TABLET ORAL ONCE
Refills: 0 | Status: DISCONTINUED | OUTPATIENT
Start: 2022-07-15 | End: 2022-07-15

## 2022-07-15 RX ORDER — TRAMADOL HYDROCHLORIDE 50 MG/1
50 TABLET ORAL EVERY 6 HOURS
Refills: 0 | Status: DISCONTINUED | OUTPATIENT
Start: 2022-07-15 | End: 2022-07-15

## 2022-07-15 RX ORDER — INSULIN LISPRO 100/ML
VIAL (ML) SUBCUTANEOUS
Refills: 0 | Status: DISCONTINUED | OUTPATIENT
Start: 2022-07-15 | End: 2022-07-15

## 2022-07-15 RX ADMIN — PANTOPRAZOLE SODIUM 40 MILLIGRAM(S): 20 TABLET, DELAYED RELEASE ORAL at 08:16

## 2022-07-15 RX ADMIN — HYDROMORPHONE HYDROCHLORIDE 1 MILLIGRAM(S): 2 INJECTION INTRAMUSCULAR; INTRAVENOUS; SUBCUTANEOUS at 10:52

## 2022-07-15 RX ADMIN — Medication 650 MILLIGRAM(S): at 05:49

## 2022-07-15 RX ADMIN — MORPHINE SULFATE 2 MILLIGRAM(S): 50 CAPSULE, EXTENDED RELEASE ORAL at 02:50

## 2022-07-15 RX ADMIN — SODIUM CHLORIDE 50 MILLILITER(S): 9 INJECTION INTRAMUSCULAR; INTRAVENOUS; SUBCUTANEOUS at 02:50

## 2022-07-15 RX ADMIN — CYCLOBENZAPRINE HYDROCHLORIDE 10 MILLIGRAM(S): 10 TABLET, FILM COATED ORAL at 21:09

## 2022-07-15 RX ADMIN — HYDROMORPHONE HYDROCHLORIDE 1 MILLIGRAM(S): 2 INJECTION INTRAMUSCULAR; INTRAVENOUS; SUBCUTANEOUS at 23:55

## 2022-07-15 RX ADMIN — Medication 3: at 08:13

## 2022-07-15 RX ADMIN — Medication 100 MILLIGRAM(S): at 04:38

## 2022-07-15 RX ADMIN — HYDROMORPHONE HYDROCHLORIDE 0.5 MILLIGRAM(S): 2 INJECTION INTRAMUSCULAR; INTRAVENOUS; SUBCUTANEOUS at 00:25

## 2022-07-15 RX ADMIN — Medication 325 MILLIGRAM(S): at 17:12

## 2022-07-15 RX ADMIN — ATORVASTATIN CALCIUM 10 MILLIGRAM(S): 80 TABLET, FILM COATED ORAL at 11:33

## 2022-07-15 RX ADMIN — MORPHINE SULFATE 2 MILLIGRAM(S): 50 CAPSULE, EXTENDED RELEASE ORAL at 03:13

## 2022-07-15 RX ADMIN — Medication 100 MILLIGRAM(S): at 11:33

## 2022-07-15 RX ADMIN — HYDROMORPHONE HYDROCHLORIDE 1 MILLIGRAM(S): 2 INJECTION INTRAMUSCULAR; INTRAVENOUS; SUBCUTANEOUS at 11:22

## 2022-07-15 RX ADMIN — Medication 2 UNIT(S): at 12:05

## 2022-07-15 RX ADMIN — HYDROMORPHONE HYDROCHLORIDE 1 MILLIGRAM(S): 2 INJECTION INTRAMUSCULAR; INTRAVENOUS; SUBCUTANEOUS at 23:16

## 2022-07-15 RX ADMIN — SODIUM CHLORIDE 75 MILLILITER(S): 9 INJECTION, SOLUTION INTRAVENOUS at 00:24

## 2022-07-15 RX ADMIN — SENNA PLUS 2 TABLET(S): 8.6 TABLET ORAL at 21:09

## 2022-07-15 RX ADMIN — Medication 4: at 21:57

## 2022-07-15 RX ADMIN — Medication 25 MILLIGRAM(S): at 05:47

## 2022-07-15 RX ADMIN — HYDROMORPHONE HYDROCHLORIDE 0.5 MILLIGRAM(S): 2 INJECTION INTRAMUSCULAR; INTRAVENOUS; SUBCUTANEOUS at 00:35

## 2022-07-15 RX ADMIN — MONTELUKAST 10 MILLIGRAM(S): 4 TABLET, CHEWABLE ORAL at 11:33

## 2022-07-15 RX ADMIN — Medication 8 UNIT(S): at 12:23

## 2022-07-15 RX ADMIN — HYDROMORPHONE HYDROCHLORIDE 0.5 MILLIGRAM(S): 2 INJECTION INTRAMUSCULAR; INTRAVENOUS; SUBCUTANEOUS at 00:15

## 2022-07-15 RX ADMIN — Medication 12: at 15:59

## 2022-07-15 RX ADMIN — CYCLOBENZAPRINE HYDROCHLORIDE 10 MILLIGRAM(S): 10 TABLET, FILM COATED ORAL at 05:44

## 2022-07-15 RX ADMIN — INSULIN HUMAN 2 UNIT(S): 100 INJECTION, SOLUTION SUBCUTANEOUS at 00:39

## 2022-07-15 RX ADMIN — POLYETHYLENE GLYCOL 3350 17 GRAM(S): 17 POWDER, FOR SOLUTION ORAL at 11:34

## 2022-07-15 RX ADMIN — CYCLOBENZAPRINE HYDROCHLORIDE 10 MILLIGRAM(S): 10 TABLET, FILM COATED ORAL at 14:21

## 2022-07-15 RX ADMIN — Medication 325 MILLIGRAM(S): at 05:45

## 2022-07-15 RX ADMIN — HYDROMORPHONE HYDROCHLORIDE 0.5 MILLIGRAM(S): 2 INJECTION INTRAMUSCULAR; INTRAVENOUS; SUBCUTANEOUS at 00:50

## 2022-07-15 RX ADMIN — HYDROMORPHONE HYDROCHLORIDE 0.5 MILLIGRAM(S): 2 INJECTION INTRAMUSCULAR; INTRAVENOUS; SUBCUTANEOUS at 06:15

## 2022-07-15 RX ADMIN — Medication 100 MILLIGRAM(S): at 21:10

## 2022-07-15 RX ADMIN — HYDROMORPHONE HYDROCHLORIDE 0.5 MILLIGRAM(S): 2 INJECTION INTRAMUSCULAR; INTRAVENOUS; SUBCUTANEOUS at 05:48

## 2022-07-15 RX ADMIN — Medication 6 MILLIGRAM(S): at 05:46

## 2022-07-15 NOTE — PROGRESS NOTE ADULT - SUBJECTIVE AND OBJECTIVE BOX
Patient tolerated the procedure well. Patient seen and examined at bedside. Patient still complains of severe back pain radiating into both legs. Denies weakness, numbness or tingling. Denies chest pain, shortness of breath, nausea or vomiting.       VITAL SIGNS:  T(C): 36.7 (07-15-22 @ 04:25), Max: 37 (07-14-22 @ 16:10)  HR: 81 (07-15-22 @ 04:25) (63 - 92)  BP: 154/83 (07-15-22 @ 04:25) (95/59 - 154/83)  RR: 18 (07-15-22 @ 04:25) (16 - 21)  SpO2: 98% (07-15-22 @ 04:25) (94% - 100%)      LABS:                        11.7   15.31 )-----------( 300      ( 15 Jul 2022 00:10 )             36.5     07-15    138  |  106  |  27<H>  ----------------------------<  270<H>  5.0   |  20<L>  |  1.56<H>    Ca    8.4<L>      15 Jul 2022 00:10      PT/INR - ( 14 Jul 2022 16:42 )   PT: 11.2 sec;   INR: 0.94 ratio         PTT - ( 14 Jul 2022 16:42 )  PTT:32.1 sec            PE  General: awake, alert, acutely distressed     Dressing C/D/I  Drain present   +TTP over Lspine  2+ radial pulses  2+ DP Pulses    Motor:                   C5                C6              C7               C8           T1   R             5/5                5/5            5/5              5/5          5/5  L             5/5                5/5            5/5              5/5          5/5                    L2                  L3             L4              L5            S1  R            5/5                5/5             5/5            5/5          5/5  L             5/5                5/5            5/5            5/5          5/5    Sensory:            C5         C6         C7      C8       T1        (0=absent, 1=impaired, 2=normal, NT=not testable)  R         2            2           2        2         2  L          2            2           2        2         2               L2          L3         L4      L5       S1         (0=absent, 1=impaired, 2=normal, NT=not testable)  R         2            2            2        2        2  L          2            2           2        2         2                       A/P:  66y f s/p L4-5 decompression, TLIF POD 1, Stable  -PT/OT -WBAT  -lumbosacral orthosis brace to be worn with ambulation/PT, orthotist to deliver to bedside   -Continue ANCEF 2g while drains are in  -Record DALLIN output  -Pain Control  -IV Decadron 6mg q8hrs x24hrs  -DVT ppx - HOLD ALL CHEMICAL DVT PPX  -Continue ancef while drain is in  -FU AM Labs  -Rest, ice, compress   -Incentive Spirometry  -Maintain patton, will obtain urology consult to manage patton and monitor restoration of bladder function sp cauda equina  -Medical management appreciated

## 2022-07-15 NOTE — PHYSICAL THERAPY INITIAL EVALUATION ADULT - BED MOBILITY LIMITATIONS, REHAB EVAL
impaired ability to control trunk for mobility
decreased ability to use arms for pushing/pulling/decreased ability to use legs for bridging/pushing/impaired ability to control trunk for mobility

## 2022-07-15 NOTE — OCCUPATIONAL THERAPY INITIAL EVALUATION ADULT - ADL RETRAINING, OT EVAL
Pt will perform toileting tasks with adaptive equipment independently in 2 weeks
Patient will perform upper/lower body dressing independently within 3 weeks

## 2022-07-15 NOTE — PROGRESS NOTE ADULT - SUBJECTIVE AND OBJECTIVE BOX
Patient is a 66y old  Female who presents with a chief complaint of Left leg/thigh pain. (15 Jul 2022 06:10)      INTERVAL HPI/ OVERNIGHT EVENTS: Pt was seen and examined at bedside today, L4-5 decompression, TLIF POD 1,  pt continues to have severe back pain with radiation to bilateral feet, she denies any other acute complaints.      MEDICATIONS  (STANDING):  atorvastatin Oral Tab/Cap - Peds 10 milliGRAM(s) Oral daily  ceFAZolin   IVPB 2000 milliGRAM(s) IV Intermittent every 8 hours  cyclobenzaprine 10 milliGRAM(s) Oral every 8 hours  dextrose 5%. 1000 milliLiter(s) (50 mL/Hr) IV Continuous <Continuous>  dextrose 5%. 1000 milliLiter(s) (100 mL/Hr) IV Continuous <Continuous>  dextrose 5%. 1000 milliLiter(s) (50 mL/Hr) IV Continuous <Continuous>  dextrose 50% Injectable 25 Gram(s) IV Push once  dextrose 50% Injectable 25 Gram(s) IV Push once  dextrose 50% Injectable 12.5 Gram(s) IV Push once  dextrose 50% Injectable 25 Gram(s) IV Push once  ferrous    sulfate 325 milliGRAM(s) Oral two times a day  glucagon  Injectable 1 milliGRAM(s) IntraMuscular once  glucagon  Injectable 1 milliGRAM(s) IntraMuscular once  insulin lispro (ADMELOG) corrective regimen sliding scale   SubCutaneous three times a day before meals  insulin lispro (ADMELOG) corrective regimen sliding scale   SubCutaneous at bedtime  lactated ringers. 1000 milliLiter(s) (75 mL/Hr) IV Continuous <Continuous>  metoprolol succinate ER 25 milliGRAM(s) Oral daily  montelukast 10 milliGRAM(s) Oral daily  naloxone Injectable 0.4 milliGRAM(s) IV Push once  pantoprazole    Tablet 40 milliGRAM(s) Oral before breakfast  polyethylene glycol 3350 17 Gram(s) Oral daily  senna 2 Tablet(s) Oral at bedtime    MEDICATIONS  (PRN):  acetaminophen     Tablet .. 650 milliGRAM(s) Oral every 6 hours PRN Temp greater or equal to 38C (100.4F), Mild Pain (1 - 3)  bisacodyl 5 milliGRAM(s) Oral every 12 hours PRN Constipation  dextrose Oral Gel 15 Gram(s) Oral once PRN Blood Glucose LESS THAN 70 milliGRAM(s)/deciliter  dextrose Oral Gel 15 Gram(s) Oral once PRN Blood Glucose LESS THAN 70 milliGRAM(s)/deciliter  HYDROmorphone  Injectable 1 milliGRAM(s) IV Push every 4 hours PRN Severe Pain (7 - 10)  magnesium hydroxide Suspension 30 milliLiter(s) Oral every 12 hours PRN Constipation  melatonin 3 milliGRAM(s) Oral at bedtime PRN Insomnia  ondansetron Injectable 4 milliGRAM(s) IV Push every 6 hours PRN Nausea and/or Vomiting  oxycodone    5 mG/acetaminophen 325 mG 2 Tablet(s) Oral every 4 hours PRN Moderate Pain (4 - 6)      Allergies    No Known Allergies    Intolerances        REVIEW OF SYSTEMS:    Unable to examine due to [ ] Encephalopathy [ ] Advanced Dementia [ ] Expressive Aphasia [ ] Non-verbal patient    CONSTITUTIONAL: No fever, NO generalized weakness/Fatigue, No weight loss  EYES: No eye pain, visual disturbances, or discharge  ENMT:  No difficulty hearing, tinnitus, vertigo; No sinus or throat pain  NECK: No pain or stiffness  RESPIRATORY: No shortness of breath,  cough, wheezing, sputum or hemoptysis   CARDIOVASCULAR: No chest pain, palpitations, or leg swelling  GASTROINTESTINAL:No abdominal pain. No nausea, vomiting, diarrhea or constipation. No melena or hematochezia.  NEUROLOGICAL: No headaches, Dizziness, memory loss, loss of strength, or tremors  SKIN: No itching, burning, rashes, or lesions   MUSCULOSKELETAL: lower back pain radiating to b/l feet  PSYCHIATRIC: No depression, anxiety, mood swings, or difficulty sleeping  HEME/LYMPH: No easy bruising, or bleeding gums      Vital Signs Last 24 Hrs  T(C): 37 (15 Jul 2022 17:02), Max: 37.3 (15 Jul 2022 12:14)  T(F): 98.6 (15 Jul 2022 17:02), Max: 99.1 (15 Jul 2022 12:14)  HR: 78 (15 Jul 2022 17:02) (77 - 92)  BP: 134/69 (15 Jul 2022 17:02) (95/59 - 171/74)  BP(mean): --  RR: 18 (15 Jul 2022 17:02) (16 - 21)  SpO2: 98% (15 Jul 2022 17:02) (94% - 100%)    Parameters below as of 15 Jul 2022 17:02  Patient On (Oxygen Delivery Method): room air        PHYSICAL EXAM:  GENERAL: Pt appears to be uncomfortable in pain,  well-developed, well-groomed  HEAD:  Atraumatic, Normocephalic  EYES: conjunctiva and sclera clear  ENMT: Moist mucous membranes  NECK: Supple, No JVD, Normal thyroid  CHEST/LUNG: Clear to Auscultation bilaterally; No rales, rhonchi, wheezing, or rubs  HEART: Regular rate and rhythm; No murmurs, rubs, or gallops  ABDOMEN: Soft, Nontender, Nondistended; Bowel sounds present  EXTREMITIES:  +lumbar tenderness w/ radiation to b/l feet, 2+ Peripheral Pulses, No clubbing, cyanosis, or edema  SKIN: No rashes or lesions  NERVOUS SYSTEM:  Alert & Oriented X3, Good concentration; Motor Strength 5/5 B/L upper extremities, Lower extremity strength unable to be assess due to severe pain     LABS:                        11.7   15.31 )-----------( 300      ( 15 Jul 2022 00:10 )             36.5     07-15    138  |  106  |  27<H>  ----------------------------<  270<H>  5.0   |  20<L>  |  1.56<H>    Ca    8.4<L>      15 Jul 2022 00:10      PT/INR - ( 14 Jul 2022 16:42 )   PT: 11.2 sec;   INR: 0.94 ratio         PTT - ( 14 Jul 2022 16:42 )  PTT:32.1 sec    CAPILLARY BLOOD GLUCOSE      POCT Blood Glucose.: 445 mg/dL (15 Jul 2022 15:52)  POCT Blood Glucose.: 456 mg/dL (15 Jul 2022 15:51)  POCT Blood Glucose.: 457 mg/dL (15 Jul 2022 11:25)  POCT Blood Glucose.: 257 mg/dL (15 Jul 2022 07:33)  POCT Blood Glucose.: 252 mg/dL (15 Jul 2022 00:22)        Culture - Urine (collected 07-12-22)  Source: Clean Catch Clean Catch (Midstream)  Final Report (07-14-22):    <10,000 CFU/mL Normal Urogenital Mireille        RADIOLOGY & ADDITIONAL TESTS:          Imaging Personally Reviewed:  [ ] YES  [ ] NO    Consultant(s) Notes Reviewed:  [ ] YES  [ ] NO    Care Discussed with Consultants/Other Providers [x ] YES  [ ] NO

## 2022-07-15 NOTE — OCCUPATIONAL THERAPY INITIAL EVALUATION ADULT - BED MOBILITY LIMITATIONS, REHAB EVAL
decreased ability to use legs for bridging/pushing/impaired ability to control trunk for mobility
2/2 increase pain/decreased ability to use legs for bridging/pushing

## 2022-07-15 NOTE — PROGRESS NOTE ADULT - SUBJECTIVE AND OBJECTIVE BOX
Postop Check    Patient tolerated the procedure well. Patient seen and examined at bedside. Patient is in acute postop back pain radiating into both legs. Denies weakness, numbness or tingling. Denies chest pain, shortness of breath, nausea or vomiting.     PE:  Vital Signs Last 24 Hrs  T(C): 36.5 (07-15-22 @ 00:41), Max: 37 (07-14-22 @ 16:10)  T(F): 97.7 (07-15-22 @ 00:41), Max: 98.6 (07-14-22 @ 16:10)  HR: 87 (07-15-22 @ 00:41) (63 - 92)  BP: 138/65 (07-15-22 @ 00:41) (95/59 - 147/86)  BP(mean): --  RR: 18 (07-15-22 @ 00:41) (16 - 21)  SpO2: 99% (07-15-22 @ 00:41) (94% - 100%)    PE  General: awake, alert, acutely distressed     Dressing C/D/I  Drain present   2+ radial pulses  2+ DP Pulses    Motor:                   C5                C6              C7               C8           T1   R             5/5                5/5            5/5              5/5          5/5  L             5/5                5/5            5/5              5/5          5/5                    L2                  L3             L4              L5            S1  R            5/5                5/5             5/5            5/5          5/5  L             5/5                5/5            5/5            5/5          5/5    Sensory:            C5         C6         C7      C8       T1        (0=absent, 1=impaired, 2=normal, NT=not testable)  R         2            2           2        2         2  L          2            2           2        2         2               L2          L3         L4      L5       S1         (0=absent, 1=impaired, 2=normal, NT=not testable)  R         2            2            2        2        2  L          2            2           2        2         2                              13.1   12.08 )-----------( 315      ( 14 Jul 2022 16:42 )             37.6     14 Jul 2022 16:42    139    |  106    |  25     ----------------------------<  221    4.7     |  24     |  1.31     Ca    8.9        14 Jul 2022 16:42      PT/INR - ( 14 Jul 2022 16:42 )   PT: 11.2 sec;   INR: 0.94 ratio         PTT - ( 14 Jul 2022 16:42 )  PTT:32.1 sec    A/P:  66y f s/p L4-5 decompression, TLIF POD 0, Stable  -PT/OT -WBAT  -lumosacral orthosis brace to be worn with ambulation/PT, will order from orthotist  -Continue ANCEF 2g while drains are in  -Record DALLIN output  -Pain Control  -IV Decadron 6mg q8hrs x24hrs  -DVT ppx - HOLD ALL CHEMICAL DVT PPX  -Continue ancef while drain is in  -FU AM Labs  -Rest, ice, compress   -Incentive Spirometry  -Maintain patton, will obtain urology consult to manage patton and monitor restoration of bladder function sp cauda equina  -Medical management appreciated

## 2022-07-15 NOTE — PHYSICAL THERAPY INITIAL EVALUATION ADULT - GAIT DEVIATIONS NOTED, PT EVAL
decreased belkys/decreased step length/decreased stride length/decreased weight-shifting ability
increased time in double stance/decreased step length

## 2022-07-15 NOTE — PHYSICAL THERAPY INITIAL EVALUATION ADULT - GAIT TRAINING, PT EVAL
Pt will be able to ambulate independently using assistive device up to 200 ft or more, be able to negotiate steps safely observing proper gait, posture and prevent falls.
Independent in ambulation with use of RW device up to 100 feet observing proper gait pattern, posture and use of walking device safely.

## 2022-07-15 NOTE — OCCUPATIONAL THERAPY INITIAL EVALUATION ADULT - PERTINENT HX OF CURRENT PROBLEM, REHAB EVAL
Patient is 66F [place] speaking PMHx of dm-2, htn hld presenting with left hip pain x 1-2 days. Described as mild, achy, shooting pain down left buttocks to knee. Exacerbated w/ standing and position.
Patient is 66F Romanian speaking PMHx of dm-2, htn hld presenting with left hip pain x 1-2 days. Described as mild, achy, shooting pain down left buttocks to knee. Exacerbated w/ standing and position.  CT lumbar showing multiple disc bulging in L2-S1. Pt is seen for re-evaluation and is now s/p L4-L5 decompression & TLIF.

## 2022-07-15 NOTE — OCCUPATIONAL THERAPY INITIAL EVALUATION ADULT - TRANSFER SAFETY CONCERNS NOTED: SIT/STAND, REHAB EVAL
decreased step length/decreased weight-shifting ability
2/2 increase pain/decreased weight-shifting ability

## 2022-07-15 NOTE — PHYSICAL THERAPY INITIAL EVALUATION ADULT - TRANSFER TRAINING, PT EVAL
Independent in  transfer ability bed to chair and vice versa using appropriate assistive device  and prevent falls.
Independent in  transfer ability bed to chair and vice versa using appropriate assistive device  and prevent falls.

## 2022-07-15 NOTE — PHYSICAL THERAPY INITIAL EVALUATION ADULT - IMPAIRMENTS CONTRIBUTING IMPAIRED BED MOBILITY, REHAB EVAL
impaired balance/impaired postural control/decreased strength
impaired balance/pain/impaired postural control/decreased strength

## 2022-07-15 NOTE — PHYSICAL THERAPY INITIAL EVALUATION ADULT - BALANCE TRAINING, PT EVAL
Independent sitting, transfers, standing and ambulation with good balance using appropriate assistive device and prevent falls.
Independent sitting, transfers, standing and ambulation with good balance using appropriate assistive device and prevent falls.

## 2022-07-15 NOTE — OCCUPATIONAL THERAPY INITIAL EVALUATION ADULT - RANGE OF MOTION EXAMINATION
trunk was WNL (within normal limits)/neck was WFL (within functional limits)
(spinal precautions)/trunk was WFL (within functional limits)

## 2022-07-15 NOTE — PHYSICAL THERAPY INITIAL EVALUATION ADULT - GENERAL OBSERVATIONS, REHAB EVAL
Pt found seated in long sit with HOB elevated +IV +Goss +B TEDS +DALLIN. Medicated during session with Morphine.
Pt found semi supine in bed in NAD, +hep lock, agreeable to PT Latonia and RN Leonora aware.

## 2022-07-15 NOTE — OCCUPATIONAL THERAPY INITIAL EVALUATION ADULT - ADDITIONAL COMMENTS
Patient report that she lives with her daughter in a private house with 3 LENKA and was independent with all self-care and mobility task without assistive device. Patient reported that daughter recently started providing some assistance with self-care due to increase pain.
As per patient and previous evaluation - Patient report that she lives with her daughter in a private house with 3 LENKA and was independent with all self-care and mobility task without assistive device. Patient reported that daughter recently started providing some assistance with self-care due to increase pain.

## 2022-07-15 NOTE — PHYSICAL THERAPY INITIAL EVALUATION ADULT - STRENGTHENING, PT EVAL
Patient will improve strength by 1 grade where limited to improve overall functional mobility including gait, transfers, bed mobility and decrease risk of falls.
Improve strength and general endurance to good and be able to perform functional task- bed mobility, transfers and ambulation at a safe level and prevent falls.

## 2022-07-15 NOTE — PHYSICAL THERAPY INITIAL EVALUATION ADULT - PLANNED THERAPY INTERVENTIONS, PT EVAL
balance training/bed mobility training/gait training/strengthening/transfer training
LTG 2 weeks/balance training/bed mobility training/gait training/strengthening/transfer training

## 2022-07-15 NOTE — PROGRESS NOTE ADULT - ASSESSMENT
66F Sami speaking PMHx of HTN, HLD, DM 2  presenting with left hip pain x 1-2 days. Described as mild, achy, shooting pain down left buttocks to knee. Exacerbated w/ standing and position. Pain level is 8/10 in intensity, not getting better after multiple doses of pain meds in ER. Otherwise, the patient denies any neck pain, headaches, chest pain, shortness of breath, n/v/d, abdominal pain, recent illness, fevers, chills, recent spinal/back surgeries or procedures, bowel or bladder incontinence, bowel or bladder retention, constipation, IV drug use, known cancer history, recent weight loss, trauma, weakness, other subjective neurological deficits, numbness/tingling, dysuria, hematuria, rashes. Ct Abd/Pelvis and CT Lumbar Spine :Mild motion artifact in the abdomen. No evidence of acute abdominal or pelvic abnormality. No evidence of acute lumbar spine abnormality. Mild degenerative changes of the lower lumbar spine. US negative for DVT.     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   -CT Lumbar Spine: L3-4: Disc bulge slightly asymmetric to the left. Mild bilateral facet   arthropathy. Mild spinal canal stenosis and minimal left neural foraminal   narrowing.  L4-5: Moderate disc bulge, mild bilateral facet arthropathy and   thickening of ligamenta flava. Moderate to severe spinal canal stenosis.   Mild to moderate left greater than right neural foraminal narrowing.  L5-S1: Mild disc bulge. Moderate bilateral facet arthropathy.   Mild/moderate spinal canal stenosis. Mass effect on the traversing   bilateral S1 nerve roots. Mild bilateral neural foraminal narrowing.  Orthopedic on board   7/14 L4-5 decompression, TLIF POD 1  - IV abx, Peyman drain, lumosacral orthosis brace,  Decadron started   -cont w/ analgesics prn   -MAIA on discharge     DM II   HgA1c: 8.4% uncontrolled   hold standing insulin for now while NPO  cont w/ FS monitoring w/ insulin s/s coverage for now     Stage 3 chronic kidney disease.   avoid nephrotoxic medications, renal dose meds   cont to monitor renal function as needed     Hypertension   cont w/ Metoprolol.    Dyslipidemia.   cont w/ Atorvastatin.    DVTp: heparin   Disposition: MAIA     Sylheti speaking; language line was used.   Plan discussed with Rob Winter [phone]

## 2022-07-15 NOTE — PHYSICAL THERAPY INITIAL EVALUATION ADULT - BED MOBILITY TRAINING, PT EVAL
Independent in bed mobility- supine<>sit, rolling side<>side observing proper body mechanics, proper positioning, body alignment and precautions.
Independent in bed mobility- supine<>sit, rolling side<>side observing proper body mechanics, proper positioning, body alignment and precautions.

## 2022-07-15 NOTE — PHYSICAL THERAPY INITIAL EVALUATION ADULT - TRANSFER SAFETY CONCERNS NOTED: SIT/STAND, REHAB EVAL
decreased weight-shifting ability
losing balance/decreased proprioception/decreased sequencing ability

## 2022-07-15 NOTE — OCCUPATIONAL THERAPY INITIAL EVALUATION ADULT - GENERAL OBSERVATIONS, REHAB EVAL
Pt was encountered supine in bed; NAD, DALLIN drain intact, AXOX4, pt c/o pain s/p L4-L5 decompression & TLIF. PT Osman present. Language Line Ishan #[id number] provided Albanian translation. LSO donned at EOB. Pt educated on spinal precautions, use of LSO & compensatory techniques.
Chart reviewed and evnet noted to dtae. Patietn encountered supine in bed, NAD, Sami speaking (laungua line  Navimarineemma # [id number]), A&Ox3. PT Roshen present. Patient c/o pain in left leg and lower back 5/10 at rest and 7/10 w/movement. CT lumbar showing multiple disc bulging in L2-S1.

## 2022-07-15 NOTE — OCCUPATIONAL THERAPY INITIAL EVALUATION ADULT - BALANCE TRAINING, PT EVAL
Pt will increase dynamic standing balance to fair+ to increase performance with ADLs in 2 weeks
Patient will increase static/dynamic standing balance to good in order to perform functional transfers independently within 4 weeks

## 2022-07-15 NOTE — PHYSICAL THERAPY INITIAL EVALUATION ADULT - PERTINENT HX OF CURRENT PROBLEM, REHAB EVAL
66F Georgian speaking PMHx of dm-2, htn hld presenting with left hip pain x 1-2 days.
66F Swedish speaking PMHx of dm-2, htn hld presenting with left hip pain x 1-2 days. Described as mild, achy, shooting pain down left buttocks to knee.

## 2022-07-15 NOTE — OCCUPATIONAL THERAPY INITIAL EVALUATION ADULT - DIAGNOSIS, OT EVAL
M62.81 generalized weakness, decreased ADL management and functional transfers/mobility
M62.81 generalized weakness, decreased ADL management and functional transfers/mobility.

## 2022-07-15 NOTE — PHYSICAL THERAPY INITIAL EVALUATION ADULT - ADDITIONAL COMMENTS
Oc  used ID Ishan #[id number]. Chart Review: Pt states she lives with her daughter in a  with 3 LENKA +HRs, and resides on main level. Prior to onset of pain, pt is typically independent with functional mobility without a device, however daughter assists pt as needed.
Oc  used ID #[id number]. Pt states she lives with her daughter in a  with 3 LENKA +HRs, and resides on main level. Prior to onset of pain, pt is typically independent with functional mobility without a device, however daughter assists pt as needed.

## 2022-07-16 LAB
ANION GAP SERPL CALC-SCNC: 11 MMOL/L — SIGNIFICANT CHANGE UP (ref 5–17)
BASOPHILS # BLD AUTO: 0.02 K/UL — SIGNIFICANT CHANGE UP (ref 0–0.2)
BASOPHILS NFR BLD AUTO: 0.1 % — SIGNIFICANT CHANGE UP (ref 0–2)
BUN SERPL-MCNC: 26 MG/DL — HIGH (ref 7–23)
CALCIUM SERPL-MCNC: 9.3 MG/DL — SIGNIFICANT CHANGE UP (ref 8.5–10.1)
CHLORIDE SERPL-SCNC: 100 MMOL/L — SIGNIFICANT CHANGE UP (ref 96–108)
CO2 SERPL-SCNC: 23 MMOL/L — SIGNIFICANT CHANGE UP (ref 22–31)
CREAT SERPL-MCNC: 1.52 MG/DL — HIGH (ref 0.5–1.3)
EGFR: 38 ML/MIN/1.73M2 — LOW
EOSINOPHIL # BLD AUTO: 0.01 K/UL — SIGNIFICANT CHANGE UP (ref 0–0.5)
EOSINOPHIL NFR BLD AUTO: 0.1 % — SIGNIFICANT CHANGE UP (ref 0–6)
GLUCOSE BLDC GLUCOMTR-MCNC: 238 MG/DL — HIGH (ref 70–99)
GLUCOSE BLDC GLUCOMTR-MCNC: 254 MG/DL — HIGH (ref 70–99)
GLUCOSE BLDC GLUCOMTR-MCNC: 361 MG/DL — HIGH (ref 70–99)
GLUCOSE BLDC GLUCOMTR-MCNC: 362 MG/DL — HIGH (ref 70–99)
GLUCOSE BLDC GLUCOMTR-MCNC: 381 MG/DL — HIGH (ref 70–99)
GLUCOSE BLDC GLUCOMTR-MCNC: 477 MG/DL — CRITICAL HIGH (ref 70–99)
GLUCOSE BLDC GLUCOMTR-MCNC: 514 MG/DL — CRITICAL HIGH (ref 70–99)
GLUCOSE SERPL-MCNC: 352 MG/DL — HIGH (ref 70–99)
HCT VFR BLD CALC: 33.5 % — LOW (ref 34.5–45)
HGB BLD-MCNC: 11.4 G/DL — LOW (ref 11.5–15.5)
IMM GRANULOCYTES NFR BLD AUTO: 0.6 % — SIGNIFICANT CHANGE UP (ref 0–1.5)
LYMPHOCYTES # BLD AUTO: 14.1 % — SIGNIFICANT CHANGE UP (ref 13–44)
LYMPHOCYTES # BLD AUTO: 2.22 K/UL — SIGNIFICANT CHANGE UP (ref 1–3.3)
MCHC RBC-ENTMCNC: 29.2 PG — SIGNIFICANT CHANGE UP (ref 27–34)
MCHC RBC-ENTMCNC: 34 G/DL — SIGNIFICANT CHANGE UP (ref 32–36)
MCV RBC AUTO: 85.7 FL — SIGNIFICANT CHANGE UP (ref 80–100)
MONOCYTES # BLD AUTO: 1.16 K/UL — HIGH (ref 0–0.9)
MONOCYTES NFR BLD AUTO: 7.3 % — SIGNIFICANT CHANGE UP (ref 2–14)
NEUTROPHILS # BLD AUTO: 12.29 K/UL — HIGH (ref 1.8–7.4)
NEUTROPHILS NFR BLD AUTO: 77.8 % — HIGH (ref 43–77)
NRBC # BLD: 0 /100 WBCS — SIGNIFICANT CHANGE UP (ref 0–0)
PLATELET # BLD AUTO: 267 K/UL — SIGNIFICANT CHANGE UP (ref 150–400)
POTASSIUM SERPL-MCNC: 4.5 MMOL/L — SIGNIFICANT CHANGE UP (ref 3.5–5.3)
POTASSIUM SERPL-SCNC: 4.5 MMOL/L — SIGNIFICANT CHANGE UP (ref 3.5–5.3)
RBC # BLD: 3.91 M/UL — SIGNIFICANT CHANGE UP (ref 3.8–5.2)
RBC # FLD: 13 % — SIGNIFICANT CHANGE UP (ref 10.3–14.5)
SODIUM SERPL-SCNC: 134 MMOL/L — LOW (ref 135–145)
WBC # BLD: 15.79 K/UL — HIGH (ref 3.8–10.5)
WBC # FLD AUTO: 15.79 K/UL — HIGH (ref 3.8–10.5)

## 2022-07-16 PROCEDURE — 99232 SBSQ HOSP IP/OBS MODERATE 35: CPT

## 2022-07-16 RX ORDER — MORPHINE SULFATE 50 MG/1
15 CAPSULE, EXTENDED RELEASE ORAL
Refills: 0 | Status: DISCONTINUED | OUTPATIENT
Start: 2022-07-16 | End: 2022-07-18

## 2022-07-16 RX ORDER — INSULIN LISPRO 100/ML
5 VIAL (ML) SUBCUTANEOUS
Refills: 0 | Status: DISCONTINUED | OUTPATIENT
Start: 2022-07-16 | End: 2022-07-17

## 2022-07-16 RX ORDER — INSULIN GLARGINE 100 [IU]/ML
15 INJECTION, SOLUTION SUBCUTANEOUS AT BEDTIME
Refills: 0 | Status: DISCONTINUED | OUTPATIENT
Start: 2022-07-16 | End: 2022-07-17

## 2022-07-16 RX ADMIN — Medication 10: at 11:48

## 2022-07-16 RX ADMIN — Medication 25 MILLIGRAM(S): at 05:02

## 2022-07-16 RX ADMIN — HYDROMORPHONE HYDROCHLORIDE 1 MILLIGRAM(S): 2 INJECTION INTRAMUSCULAR; INTRAVENOUS; SUBCUTANEOUS at 15:39

## 2022-07-16 RX ADMIN — CYCLOBENZAPRINE HYDROCHLORIDE 10 MILLIGRAM(S): 10 TABLET, FILM COATED ORAL at 05:02

## 2022-07-16 RX ADMIN — SENNA PLUS 2 TABLET(S): 8.6 TABLET ORAL at 22:18

## 2022-07-16 RX ADMIN — Medication 5 UNIT(S): at 11:49

## 2022-07-16 RX ADMIN — HYDROMORPHONE HYDROCHLORIDE 1 MILLIGRAM(S): 2 INJECTION INTRAMUSCULAR; INTRAVENOUS; SUBCUTANEOUS at 20:10

## 2022-07-16 RX ADMIN — MORPHINE SULFATE 15 MILLIGRAM(S): 50 CAPSULE, EXTENDED RELEASE ORAL at 18:26

## 2022-07-16 RX ADMIN — HYDROMORPHONE HYDROCHLORIDE 1 MILLIGRAM(S): 2 INJECTION INTRAMUSCULAR; INTRAVENOUS; SUBCUTANEOUS at 19:52

## 2022-07-16 RX ADMIN — MONTELUKAST 10 MILLIGRAM(S): 4 TABLET, CHEWABLE ORAL at 11:47

## 2022-07-16 RX ADMIN — INSULIN GLARGINE 15 UNIT(S): 100 INJECTION, SOLUTION SUBCUTANEOUS at 21:53

## 2022-07-16 RX ADMIN — MORPHINE SULFATE 15 MILLIGRAM(S): 50 CAPSULE, EXTENDED RELEASE ORAL at 11:09

## 2022-07-16 RX ADMIN — Medication 10: at 07:51

## 2022-07-16 RX ADMIN — CYCLOBENZAPRINE HYDROCHLORIDE 10 MILLIGRAM(S): 10 TABLET, FILM COATED ORAL at 22:08

## 2022-07-16 RX ADMIN — Medication 100 MILLIGRAM(S): at 04:47

## 2022-07-16 RX ADMIN — Medication 4: at 16:19

## 2022-07-16 RX ADMIN — PANTOPRAZOLE SODIUM 40 MILLIGRAM(S): 20 TABLET, DELAYED RELEASE ORAL at 07:51

## 2022-07-16 RX ADMIN — Medication 325 MILLIGRAM(S): at 05:02

## 2022-07-16 RX ADMIN — ATORVASTATIN CALCIUM 10 MILLIGRAM(S): 80 TABLET, FILM COATED ORAL at 11:47

## 2022-07-16 RX ADMIN — POLYETHYLENE GLYCOL 3350 17 GRAM(S): 17 POWDER, FOR SOLUTION ORAL at 11:47

## 2022-07-16 RX ADMIN — Medication 5 UNIT(S): at 10:07

## 2022-07-16 RX ADMIN — HYDROMORPHONE HYDROCHLORIDE 1 MILLIGRAM(S): 2 INJECTION INTRAMUSCULAR; INTRAVENOUS; SUBCUTANEOUS at 14:59

## 2022-07-16 RX ADMIN — MORPHINE SULFATE 15 MILLIGRAM(S): 50 CAPSULE, EXTENDED RELEASE ORAL at 17:26

## 2022-07-16 RX ADMIN — MORPHINE SULFATE 15 MILLIGRAM(S): 50 CAPSULE, EXTENDED RELEASE ORAL at 10:09

## 2022-07-16 RX ADMIN — Medication 650 MILLIGRAM(S): at 20:18

## 2022-07-16 RX ADMIN — CYCLOBENZAPRINE HYDROCHLORIDE 10 MILLIGRAM(S): 10 TABLET, FILM COATED ORAL at 13:33

## 2022-07-16 RX ADMIN — Medication 325 MILLIGRAM(S): at 17:27

## 2022-07-16 RX ADMIN — Medication 650 MILLIGRAM(S): at 21:18

## 2022-07-16 RX ADMIN — Medication 5 UNIT(S): at 16:19

## 2022-07-16 RX ADMIN — Medication 1: at 21:53

## 2022-07-16 NOTE — PROGRESS NOTE ADULT - SUBJECTIVE AND OBJECTIVE BOX
Patient is a 66y old  Female who presents with a chief complaint of Left leg/thigh pain. (16 Jul 2022 07:44)      INTERVAL HPI/ OVERNIGHT EVENTS: Pt was seen and examined at bedside today, No significant overnight events, pt continues to have severe back pain, denies CP or SOB      MEDICATIONS  (STANDING):  atorvastatin Oral Tab/Cap - Peds 10 milliGRAM(s) Oral daily  cyclobenzaprine 10 milliGRAM(s) Oral every 8 hours  dextrose 5%. 1000 milliLiter(s) (50 mL/Hr) IV Continuous <Continuous>  dextrose 5%. 1000 milliLiter(s) (100 mL/Hr) IV Continuous <Continuous>  dextrose 5%. 1000 milliLiter(s) (50 mL/Hr) IV Continuous <Continuous>  dextrose 50% Injectable 25 Gram(s) IV Push once  dextrose 50% Injectable 25 Gram(s) IV Push once  dextrose 50% Injectable 12.5 Gram(s) IV Push once  dextrose 50% Injectable 25 Gram(s) IV Push once  ferrous    sulfate 325 milliGRAM(s) Oral two times a day  glucagon  Injectable 1 milliGRAM(s) IntraMuscular once  glucagon  Injectable 1 milliGRAM(s) IntraMuscular once  insulin glargine Injectable (LANTUS) 15 Unit(s) SubCutaneous at bedtime  insulin lispro (ADMELOG) corrective regimen sliding scale   SubCutaneous three times a day before meals  insulin lispro (ADMELOG) corrective regimen sliding scale   SubCutaneous at bedtime  insulin lispro Injectable (ADMELOG) 5 Unit(s) SubCutaneous before breakfast  insulin lispro Injectable (ADMELOG) 5 Unit(s) SubCutaneous before lunch  insulin lispro Injectable (ADMELOG) 5 Unit(s) SubCutaneous before dinner  lactated ringers. 1000 milliLiter(s) (75 mL/Hr) IV Continuous <Continuous>  metoprolol succinate ER 25 milliGRAM(s) Oral daily  montelukast 10 milliGRAM(s) Oral daily  morphine ER Tablet 15 milliGRAM(s) Oral two times a day  naloxone Injectable 0.4 milliGRAM(s) IV Push once  pantoprazole    Tablet 40 milliGRAM(s) Oral before breakfast  polyethylene glycol 3350 17 Gram(s) Oral daily  senna 2 Tablet(s) Oral at bedtime    MEDICATIONS  (PRN):  acetaminophen     Tablet .. 650 milliGRAM(s) Oral every 6 hours PRN Temp greater or equal to 38C (100.4F), Mild Pain (1 - 3)  bisacodyl 5 milliGRAM(s) Oral every 12 hours PRN Constipation  dextrose Oral Gel 15 Gram(s) Oral once PRN Blood Glucose LESS THAN 70 milliGRAM(s)/deciliter  dextrose Oral Gel 15 Gram(s) Oral once PRN Blood Glucose LESS THAN 70 milliGRAM(s)/deciliter  HYDROmorphone  Injectable 1 milliGRAM(s) IV Push every 4 hours PRN Severe Pain (7 - 10)  magnesium hydroxide Suspension 30 milliLiter(s) Oral every 12 hours PRN Constipation  melatonin 3 milliGRAM(s) Oral at bedtime PRN Insomnia  ondansetron Injectable 4 milliGRAM(s) IV Push every 6 hours PRN Nausea and/or Vomiting  oxycodone    5 mG/acetaminophen 325 mG 2 Tablet(s) Oral every 4 hours PRN Moderate Pain (4 - 6)      Allergies    No Known Allergies    Intolerances        REVIEW OF SYSTEMS:    Unable to examine due to [ ] Encephalopathy [ ] Advanced Dementia [ ] Expressive Aphasia [ ] Non-verbal patient    CONSTITUTIONAL: No fever, NO generalized weakness/Fatigue, No weight loss  EYES: No eye pain, visual disturbances, or discharge  ENMT:  No difficulty hearing, tinnitus, vertigo; No sinus or throat pain  NECK: No pain or stiffness  RESPIRATORY: No shortness of breath,  cough, wheezing, sputum or hemoptysis   CARDIOVASCULAR: No chest pain, palpitations, or leg swelling  GASTROINTESTINAL:No abdominal pain. No nausea, vomiting, diarrhea or constipation. No melena or hematochezia.  NEUROLOGICAL: No headaches, Dizziness, memory loss, loss of strength, or tremors  SKIN: No itching, burning, rashes, or lesions   MUSCULOSKELETAL: lower back pain radiating to b/l feet  PSYCHIATRIC: No depression, anxiety, mood swings, or difficulty sleeping  HEME/LYMPH: No easy bruising, or bleeding gums      Vital Signs Last 24 Hrs  T(C): 36.7 (16 Jul 2022 12:27), Max: 37 (15 Jul 2022 17:02)  T(F): 98 (16 Jul 2022 12:27), Max: 98.6 (15 Jul 2022 17:02)  HR: 88 (16 Jul 2022 12:27) (72 - 88)  BP: 138/81 (16 Jul 2022 12:27) (124/64 - 159/75)  BP(mean): --  RR: 18 (16 Jul 2022 12:27) (16 - 18)  SpO2: 96% (16 Jul 2022 12:27) (92% - 98%)    Parameters below as of 16 Jul 2022 05:25  Patient On (Oxygen Delivery Method): room air        PHYSICAL EXAM:  GENERAL: Pt appears to be uncomfortable in pain,  well-developed, well-groomed  HEAD:  Atraumatic, Normocephalic  EYES: conjunctiva and sclera clear  ENMT: Moist mucous membranes  NECK: Supple, No JVD, Normal thyroid  CHEST/LUNG: Clear to Auscultation bilaterally; No rales, rhonchi, wheezing, or rubs  HEART: Regular rate and rhythm; No murmurs, rubs, or gallops  ABDOMEN: Soft, Nontender, Nondistended; Bowel sounds present  EXTREMITIES:  +lumbar tenderness w/ radiation to b/l feet, 2+ Peripheral Pulses, No clubbing, cyanosis, or edema  SKIN: No rashes or lesions  NERVOUS SYSTEM:  Alert & Oriented X3, Good concentration; Motor Strength 5/5 B/L upper extremities, Lower extremity strength unable to be assess due to severe pain     LABS:                        11.4   15.79 )-----------( 267      ( 16 Jul 2022 06:05 )             33.5     07-16    134<L>  |  100  |  26<H>  ----------------------------<  352<H>  4.5   |  23  |  1.52<H>    Ca    9.3      16 Jul 2022 06:05      PT/INR - ( 14 Jul 2022 16:42 )   PT: 11.2 sec;   INR: 0.94 ratio         PTT - ( 14 Jul 2022 16:42 )  PTT:32.1 sec    CAPILLARY BLOOD GLUCOSE      POCT Blood Glucose.: 381 mg/dL (16 Jul 2022 11:24)  POCT Blood Glucose.: 477 mg/dL (16 Jul 2022 10:05)  POCT Blood Glucose.: 514 mg/dL (16 Jul 2022 10:04)  POCT Blood Glucose.: 362 mg/dL (16 Jul 2022 07:49)  POCT Blood Glucose.: 361 mg/dL (16 Jul 2022 01:08)  POCT Blood Glucose.: 535 mg/dL (15 Jul 2022 21:29)  POCT Blood Glucose.: 554 mg/dL (15 Jul 2022 21:26)  POCT Blood Glucose.: 445 mg/dL (15 Jul 2022 15:52)  POCT Blood Glucose.: 456 mg/dL (15 Jul 2022 15:51)        Culture - Urine (collected 07-12-22)  Source: Clean Catch Clean Catch (Midstream)  Final Report (07-14-22):    <10,000 CFU/mL Normal Urogenital Mireille        RADIOLOGY & ADDITIONAL TESTS:          Imaging Personally Reviewed:  [ ] YES  [ ] NO    Consultant(s) Notes Reviewed:  [ ] YES  [ ] NO    Care Discussed with Consultants/Other Providers [x ] YES  [ ] NO

## 2022-07-16 NOTE — PROGRESS NOTE ADULT - SUBJECTIVE AND OBJECTIVE BOX
Patient tolerated the procedure well. Patient seen and examined at bedside. Patient still complains of severe back pain radiating into both legs. Denies weakness, numbness or tingling. Denies chest pain, shortness of breath, nausea or vomiting.     LABS:                        11.4   15.79 )-----------( 267      ( 16 Jul 2022 06:05 )             33.5     07-16    134<L>  |  100  |  26<H>  ----------------------------<  352<H>  4.5   |  23  |  1.52<H>    Ca    9.3      16 Jul 2022 06:05      PT/INR - ( 14 Jul 2022 16:42 )   PT: 11.2 sec;   INR: 0.94 ratio         PTT - ( 14 Jul 2022 16:42 )  PTT:32.1 sec      VITAL SIGNS:  T(C): 36.6 (07-16-22 @ 05:25), Max: 37.3 (07-15-22 @ 12:14)  HR: 76 (07-16-22 @ 05:25) (72 - 90)  BP: 159/75 (07-16-22 @ 05:25) (124/64 - 171/74)  RR: 18 (07-16-22 @ 05:25) (16 - 18)  SpO2: 96% (07-16-22 @ 05:25) (92% - 100%)      PE  General: awake, alert     Spine:  Dressing C/D/I  Drain present   +TTP over Lspine  2+ radial pulses  2+ DP Pulses    Motor:                   C5                C6              C7               C8           T1   R             5/5                5/5            5/5              5/5          5/5  L             5/5                5/5            5/5              5/5          5/5                    L2                  L3             L4              L5            S1  R            5/5                5/5             5/5            5/5          5/5  L             5/5                5/5            5/5            5/5          5/5    Sensory:            C5         C6         C7      C8       T1        (0=absent, 1=impaired, 2=normal, NT=not testable)  R         2            2           2        2         2  L          2            2           2        2         2               L2          L3         L4      L5       S1         (0=absent, 1=impaired, 2=normal, NT=not testable)  R         2            2            2        2        2  L          2            2           2        2         2                     A/P:  66y f s/p L4-5 decompression, TLIF POD 2, Stable  -PT/OT   -WBAT  -lumbosacral orthosis brace to be worn with ambulation/PT, orthotist to deliver to bedside   -Continue ANCEF 2g while drains are in  -Record DALLIN output  -Pain Control  -IV Decadron 6mg q8hrs x24hrs  -DVT ppx - HOLD ALL CHEMICAL DVT PPX  -FU AM Labs  -Rest, ice, compress   -Incentive Spirometry  -Maintain patton, will obtain urology consult to manage patton and monitor restoration of bladder function sp cauda equina  -Medical management appreciated    ***INCOMPLETE*** Patient seen and examined at bedside. Patient still complains of back pain radiating into both legs (L>R). Denies weakness, numbness or tingling. Denies chest pain, shortness of breath, nausea or vomiting.     LABS:                        11.4   15.79 )-----------( 267      ( 16 Jul 2022 06:05 )             33.5     07-16    134<L>  |  100  |  26<H>  ----------------------------<  352<H>  4.5   |  23  |  1.52<H>    Ca    9.3      16 Jul 2022 06:05      PT/INR - ( 14 Jul 2022 16:42 )   PT: 11.2 sec;   INR: 0.94 ratio         PTT - ( 14 Jul 2022 16:42 )  PTT:32.1 sec      VITAL SIGNS:  T(C): 36.6 (07-16-22 @ 05:25), Max: 37.3 (07-15-22 @ 12:14)  HR: 76 (07-16-22 @ 05:25) (72 - 90)  BP: 159/75 (07-16-22 @ 05:25) (124/64 - 171/74)  RR: 18 (07-16-22 @ 05:25) (16 - 18)  SpO2: 96% (07-16-22 @ 05:25) (92% - 100%)      PE  General: awake, alert     Spine:  Dressing C/D/I  Drain present   +TTP over Lspine  2+ radial pulses  2+ DP Pulses    Motor:                   C5                C6              C7               C8           T1   R             5/5                5/5            5/5              5/5          5/5  L             5/5                5/5            5/5              5/5          5/5                    L2                  L3             L4              L5            S1  R            5/5                5/5             5/5            5/5          5/5  L             5/5                5/5            5/5            5/5          5/5    Sensory:            C5         C6         C7      C8       T1        (0=absent, 1=impaired, 2=normal, NT=not testable)  R         2            2           2        2         2  L          2            2           2        2         2               L2          L3         L4      L5       S1         (0=absent, 1=impaired, 2=normal, NT=not testable)  R         2            2            2        2        2  L          2            2           2        2         2                     A/P:  66y f s/p L4-5 decompression, TLIF POD 2, Stable  -PT/OT   -WBAT  -lumbosacral orthosis brace to be worn with ambulation/PT   -PVR: 0  -DALLNI 25/85  -discontinue drains and ancef  -discontinue steroids  -Pain Control  -DVT ppx - HOLD ALL CHEMICAL DVT PPX  -FU AM Labs  -Rest, ice, compress   -Incentive Spirometry  -Maintain patton, urology consulted and aware to manage patton and monitor restoration of bladder function sp cauda equina  -Medical management appreciated Patient seen and examined at bedside. Patient still complains of back pain radiating into both legs (L>R). Denies weakness, numbness or tingling. Denies chest pain, shortness of breath, nausea or vomiting. Patton was removed yesterday and post void residual was obtained after TOV with 0cc retained.     LABS:                        11.4   15.79 )-----------( 267      ( 16 Jul 2022 06:05 )             33.5     07-16    134<L>  |  100  |  26<H>  ----------------------------<  352<H>  4.5   |  23  |  1.52<H>    Ca    9.3      16 Jul 2022 06:05      PT/INR - ( 14 Jul 2022 16:42 )   PT: 11.2 sec;   INR: 0.94 ratio         PTT - ( 14 Jul 2022 16:42 )  PTT:32.1 sec      VITAL SIGNS:  T(C): 36.6 (07-16-22 @ 05:25), Max: 37.3 (07-15-22 @ 12:14)  HR: 76 (07-16-22 @ 05:25) (72 - 90)  BP: 159/75 (07-16-22 @ 05:25) (124/64 - 171/74)  RR: 18 (07-16-22 @ 05:25) (16 - 18)  SpO2: 96% (07-16-22 @ 05:25) (92% - 100%)      PE  General: awake, alert     Spine:  Dressing C/D/I  DALLIN Drain in place with SS output  +TTP over Lspine  2+ radial pulses  2+ DP Pulses    Motor:                   C5                C6              C7               C8           T1   R             5/5                5/5            5/5              5/5          5/5  L             5/5                5/5            5/5              5/5          5/5                    L2                  L3             L4              L5            S1  R            5/5                5/5             5/5            5/5          5/5  L             5/5                5/5            5/5            5/5          5/5    Sensory:            C5         C6         C7      C8       T1        (0=absent, 1=impaired, 2=normal, NT=not testable)  R         2            2           2        2         2  L          2            2           2        2         2               L2          L3         L4      L5       S1         (0=absent, 1=impaired, 2=normal, NT=not testable)  R         2            2            2        2        2  L          2            2           2        2         2                     A/P:  66y f s/p L4-5 decompression, TLIF POD 2, Stable    -Medical management appreciated  -PT/OT   -WBAT  -lumbosacral orthosis brace to be worn with ambulation/PT   -Postop PVR: 0  -DALLIN 25/85; Plan to pull today and discontinue Ancef  -s/p 24hrs of Decadron  -Pain Control PRN  -DVT ppx - HOLD ALL CHEMICAL DVT PPX s/p spine surgery  -FU AM Labs  -Incentive Spirometry  -Maintain patton, urology consulted and aware to manage patton and monitor restoration of bladder function sp cauda equina  -Discussed with attending Dr. Christopher who agrees with plan

## 2022-07-16 NOTE — CONSULT NOTE ADULT - SUBJECTIVE AND OBJECTIVE BOX
HPI: Unfortunate 67 yo Female with an acute history of cauda equina admitted s/p spinal decompression w/ pre-op changes in lower urinary tract symptoms. Urology consulted post-op for bladder management and the patient was found to have acute urinary retention with PVRs 200s->500s ml.  Patient endorses abdominal discomfort. ?constipation also.   Denies dysuria, fevers, flank pain, gross hematuria.

## 2022-07-16 NOTE — DIETITIAN INITIAL EVALUATION ADULT - PERTINENT LABORATORY DATA
07-16    134<L>  |  100  |  26<H>  ----------------------------<  352<H>  4.5   |  23  |  1.52<H>    Ca    9.3      16 Jul 2022 06:05    POCT Blood Glucose.: 381 mg/dL (07-16-22 @ 11:24)  A1C with Estimated Average Glucose Result: 8.4 % (07-14-22 @ 05:16)

## 2022-07-16 NOTE — PROGRESS NOTE ADULT - ASSESSMENT
66F Wol[place] speaking PMHx of HTN, HLD, DM 2  presenting with left hip pain x 1-2 days. Described as mild, achy, shooting pain down left buttocks to knee. Exacerbated w/ standing and position. Pain level is 8/10 in intensity, not getting better after multiple doses of pain meds in ER. Otherwise, the patient denies any neck pain, headaches, chest pain, shortness of breath, n/v/d, abdominal pain, recent illness, fevers, chills, recent spinal/back surgeries or procedures, bowel or bladder incontinence, bowel or bladder retention, constipation, IV drug use, known cancer history, recent weight loss, trauma, weakness, other subjective neurological deficits, numbness/tingling, dysuria, hematuria, rashes. Ct Abd/Pelvis and CT Lumbar Spine :Mild motion artifact in the abdomen. No evidence of acute abdominal or pelvic abnormality. No evidence of acute lumbar spine abnormality. Mild degenerative changes of the lower lumbar spine. US negative for DVT.     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   -CT Lumbar Spine: L3-4: Disc bulge slightly asymmetric to the left. Mild bilateral facet   arthropathy. Mild spinal canal stenosis and minimal left neural foraminal   narrowing.  L4-5: Moderate disc bulge, mild bilateral facet arthropathy and   thickening of ligamenta flava. Moderate to severe spinal canal stenosis.   Mild to moderate left greater than right neural foraminal narrowing.  L5-S1: Mild disc bulge. Moderate bilateral facet arthropathy.   Mild/moderate spinal canal stenosis. Mass effect on the traversing   bilateral S1 nerve roots. Mild bilateral neural foraminal narrowing.  Orthopedic on board   7/14 L4-5 decompression, TLIF POD 1  - IV abx, Peyman drain, lumosacral orthosis brace,  Decadron   - will add extended Morphine for better control, cont w/ analgesics prn   -MAIA on discharge     DM II   HgA1c: 8.4% uncontrolled   will start lantus and prandial insulin   cont w/ FS monitoring w/ insulin s/s coverage for now     Stage 3 chronic kidney disease.   avoid nephrotoxic medications, renal dose meds   cont to monitor renal function as needed     Hypertension   cont w/ Metoprolol.    Dyslipidemia.   cont w/ Atorvastatin.    DVTp: heparin   Disposition: MAIA     Harshil speaking; language line was used.   Son Enam [phone]   66F Indonesian speaking PMHx of HTN, HLD, DM 2  presenting with left hip pain x 1-2 days. Described as mild, achy, shooting pain down left buttocks to knee. Exacerbated w/ standing and position. Pain level is 8/10 in intensity, not getting better after multiple doses of pain meds in ER. Otherwise, the patient denies any neck pain, headaches, chest pain, shortness of breath, n/v/d, abdominal pain, recent illness, fevers, chills, recent spinal/back surgeries or procedures, bowel or bladder incontinence, bowel or bladder retention, constipation, IV drug use, known cancer history, recent weight loss, trauma, weakness, other subjective neurological deficits, numbness/tingling, dysuria, hematuria, rashes. Ct Abd/Pelvis and CT Lumbar Spine :Mild motion artifact in the abdomen. No evidence of acute abdominal or pelvic abnormality. No evidence of acute lumbar spine abnormality. Mild degenerative changes of the lower lumbar spine. US negative for DVT.     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   -CT Lumbar Spine: L3-4: Disc bulge slightly asymmetric to the left. Mild bilateral facet   arthropathy. Mild spinal canal stenosis and minimal left neural foraminal   narrowing.  L4-5: Moderate disc bulge, mild bilateral facet arthropathy and   thickening of ligamenta flava. Moderate to severe spinal canal stenosis.   Mild to moderate left greater than right neural foraminal narrowing.  L5-S1: Mild disc bulge. Moderate bilateral facet arthropathy.   Mild/moderate spinal canal stenosis. Mass effect on the traversing   bilateral S1 nerve roots. Mild bilateral neural foraminal narrowing.  Orthopedic on board   7/14 L4-5 decompression, TLIF POD 1  - IV abx, Peyman drain, lumosacral orthosis brace,  Decadron   - will add extended Morphine for better control, cont w/ analgesics prn   -MAIA on discharge     Neurogenic Bladder   secondary to Cauda Equina syndrome   Urology on board   TOV done today, pt had 215cc PVR  f/u urology recommendations.     Acute Metabolic encephalopathy  Possible opioid induced   will get Head CT   cont to monitor menation    DM II   HgA1c: 8.4% uncontrolled   cont w/ lantus and prandial insulin   cont w/ FS monitoring w/ insulin s/s coverage for now     Stage 3 chronic kidney disease.   avoid nephrotoxic medications, renal dose meds   cont to monitor renal function as needed     Hypertension   cont w/ Metoprolol.    Dyslipidemia.   cont w/ Atorvastatin.    DVTp: heparin   Disposition: MAIA Chua speaking; language line was used.   Plan discussed w/ Son Enhill [phone]

## 2022-07-16 NOTE — DIETITIAN INITIAL EVALUATION ADULT - OTHER INFO
Pt in visible pain during visit, unable to conduct interview. Spoke to pt's son ([phone]), who was able to provide information.  Pt lives at home with family; independent for ADLs at home.  Pt with good appetite and good po intake PTA; pt follows typical  diet per son (rice, galeana, beef, chicken, vegetables, bread, potatoes); does not consume sweets, juice or sodas. Discussed with son regarding recommended portion sizes for various CHO food that pt likes to eat, specifically rice, bread, and potatoes.  Pt with T2DM (A1c=8.4%); checks BG multiple times per day, has been running high recently; pt takes Metformin, Janumet, Humalog and Lantus to control BG at home. Pt's glucose elevated during admission; pt received steroid (Decadron), which contributed to elevated BG levels.  Pt's po intake % during admission; no reports of chewing/swallowing difficulty.  Son reports pt's ht 5'0", not 5'3"; son unsure of pt's UBW, however reports pt's wt appears stable, and no recent wt loss or wt gain noted.

## 2022-07-16 NOTE — DIETITIAN INITIAL EVALUATION ADULT - PHYSCIAL ASSESSMENT
Pt appeared well nourished with no physical signs of muscle wasting or fat depletion, except for mild orbital depletion Unable to conduct nutrition focused physical exam due to pt in visible pain, however pt appeared well nourished with no physical signs of muscle wasting or fat depletion, except for mild orbital depletion noted

## 2022-07-16 NOTE — DIETITIAN INITIAL EVALUATION ADULT - PERTINENT MEDS FT
MEDICATIONS  (STANDING):  atorvastatin Oral Tab/Cap - Peds 10 milliGRAM(s) Oral daily  cyclobenzaprine 10 milliGRAM(s) Oral every 8 hours  dextrose 5%. 1000 milliLiter(s) (50 mL/Hr) IV Continuous <Continuous>  dextrose 5%. 1000 milliLiter(s) (100 mL/Hr) IV Continuous <Continuous>  dextrose 5%. 1000 milliLiter(s) (50 mL/Hr) IV Continuous <Continuous>  dextrose 50% Injectable 25 Gram(s) IV Push once  dextrose 50% Injectable 25 Gram(s) IV Push once  dextrose 50% Injectable 12.5 Gram(s) IV Push once  dextrose 50% Injectable 25 Gram(s) IV Push once  ferrous    sulfate 325 milliGRAM(s) Oral two times a day  glucagon  Injectable 1 milliGRAM(s) IntraMuscular once  glucagon  Injectable 1 milliGRAM(s) IntraMuscular once  insulin glargine Injectable (LANTUS) 15 Unit(s) SubCutaneous at bedtime  insulin lispro (ADMELOG) corrective regimen sliding scale   SubCutaneous three times a day before meals  insulin lispro (ADMELOG) corrective regimen sliding scale   SubCutaneous at bedtime  insulin lispro Injectable (ADMELOG) 5 Unit(s) SubCutaneous before breakfast  insulin lispro Injectable (ADMELOG) 5 Unit(s) SubCutaneous before lunch  insulin lispro Injectable (ADMELOG) 5 Unit(s) SubCutaneous before dinner  lactated ringers. 1000 milliLiter(s) (75 mL/Hr) IV Continuous <Continuous>  metoprolol succinate ER 25 milliGRAM(s) Oral daily  montelukast 10 milliGRAM(s) Oral daily  morphine ER Tablet 15 milliGRAM(s) Oral two times a day  naloxone Injectable 0.4 milliGRAM(s) IV Push once  pantoprazole    Tablet 40 milliGRAM(s) Oral before breakfast  polyethylene glycol 3350 17 Gram(s) Oral daily  senna 2 Tablet(s) Oral at bedtime    MEDICATIONS  (PRN):  acetaminophen     Tablet .. 650 milliGRAM(s) Oral every 6 hours PRN Temp greater or equal to 38C (100.4F), Mild Pain (1 - 3)  bisacodyl 5 milliGRAM(s) Oral every 12 hours PRN Constipation  dextrose Oral Gel 15 Gram(s) Oral once PRN Blood Glucose LESS THAN 70 milliGRAM(s)/deciliter  dextrose Oral Gel 15 Gram(s) Oral once PRN Blood Glucose LESS THAN 70 milliGRAM(s)/deciliter  HYDROmorphone  Injectable 1 milliGRAM(s) IV Push every 4 hours PRN Severe Pain (7 - 10)  magnesium hydroxide Suspension 30 milliLiter(s) Oral every 12 hours PRN Constipation  melatonin 3 milliGRAM(s) Oral at bedtime PRN Insomnia  ondansetron Injectable 4 milliGRAM(s) IV Push every 6 hours PRN Nausea and/or Vomiting  oxycodone    5 mG/acetaminophen 325 mG 2 Tablet(s) Oral every 4 hours PRN Moderate Pain (4 - 6)

## 2022-07-16 NOTE — CONSULT NOTE ADULT - NEGATIVE GENERAL GENITOURINARY SYMPTOMS
Spinal Block    Date/Time: 12/4/2020 7:24 AM  Performed by: Yvon Boyce CRNA  Authorized by: Yvon Boyce CRNA     Patient Location:  OR  Indication: primary anesthetic    patient identified, IV checked, risks and benefits discussed, surgical consent, monitors and equipment checked, pre-op evaluation, timeout performed and IV bolus crystalloid    Start Time:  12/4/2020 7:21 AM   SPINAL BLOCK NOTE     Efrain Rider  [id number],    Procedure date: 12/4/2020    Location:  OR     Preanesthetic Checklist: Patient identified, risk and benefits and other alternatives discussed with pt and/or representative.  R/B of spinal anesthetic discussed including but not limited to post-dural puncture headache, back pain, neurologic complications, difficult placement, failed spinal, nausea/vomiting, and pruritis. Questions answered and agreement obtained.    Monitors and equipment checked, IV - Crytalloid infusing,  Timeout performed.  Patient reevaluated immediately prior to block and is hemodynamically stable and ready for procedure.    Monitoring: NIBP, Pulse ox, ECG    Position: Left lateral    Prep: Handwashing, ChloraPrep, Sterile drape, Sterile gloves, Mask and Hat    Interspace: L3-4    Local Anesthesia: 1% Lidocaine 3 cc    Approach: Paramedian    Spinal Needle: 22g 3.5 inch Q    Attempts: 1    Intrathecal Meds:  1.8 ml 0.75% spinal bupivacaine    Heme: No    Paresthesias: No    Comments: Free flow CSF, +asp, injected easily, +asp       Yvon Boyce CRNA         no hematuria/no renal colic/no flank pain L/no flank pain R/no bladder infections/no dysuria

## 2022-07-17 DIAGNOSIS — E11.65 TYPE 2 DIABETES MELLITUS WITH HYPERGLYCEMIA: ICD-10-CM

## 2022-07-17 LAB
ANION GAP SERPL CALC-SCNC: 11 MMOL/L — SIGNIFICANT CHANGE UP (ref 5–17)
BASOPHILS # BLD AUTO: 0.04 K/UL — SIGNIFICANT CHANGE UP (ref 0–0.2)
BASOPHILS NFR BLD AUTO: 0.2 % — SIGNIFICANT CHANGE UP (ref 0–2)
BUN SERPL-MCNC: 28 MG/DL — HIGH (ref 7–23)
CALCIUM SERPL-MCNC: 9.9 MG/DL — SIGNIFICANT CHANGE UP (ref 8.5–10.1)
CHLORIDE SERPL-SCNC: 95 MMOL/L — LOW (ref 96–108)
CO2 SERPL-SCNC: 23 MMOL/L — SIGNIFICANT CHANGE UP (ref 22–31)
CREAT SERPL-MCNC: 1.57 MG/DL — HIGH (ref 0.5–1.3)
EGFR: 36 ML/MIN/1.73M2 — LOW
EOSINOPHIL # BLD AUTO: 0.07 K/UL — SIGNIFICANT CHANGE UP (ref 0–0.5)
EOSINOPHIL NFR BLD AUTO: 0.4 % — SIGNIFICANT CHANGE UP (ref 0–6)
GLUCOSE BLDC GLUCOMTR-MCNC: 289 MG/DL — HIGH (ref 70–99)
GLUCOSE BLDC GLUCOMTR-MCNC: 319 MG/DL — HIGH (ref 70–99)
GLUCOSE BLDC GLUCOMTR-MCNC: 396 MG/DL — HIGH (ref 70–99)
GLUCOSE BLDC GLUCOMTR-MCNC: 396 MG/DL — HIGH (ref 70–99)
GLUCOSE BLDC GLUCOMTR-MCNC: 419 MG/DL — HIGH (ref 70–99)
GLUCOSE BLDC GLUCOMTR-MCNC: 436 MG/DL — HIGH (ref 70–99)
GLUCOSE SERPL-MCNC: 368 MG/DL — HIGH (ref 70–99)
HCT VFR BLD CALC: 34.3 % — LOW (ref 34.5–45)
HGB BLD-MCNC: 11.7 G/DL — SIGNIFICANT CHANGE UP (ref 11.5–15.5)
IMM GRANULOCYTES NFR BLD AUTO: 0.6 % — SIGNIFICANT CHANGE UP (ref 0–1.5)
LYMPHOCYTES # BLD AUTO: 12.4 % — LOW (ref 13–44)
LYMPHOCYTES # BLD AUTO: 2.09 K/UL — SIGNIFICANT CHANGE UP (ref 1–3.3)
MCHC RBC-ENTMCNC: 29.3 PG — SIGNIFICANT CHANGE UP (ref 27–34)
MCHC RBC-ENTMCNC: 34.1 G/DL — SIGNIFICANT CHANGE UP (ref 32–36)
MCV RBC AUTO: 86 FL — SIGNIFICANT CHANGE UP (ref 80–100)
MONOCYTES # BLD AUTO: 0.94 K/UL — HIGH (ref 0–0.9)
MONOCYTES NFR BLD AUTO: 5.6 % — SIGNIFICANT CHANGE UP (ref 2–14)
NEUTROPHILS # BLD AUTO: 13.63 K/UL — HIGH (ref 1.8–7.4)
NEUTROPHILS NFR BLD AUTO: 80.8 % — HIGH (ref 43–77)
NRBC # BLD: 0 /100 WBCS — SIGNIFICANT CHANGE UP (ref 0–0)
PLATELET # BLD AUTO: 269 K/UL — SIGNIFICANT CHANGE UP (ref 150–400)
POTASSIUM SERPL-MCNC: 4.5 MMOL/L — SIGNIFICANT CHANGE UP (ref 3.5–5.3)
POTASSIUM SERPL-SCNC: 4.5 MMOL/L — SIGNIFICANT CHANGE UP (ref 3.5–5.3)
RBC # BLD: 3.99 M/UL — SIGNIFICANT CHANGE UP (ref 3.8–5.2)
RBC # FLD: 13 % — SIGNIFICANT CHANGE UP (ref 10.3–14.5)
SODIUM SERPL-SCNC: 129 MMOL/L — LOW (ref 135–145)
WBC # BLD: 16.87 K/UL — HIGH (ref 3.8–10.5)
WBC # FLD AUTO: 16.87 K/UL — HIGH (ref 3.8–10.5)

## 2022-07-17 PROCEDURE — 70450 CT HEAD/BRAIN W/O DYE: CPT | Mod: 26

## 2022-07-17 PROCEDURE — 99232 SBSQ HOSP IP/OBS MODERATE 35: CPT

## 2022-07-17 RX ORDER — BENZOCAINE AND MENTHOL 5; 1 G/100ML; G/100ML
1 LIQUID ORAL THREE TIMES A DAY
Refills: 0 | Status: DISCONTINUED | OUTPATIENT
Start: 2022-07-17 | End: 2022-07-29

## 2022-07-17 RX ORDER — INSULIN LISPRO 100/ML
10 VIAL (ML) SUBCUTANEOUS
Refills: 0 | Status: DISCONTINUED | OUTPATIENT
Start: 2022-07-17 | End: 2022-07-29

## 2022-07-17 RX ORDER — INSULIN GLARGINE 100 [IU]/ML
25 INJECTION, SOLUTION SUBCUTANEOUS AT BEDTIME
Refills: 0 | Status: DISCONTINUED | OUTPATIENT
Start: 2022-07-17 | End: 2022-07-19

## 2022-07-17 RX ADMIN — PANTOPRAZOLE SODIUM 40 MILLIGRAM(S): 20 TABLET, DELAYED RELEASE ORAL at 08:24

## 2022-07-17 RX ADMIN — Medication 6: at 16:07

## 2022-07-17 RX ADMIN — MORPHINE SULFATE 15 MILLIGRAM(S): 50 CAPSULE, EXTENDED RELEASE ORAL at 18:45

## 2022-07-17 RX ADMIN — SENNA PLUS 2 TABLET(S): 8.6 TABLET ORAL at 21:45

## 2022-07-17 RX ADMIN — Medication 10 UNIT(S): at 16:07

## 2022-07-17 RX ADMIN — Medication 25 MILLIGRAM(S): at 05:39

## 2022-07-17 RX ADMIN — MORPHINE SULFATE 15 MILLIGRAM(S): 50 CAPSULE, EXTENDED RELEASE ORAL at 06:39

## 2022-07-17 RX ADMIN — Medication 325 MILLIGRAM(S): at 19:13

## 2022-07-17 RX ADMIN — CYCLOBENZAPRINE HYDROCHLORIDE 10 MILLIGRAM(S): 10 TABLET, FILM COATED ORAL at 05:39

## 2022-07-17 RX ADMIN — CYCLOBENZAPRINE HYDROCHLORIDE 10 MILLIGRAM(S): 10 TABLET, FILM COATED ORAL at 14:44

## 2022-07-17 RX ADMIN — INSULIN GLARGINE 25 UNIT(S): 100 INJECTION, SOLUTION SUBCUTANEOUS at 21:46

## 2022-07-17 RX ADMIN — BENZOCAINE AND MENTHOL 1 LOZENGE: 5; 1 LIQUID ORAL at 21:47

## 2022-07-17 RX ADMIN — POLYETHYLENE GLYCOL 3350 17 GRAM(S): 17 POWDER, FOR SOLUTION ORAL at 14:45

## 2022-07-17 RX ADMIN — Medication 5 UNIT(S): at 08:10

## 2022-07-17 RX ADMIN — MONTELUKAST 10 MILLIGRAM(S): 4 TABLET, CHEWABLE ORAL at 14:44

## 2022-07-17 RX ADMIN — Medication 325 MILLIGRAM(S): at 05:39

## 2022-07-17 RX ADMIN — CYCLOBENZAPRINE HYDROCHLORIDE 10 MILLIGRAM(S): 10 TABLET, FILM COATED ORAL at 21:45

## 2022-07-17 RX ADMIN — ATORVASTATIN CALCIUM 10 MILLIGRAM(S): 80 TABLET, FILM COATED ORAL at 14:49

## 2022-07-17 RX ADMIN — MORPHINE SULFATE 15 MILLIGRAM(S): 50 CAPSULE, EXTENDED RELEASE ORAL at 17:45

## 2022-07-17 RX ADMIN — Medication 650 MILLIGRAM(S): at 15:48

## 2022-07-17 RX ADMIN — Medication 650 MILLIGRAM(S): at 14:48

## 2022-07-17 RX ADMIN — MORPHINE SULFATE 15 MILLIGRAM(S): 50 CAPSULE, EXTENDED RELEASE ORAL at 05:39

## 2022-07-17 RX ADMIN — Medication 5 UNIT(S): at 11:21

## 2022-07-17 RX ADMIN — Medication 8: at 11:21

## 2022-07-17 RX ADMIN — Medication 10: at 08:10

## 2022-07-17 RX ADMIN — Medication 4: at 21:46

## 2022-07-17 NOTE — PROGRESS NOTE ADULT - ASSESSMENT
66F Ma[place] speaking PMHx of HTN, HLD, DM 2  presenting with left hip pain x 1-2 days. Described as mild, achy, shooting pain down left buttocks to knee. Exacerbated w/ standing and position. Pain level is 8/10 in intensity, not getting better after multiple doses of pain meds in ER. Otherwise, the patient denies any neck pain, headaches, chest pain, shortness of breath, n/v/d, abdominal pain, recent illness, fevers, chills, recent spinal/back surgeries or procedures, bowel or bladder incontinence, bowel or bladder retention, constipation, IV drug use, known cancer history, recent weight loss, trauma, weakness, other subjective neurological deficits, numbness/tingling, dysuria, hematuria, rashes. Ct Abd/Pelvis and CT Lumbar Spine :Mild motion artifact in the abdomen. No evidence of acute abdominal or pelvic abnormality. No evidence of acute lumbar spine abnormality. Mild degenerative changes of the lower lumbar spine. US negative for DVT.     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   -CT Lumbar Spine: L3-4: Disc bulge slightly asymmetric to the left. Mild bilateral facet   arthropathy. Mild spinal canal stenosis and minimal left neural foraminal   narrowing.  L4-5: Moderate disc bulge, mild bilateral facet arthropathy and   thickening of ligamenta flava. Moderate to severe spinal canal stenosis.   Mild to moderate left greater than right neural foraminal narrowing.  L5-S1: Mild disc bulge. Moderate bilateral facet arthropathy.   Mild/moderate spinal canal stenosis. Mass effect on the traversing   bilateral S1 nerve roots. Mild bilateral neural foraminal narrowing.  Orthopedic on board   7/14 L4-5 decompression, TLIF   - IV abx, Peyman drain, lumosacral orthosis brace,  Decadron   -  cont w/ analgesics prn   -MAIA on discharge     Acute Metabolic Encephalopathy   Unclear etiology at this time, ?opioid, ?steroids   will get Head CT, monitor mentation    DM II   HgA1c: 8.4% uncontrolled   cont w/  lantus and prandial insulin   cont w/ FS monitoring w/ insulin s/s coverage for now     Stage 3 chronic kidney disease.   avoid nephrotoxic medications, renal dose meds   cont to monitor renal function as needed     Hypertension   cont w/ Metoprolol.    Dyslipidemia.   cont w/ Atorvastatin.    DVTp: heparin   Disposition: MAIA     [place]het[place] speaking; language line was used.   Son Moy [phone]

## 2022-07-17 NOTE — PROVIDER CONTACT NOTE (CRITICAL VALUE NOTIFICATION) - SITUATION
Blood Glucose critical. Pt due to Lantus 25 and sliding scal 4unit. Family at [facility], Pt had cookies and coffee prior to accu check.

## 2022-07-17 NOTE — CONSULT NOTE ADULT - SUBJECTIVE AND OBJECTIVE BOX
Patient is a 66y old  Female who presents with a chief complaint of Left leg/thigh pain. (17 Jul 2022 12:33)      Reason For Consult: uncontrolled diabetes     HPI:  Patient is 66F Icelandic speaking PMHx of dm-2, htn hld presenting with left hip pain x 1-2 days. Described as mild, achy, shooting pain down left buttocks to knee. Exacerbated w/ standing and position. Pain level is 8/10 in intensity, not getting better after mutiple doses of pain meds in ER. Otherwise, the patient denies any neck pain, headaches, chest pain, shortness of breath, n/v/d, abdominal pain, recent illness, fevers, chills, recent spinal/back surgeries or procedures, bowel or bladder incontinence, bowel or bladder retention, constipation, IV drug use, known cancer history, recent weight loss, trauma, weakness, other subjective neurological deficits, numbness/tingling, dysuria, hematuria, rashes. Ct Abd/Pelvis and CT Lumbar Spine :Mild motion artifact in the abdomen. No evidence of acute abdominal or pelvic abnormality.No evidence of acute lumbar spine abnormality. Mild degenerative changes of the lower lumbar spine. US negative for DVT   (13 Jul 2022 04:23)  HbA1c 8.4.  Currently patient is on  25 units of Lantus and also 10 units of prandial lispro.  GFR is decreased and creatinine is at 1.57.  Patient has increased insulin resistance and fingersticks are running in the 300s slightly decreased now    PAST MEDICAL & SURGICAL HISTORY:  Diabetes mellitus      HTN (hypertension)      HLD (hyperlipidemia)      Stage 3 chronic kidney disease      Status post cholecystectomy          FAMILY HISTORY:        Social History:    MEDICATIONS  (STANDING):  atorvastatin Oral Tab/Cap - Peds 10 milliGRAM(s) Oral daily  cyclobenzaprine 10 milliGRAM(s) Oral every 8 hours  dextrose 5%. 1000 milliLiter(s) (50 mL/Hr) IV Continuous <Continuous>  dextrose 5%. 1000 milliLiter(s) (100 mL/Hr) IV Continuous <Continuous>  dextrose 5%. 1000 milliLiter(s) (50 mL/Hr) IV Continuous <Continuous>  dextrose 50% Injectable 25 Gram(s) IV Push once  dextrose 50% Injectable 25 Gram(s) IV Push once  dextrose 50% Injectable 12.5 Gram(s) IV Push once  dextrose 50% Injectable 25 Gram(s) IV Push once  ferrous    sulfate 325 milliGRAM(s) Oral two times a day  glucagon  Injectable 1 milliGRAM(s) IntraMuscular once  glucagon  Injectable 1 milliGRAM(s) IntraMuscular once  insulin glargine Injectable (LANTUS) 25 Unit(s) SubCutaneous at bedtime  insulin lispro (ADMELOG) corrective regimen sliding scale   SubCutaneous three times a day before meals  insulin lispro (ADMELOG) corrective regimen sliding scale   SubCutaneous at bedtime  insulin lispro Injectable (ADMELOG) 10 Unit(s) SubCutaneous three times a day before meals  metoprolol succinate ER 25 milliGRAM(s) Oral daily  montelukast 10 milliGRAM(s) Oral daily  morphine ER Tablet 15 milliGRAM(s) Oral two times a day  naloxone Injectable 0.4 milliGRAM(s) IV Push once  pantoprazole    Tablet 40 milliGRAM(s) Oral before breakfast  polyethylene glycol 3350 17 Gram(s) Oral daily  senna 2 Tablet(s) Oral at bedtime    MEDICATIONS  (PRN):  acetaminophen     Tablet .. 650 milliGRAM(s) Oral every 6 hours PRN Temp greater or equal to 38C (100.4F), Mild Pain (1 - 3)  bisacodyl 5 milliGRAM(s) Oral every 12 hours PRN Constipation  dextrose Oral Gel 15 Gram(s) Oral once PRN Blood Glucose LESS THAN 70 milliGRAM(s)/deciliter  dextrose Oral Gel 15 Gram(s) Oral once PRN Blood Glucose LESS THAN 70 milliGRAM(s)/deciliter  HYDROmorphone  Injectable 1 milliGRAM(s) IV Push every 4 hours PRN Severe Pain (7 - 10)  magnesium hydroxide Suspension 30 milliLiter(s) Oral every 12 hours PRN Constipation  melatonin 3 milliGRAM(s) Oral at bedtime PRN Insomnia  ondansetron Injectable 4 milliGRAM(s) IV Push every 6 hours PRN Nausea and/or Vomiting  oxycodone    5 mG/acetaminophen 325 mG 2 Tablet(s) Oral every 4 hours PRN Moderate Pain (4 - 6)      REVIEW OF SYSTEMS:  CONSTITUTIONAL:  as per HPI  HEENT:  Eyes:  No diplopia or blurred vision.   ENT:  No earache, sore throat or runny nose.  CARDIOVASCULAR:  No chest pain .  RESPIRATORY:  No cough, shortness of breath, PND or orthopnea.  GASTROINTESTINAL:  No nausea, vomiting or diarrhea.  GENITOURINARY:  No dysuria, frequency or urgency. No Blood in urine  MUSCULOSKELETAL:  no joint aches, no muscle pain, myalgia  SKIN:  No change in skin, hair or nails.  NEUROLOGIC:  No paresthesias, fasciculations, seizures or weakness.  PSYCHIATRIC:  No disorder of thought or mood.  ENDOCRINE:  No heat or cold intolerance, polyuria or polydipsia. abnormal weight gain or loss, oral thrush  HEMATOLOGICAL:  No easy bruising or bleeding.     T(C): 36.8 (07-17-22 @ 11:38), Max: 38.3 (07-16-22 @ 20:00)  HR: 93 (07-17-22 @ 11:38) (84 - 99)  BP: 128/72 (07-17-22 @ 11:38) (122/65 - 148/82)  RR: 16 (07-17-22 @ 11:38) (16 - 18)  SpO2: 98% (07-17-22 @ 11:38) (93% - 98%)  Wt(kg): --    PHYSICAL EXAM:  GENERAL: NAD, well-groomed, well-developed  HEAD:  Atraumatic, Normocephalic  EYES: PERRLA, conjunctiva and sclera clear  ENMT: No  exudates,, Moist mucous membranes,, No lesions  NECK: Supple, No JVD,   NERVOUS SYSTEM:  Alert & Oriented   CHEST/LUNG: Clear to percussion bilaterally; No rales, rhonchi, wheezing, or rubs  HEART: Regular rate and rhythm; No murmurs, rubs, or gallops  ABDOMEN: Soft, Nontender, Nondistended; Bowel sounds present  EXTREMITIES:  2+ Peripheral Pulses, No clubbing, cyanosis, or edema  LYMPH: No lymphadenopathy noted  SKIN: No rashes or lesions    CAPILLARY BLOOD GLUCOSE      POCT Blood Glucose.: 319 mg/dL (17 Jul 2022 11:04)  POCT Blood Glucose.: 396 mg/dL (17 Jul 2022 07:47)  POCT Blood Glucose.: 254 mg/dL (16 Jul 2022 21:38)  POCT Blood Glucose.: 238 mg/dL (16 Jul 2022 16:03)                            11.7   16.87 )-----------( 269      ( 17 Jul 2022 09:27 )             34.3       CMP:  07-17 @ 09:27  SGPT --  Albumin --   Alk Phos --   Anion Gap 11   SGOT --   Total Bili --   BUN 28   Calcium Total 9.9   CO2 23   Chloride 95   Creatinine 1.57   eGFR if AA --   eGFR if non AA --   Glucose 368   Potassium 4.5   Protein --   Sodium 129      Thyroid Function Tests:      Diabetes Tests:     Parathyroids:     Adrenals:       Radiology:

## 2022-07-17 NOTE — PROGRESS NOTE ADULT - SUBJECTIVE AND OBJECTIVE BOX
LABS:                        11.4   15.79 )-----------( 267      ( 16 Jul 2022 06:05 )             33.5     07-16    134<L>  |  100  |  26<H>  ----------------------------<  352<H>  4.5   |  23  |  1.52<H>    Ca    9.3      16 Jul 2022 06:05            VITAL SIGNS:  T(C): 36.6 (07-17-22 @ 05:05), Max: 38.3 (07-16-22 @ 20:00)  HR: 99 (07-17-22 @ 05:05) (84 - 99)  BP: 148/82 (07-17-22 @ 05:05) (122/65 - 148/82)  RR: 16 (07-17-22 @ 05:05) (16 - 18)  SpO2: 97% (07-17-22 @ 05:05) (93% - 97%)Patient seen and examined at bedside. Patient is complaining of back pain radiating into both legs (L>R). States it has worsened since the surgery. Denies weakness, numbness or tingling. Denies chest pain, shortness of breath, nausea or vomiting. After patton was removed 2 days prior, pts PVR increased to 591. Pt was straight cathed and 500cc urine was drained.          PE  General: awake, alert     Spine:  Dressing C/D/I  +TTP over Lspine  2+ radial pulses  2+ DP Pulses    Motor:                   C5                C6              C7               C8           T1   R             5/5                5/5            5/5              5/5          5/5  L             5/5                5/5            5/5              5/5          5/5                    L2                  L3             L4              L5            S1  R            4/5                5/5             5/5            5/5          5/5  L             4/5                5/5            5/5            5/5          5/5    Sensory:            C5         C6         C7      C8       T1        (0=absent, 1=impaired, 2=normal, NT=not testable)  R         2            2           2        2         2  L          2            2           2        2         2               L2          L3         L4      L5       S1         (0=absent, 1=impaired, 2=normal, NT=not testable)  R         2            2            2        2        2  L          2            2           2        2         2                     A/P:  66y f s/p L4-5 decompression, TLIF POD 3, Stable    -Medical management appreciated  -PT/OT   -WBAT  -lumbosacral orthosis brace to be worn with ambulation/PT   -Postop PVR: 0  -Repeat PVR 7/16: 591, pt was straight cathed and drained 500cc  -Pain Control PRN  -DVT ppx - HOLD ALL CHEMICAL DVT PPX s/p spine surgery  -FU AM Labs  -Incentive Spirometry  -urology consulted, f/u recs regarding patton  -pt may require long term patton if bladder functions continues to not improve  -TOV, bladder scan if not urinating and straight cath x3 before inserting another patton  -Will Discuss with attending Dr. Christopher and advise if any changes to plan LABS:                        11.4   15.79 )-----------( 267      ( 16 Jul 2022 06:05 )             33.5     07-16    134<L>  |  100  |  26<H>  ----------------------------<  352<H>  4.5   |  23  |  1.52<H>    Ca    9.3      16 Jul 2022 06:05            VITAL SIGNS:  T(C): 36.6 (07-17-22 @ 05:05), Max: 38.3 (07-16-22 @ 20:00)  HR: 99 (07-17-22 @ 05:05) (84 - 99)  BP: 148/82 (07-17-22 @ 05:05) (122/65 - 148/82)  RR: 16 (07-17-22 @ 05:05) (16 - 18)  SpO2: 97% (07-17-22 @ 05:05) (93% - 97%)Patient seen and examined at bedside. Patient is complaining of back pain radiating into both legs (L>R). States it has worsened since the surgery. Denies weakness, numbness or tingling. Denies chest pain, shortness of breath, nausea or vomiting. After patton was removed 2 days prior, pts PVR increased to 591. Pt was straight cathed and 500cc urine was drained.          PE  General: awake, alert     Spine:  Dressing C/D/I  +TTP over Lspine  2+ radial pulses  2+ DP Pulses    Motor:                   C5                C6              C7               C8           T1   R             5/5                5/5            5/5              5/5          5/5  L             5/5                5/5            5/5              5/5          5/5                    L2                  L3             L4              L5            S1  R            4/5                5/5             5/5            5/5          5/5  L             4/5                5/5            5/5            5/5          5/5    Sensory:            C5         C6         C7      C8       T1        (0=absent, 1=impaired, 2=normal, NT=not testable)  R         2            2           2        2         2  L          2            2           2        2         2               L2          L3         L4      L5       S1         (0=absent, 1=impaired, 2=normal, NT=not testable)  R         2            2            2        2        2  L          2            2           2        2         2                     A/P:  66y f s/p L4-5 decompression, TLIF POD 3, Stable    -Medical management appreciated  -PT/OT   -WBAT  -lumbosacral orthosis brace to be worn with ambulation/PT   -Postop PVR: 0  -Repeat PVR 7/16: 591, pt was straight cathed and drained 500cc  -Pain Control PRN  -DVT ppx - HOLD ALL CHEMICAL DVT PPX s/p spine surgery  -FU AM Labs  -Incentive Spirometry  -urology consulted, recs appreciated  -pt may require long term patton if bladder functions continues to not improve  -TOV today before placing patton  -Will Discuss with attending Dr. Christopher and advise if any changes to plan Patient seen and examined at bedside this AM with Dr. Christopher. Patient is complaining of back pain radiating into both legs (L>R). Denies new weakness, numbness or tingling. Denies chest pain, shortness of breath, nausea or vomiting. After Patton, pts PVR increased to 591. Pt was straight cathed x1 overnight and 500cc urine was drained. Per Nurse patient       LABS:                        11.7   16.87 )-----------( 269      ( 17 Jul 2022 09:27 )             34.3     07-16    134<L>  |  100  |  26<H>  ----------------------------<  352<H>  4.5   |  23  |  1.52<H>    Ca    9.3      16 Jul 2022 06:05            VITAL SIGNS:  T(C): 36.6 (07-17-22 @ 05:05), Max: 38.3 (07-16-22 @ 20:00)  HR: 99 (07-17-22 @ 05:05) (84 - 99)  BP: 148/82 (07-17-22 @ 05:05) (122/65 - 148/82)  RR: 16 (07-17-22 @ 05:05) (16 - 18)  SpO2: 97% (07-17-22 @ 05:05) (93% - 97%)      PE  General: awake, alert     Spine:  Dressing C/D/I  +TTP over LSpine  + radial pulses  + DP Pulses    Motor:                   C5                C6              C7               C8           T1   R             5/5                5/5            5/5              5/5          5/5  L             5/5                5/5            5/5              5/5          5/5                    L2                  L3             L4              L5            S1  R            4/5                5/5             5/5            5/5          5/5  L             4/5                5/5            5/5            5/5          5/5    Sensory:            C5         C6         C7      C8       T1        (0=absent, 1=impaired, 2=normal, NT=not testable)  R         2            2           2        2         2  L          2            2           2        2         2               L2          L3         L4      L5       S1         (0=absent, 1=impaired, 2=normal, NT=not testable)  R         2            2            2        2        2  L          2            2           2        2         2                     A/P:  66y f s/p L4-5 decompression, TLIF POD 3, Stable    -Medical management appreciated  -PT/OT   -WBAT  -lumbosacral orthosis brace to be worn with ambulation/PT   -Postop PVR: 0; ?accuracy and timing with regards to patton removal  -PVR 7/16: 591, pt was straight cathed and drained 500cc  -Overnight 7/16 patient voided x2 300ml w/ rpt ; Voided 150 cc w/ rpt pvr 245  -Pain Control PRN  -DVT ppx - HOLD ALL CHEMICAL DVT PPX s/p spine surgery  -FU AM Labs  -Incentive Spirometry  -urology consulted, recs appreciated  -Risks v benefits of chronic patton discussed; Including need for close urology outpatient follow up for continued exchange and risk of UTI; Discussed possibility of bladder function not returning. Will monitor.   -Repeat BS AM 7/17, if >250 cc will place patton   -Will update family accordingly  -Bladder function appears to be gradually improving, albeit slowly; contractility improving based upon PVRs, however component of overflow incontinence could be contributing  -pt may require long term patton if bladder functions continues to not improve  -Continue with Bowel Regimen   -Will Discuss with attending Dr. Christopher and advise if any changes to plan Patient seen and examined at bedside this AM with Dr. Christopher. Patient is complaining of back pain radiating into both legs (L>R). Denies new weakness, numbness or tingling. Denies chest pain, shortness of breath, nausea or vomiting. After Patton, pts PVR increased to 591. Pt was straight cathed x1 overnight and 500cc urine was drained. Per Nurse patient       LABS:                        11.7   16.87 )-----------( 269      ( 17 Jul 2022 09:27 )             34.3     07-16    134<L>  |  100  |  26<H>  ----------------------------<  352<H>  4.5   |  23  |  1.52<H>    Ca    9.3      16 Jul 2022 06:05            VITAL SIGNS:  T(C): 36.6 (07-17-22 @ 05:05), Max: 38.3 (07-16-22 @ 20:00)  HR: 99 (07-17-22 @ 05:05) (84 - 99)  BP: 148/82 (07-17-22 @ 05:05) (122/65 - 148/82)  RR: 16 (07-17-22 @ 05:05) (16 - 18)  SpO2: 97% (07-17-22 @ 05:05) (93% - 97%)      PE  General: awake, alert     Spine:  Dressing C/D/I  +TTP over LSpine  + radial pulses  + DP Pulses    Motor:                   C5                C6              C7               C8           T1   R             5/5                5/5            5/5              5/5          5/5  L             5/5                5/5            5/5              5/5          5/5                    L2                  L3             L4              L5            S1  R            4/5                5/5             5/5            5/5          5/5  L             4/5                5/5            5/5            5/5          5/5    Sensory:            C5         C6         C7      C8       T1        (0=absent, 1=impaired, 2=normal, NT=not testable)  R         2            2           2        2         2  L          2            2           2        2         2               L2          L3         L4      L5       S1         (0=absent, 1=impaired, 2=normal, NT=not testable)  R         2            2            2        2        2  L          2            2           2        2         2                     A/P:  66y f s/p L4-5 decompression, TLIF POD 3, Stable     -Medical management appreciated  -PT/OT   -WBAT  -lumbosacral orthosis brace to be worn with ambulation/PT   -Postop PVR: 0; ?accuracy and timing with regards to patton removal  -PVR 7/16: 591, pt was straight cathed and drained 500cc  -Overnight 7/16 patient voided x2 300ml w/ rpt ; Voided 150 cc w/ rpt pvr 245  -Pain Control PRN  -DVT ppx - HOLD ALL CHEMICAL DVT PPX s/p spine surgery  -FU AM Labs  -Incentive Spirometry  -urology consulted, recs appreciated  -Risks v benefits of chronic patton discussed; Including need for close urology outpatient follow up for continued exchange and risk of UTI; Discussed possibility of bladder function not returning. Will monitor.   -Repeat BS AM 7/17, if >250 cc will place patton   -Will update family accordingly  -Bladder function appears to be gradually improving, albeit slowly; contractility improving based upon PVRs, however component of overflow incontinence could be contributing  -pt may require long term patton if bladder functions continues to not improve  -Continue with Bowel Regimen   -Will Discuss with attending Dr. Christopher and advise if any changes to plan

## 2022-07-17 NOTE — CONSULT NOTE ADULT - PROBLEM SELECTOR RECOMMENDATION 9
Diabetic Ketoacidosis resolved   Patient has increased insulin resistance and also CKD III, continue with the same and expect blood glucose slowly decreased with decreasing glucose toxicity.  Aggressive management of other conditions currently.  If GFR becomes persistently >50 then low-dose metformin to decrease the insulin resistance can be given  while inpatient, finger sticks should be 100-180   Patient should have strict diet control and do exercise as tolerated upon discharge   Thank You for the courtesy of this consultation !!!

## 2022-07-17 NOTE — PROGRESS NOTE ADULT - SUBJECTIVE AND OBJECTIVE BOX
Patient is a 66y old  Female who presents with a chief complaint of Left leg/thigh pain. (17 Jul 2022 08:54)      INTERVAL HPI/ OVERNIGHT EVENTS: Pt was seen and examined at bedside today, No significant overnight events, pt is confused today, unable to provide ROS      MEDICATIONS  (STANDING):  atorvastatin Oral Tab/Cap - Peds 10 milliGRAM(s) Oral daily  cyclobenzaprine 10 milliGRAM(s) Oral every 8 hours  dextrose 5%. 1000 milliLiter(s) (50 mL/Hr) IV Continuous <Continuous>  dextrose 5%. 1000 milliLiter(s) (100 mL/Hr) IV Continuous <Continuous>  dextrose 5%. 1000 milliLiter(s) (50 mL/Hr) IV Continuous <Continuous>  dextrose 50% Injectable 25 Gram(s) IV Push once  dextrose 50% Injectable 25 Gram(s) IV Push once  dextrose 50% Injectable 12.5 Gram(s) IV Push once  dextrose 50% Injectable 25 Gram(s) IV Push once  ferrous    sulfate 325 milliGRAM(s) Oral two times a day  glucagon  Injectable 1 milliGRAM(s) IntraMuscular once  glucagon  Injectable 1 milliGRAM(s) IntraMuscular once  insulin glargine Injectable (LANTUS) 25 Unit(s) SubCutaneous at bedtime  insulin lispro (ADMELOG) corrective regimen sliding scale   SubCutaneous three times a day before meals  insulin lispro (ADMELOG) corrective regimen sliding scale   SubCutaneous at bedtime  insulin lispro Injectable (ADMELOG) 10 Unit(s) SubCutaneous three times a day before meals  metoprolol succinate ER 25 milliGRAM(s) Oral daily  montelukast 10 milliGRAM(s) Oral daily  morphine ER Tablet 15 milliGRAM(s) Oral two times a day  naloxone Injectable 0.4 milliGRAM(s) IV Push once  pantoprazole    Tablet 40 milliGRAM(s) Oral before breakfast  polyethylene glycol 3350 17 Gram(s) Oral daily  senna 2 Tablet(s) Oral at bedtime    MEDICATIONS  (PRN):  acetaminophen     Tablet .. 650 milliGRAM(s) Oral every 6 hours PRN Temp greater or equal to 38C (100.4F), Mild Pain (1 - 3)  bisacodyl 5 milliGRAM(s) Oral every 12 hours PRN Constipation  dextrose Oral Gel 15 Gram(s) Oral once PRN Blood Glucose LESS THAN 70 milliGRAM(s)/deciliter  dextrose Oral Gel 15 Gram(s) Oral once PRN Blood Glucose LESS THAN 70 milliGRAM(s)/deciliter  HYDROmorphone  Injectable 1 milliGRAM(s) IV Push every 4 hours PRN Severe Pain (7 - 10)  magnesium hydroxide Suspension 30 milliLiter(s) Oral every 12 hours PRN Constipation  melatonin 3 milliGRAM(s) Oral at bedtime PRN Insomnia  ondansetron Injectable 4 milliGRAM(s) IV Push every 6 hours PRN Nausea and/or Vomiting  oxycodone    5 mG/acetaminophen 325 mG 2 Tablet(s) Oral every 4 hours PRN Moderate Pain (4 - 6)      Allergies    No Known Allergies    Intolerances        REVIEW OF SYSTEMS:    Unable to examine due to [x ] Encephalopathy [ ] Advanced Dementia [ ] Expressive Aphasia [ ] Non-verbal patient          Vital Signs Last 24 Hrs  T(C): 36.8 (17 Jul 2022 11:38), Max: 38.3 (16 Jul 2022 20:00)  T(F): 98.2 (17 Jul 2022 11:38), Max: 100.9 (16 Jul 2022 20:00)  HR: 93 (17 Jul 2022 11:38) (84 - 99)  BP: 128/72 (17 Jul 2022 11:38) (122/65 - 148/82)  BP(mean): --  RR: 16 (17 Jul 2022 11:38) (16 - 18)  SpO2: 98% (17 Jul 2022 11:38) (93% - 98%)    Parameters below as of 17 Jul 2022 11:38  Patient On (Oxygen Delivery Method): room air        PHYSICAL EXAM:  GENERAL: NAD, well-developed, well-groomed  HEAD:  Atraumatic, Normocephalic  EYES: conjunctiva and sclera clear  ENMT: Moist mucous membranes  NECK: Supple, No JVD, Normal thyroid  CHEST/LUNG: Clear to Auscultation bilaterally; No rales, rhonchi, wheezing, or rubs  HEART: Regular rate and rhythm; No murmurs, rubs, or gallops  ABDOMEN: Soft, Nontender, Nondistended; Bowel sounds present  EXTREMITIES:  2+ Peripheral Pulses, No clubbing, cyanosis, or edema  SKIN: No rashes or lesions  NERVOUS SYSTEM:  confused, unable to provide neuro exam     LABS:                        11.7   16.87 )-----------( 269      ( 17 Jul 2022 09:27 )             34.3     07-17    129<L>  |  95<L>  |  28<H>  ----------------------------<  368<H>  4.5   |  23  |  1.57<H>    Ca    9.9      17 Jul 2022 09:27          CAPILLARY BLOOD GLUCOSE      POCT Blood Glucose.: 319 mg/dL (17 Jul 2022 11:04)  POCT Blood Glucose.: 396 mg/dL (17 Jul 2022 07:47)  POCT Blood Glucose.: 254 mg/dL (16 Jul 2022 21:38)  POCT Blood Glucose.: 238 mg/dL (16 Jul 2022 16:03)        Culture - Urine (collected 07-12-22)  Source: Clean Catch Clean Catch (Midstream)  Final Report (07-14-22):    <10,000 CFU/mL Normal Urogenital Mireille        RADIOLOGY & ADDITIONAL TESTS:          Imaging Personally Reviewed:  [ ] YES  [ ] NO    Consultant(s) Notes Reviewed:  [ ] YES  [ ] NO    Care Discussed with Consultants/Other Providers [x ] YES  [ ] NO

## 2022-07-17 NOTE — CONSULT NOTE ADULT - ASSESSMENT
diabetes 
65 yo F patient with cauda equina now s/p surgical decompression w/ resulting urinary retention most consistent with neurogenic bladder.  In the case of neurological insult, neurogenic bladder may take several months to resolve/improve and the patient's new baseline bladder function will not be known until then.  For now:  - Recommend indwelling patton catheter to decompress the bladder  - May consider repeat void trial prior to leaving for rehab; howerver, there is a high likelihood of failure  - Will need close follow-up as an outpatient to monitor progress of neurogenic bladder and possible outpatient urodynamics testing

## 2022-07-18 LAB
ANION GAP SERPL CALC-SCNC: 9 MMOL/L — SIGNIFICANT CHANGE UP (ref 5–17)
BASOPHILS # BLD AUTO: 0.04 K/UL — SIGNIFICANT CHANGE UP (ref 0–0.2)
BASOPHILS NFR BLD AUTO: 0.3 % — SIGNIFICANT CHANGE UP (ref 0–2)
BUN SERPL-MCNC: 26 MG/DL — HIGH (ref 7–23)
CALCIUM SERPL-MCNC: 10.2 MG/DL — HIGH (ref 8.5–10.1)
CHLORIDE SERPL-SCNC: 93 MMOL/L — LOW (ref 96–108)
CO2 SERPL-SCNC: 27 MMOL/L — SIGNIFICANT CHANGE UP (ref 22–31)
CREAT SERPL-MCNC: 1.44 MG/DL — HIGH (ref 0.5–1.3)
EGFR: 40 ML/MIN/1.73M2 — LOW
EOSINOPHIL # BLD AUTO: 0.22 K/UL — SIGNIFICANT CHANGE UP (ref 0–0.5)
EOSINOPHIL NFR BLD AUTO: 1.4 % — SIGNIFICANT CHANGE UP (ref 0–6)
GLUCOSE BLDC GLUCOMTR-MCNC: 259 MG/DL — HIGH (ref 70–99)
GLUCOSE BLDC GLUCOMTR-MCNC: 345 MG/DL — HIGH (ref 70–99)
GLUCOSE BLDC GLUCOMTR-MCNC: 382 MG/DL — HIGH (ref 70–99)
GLUCOSE BLDC GLUCOMTR-MCNC: 386 MG/DL — HIGH (ref 70–99)
GLUCOSE SERPL-MCNC: 355 MG/DL — HIGH (ref 70–99)
HCT VFR BLD CALC: 34.2 % — LOW (ref 34.5–45)
HGB BLD-MCNC: 11.7 G/DL — SIGNIFICANT CHANGE UP (ref 11.5–15.5)
IMM GRANULOCYTES NFR BLD AUTO: 0.7 % — SIGNIFICANT CHANGE UP (ref 0–1.5)
LYMPHOCYTES # BLD AUTO: 15.5 % — SIGNIFICANT CHANGE UP (ref 13–44)
LYMPHOCYTES # BLD AUTO: 2.37 K/UL — SIGNIFICANT CHANGE UP (ref 1–3.3)
MCHC RBC-ENTMCNC: 29.3 PG — SIGNIFICANT CHANGE UP (ref 27–34)
MCHC RBC-ENTMCNC: 34.2 G/DL — SIGNIFICANT CHANGE UP (ref 32–36)
MCV RBC AUTO: 85.7 FL — SIGNIFICANT CHANGE UP (ref 80–100)
MONOCYTES # BLD AUTO: 0.76 K/UL — SIGNIFICANT CHANGE UP (ref 0–0.9)
MONOCYTES NFR BLD AUTO: 5 % — SIGNIFICANT CHANGE UP (ref 2–14)
NEUTROPHILS # BLD AUTO: 11.77 K/UL — HIGH (ref 1.8–7.4)
NEUTROPHILS NFR BLD AUTO: 77.1 % — HIGH (ref 43–77)
NRBC # BLD: 0 /100 WBCS — SIGNIFICANT CHANGE UP (ref 0–0)
PLATELET # BLD AUTO: 288 K/UL — SIGNIFICANT CHANGE UP (ref 150–400)
POTASSIUM SERPL-MCNC: 4.6 MMOL/L — SIGNIFICANT CHANGE UP (ref 3.5–5.3)
POTASSIUM SERPL-SCNC: 4.6 MMOL/L — SIGNIFICANT CHANGE UP (ref 3.5–5.3)
RBC # BLD: 3.99 M/UL — SIGNIFICANT CHANGE UP (ref 3.8–5.2)
RBC # FLD: 13.1 % — SIGNIFICANT CHANGE UP (ref 10.3–14.5)
SODIUM SERPL-SCNC: 129 MMOL/L — LOW (ref 135–145)
WBC # BLD: 15.26 K/UL — HIGH (ref 3.8–10.5)
WBC # FLD AUTO: 15.26 K/UL — HIGH (ref 3.8–10.5)

## 2022-07-18 PROCEDURE — 99232 SBSQ HOSP IP/OBS MODERATE 35: CPT

## 2022-07-18 RX ORDER — SODIUM CHLORIDE 9 MG/ML
1000 INJECTION INTRAMUSCULAR; INTRAVENOUS; SUBCUTANEOUS
Refills: 0 | Status: DISCONTINUED | OUTPATIENT
Start: 2022-07-18 | End: 2022-07-26

## 2022-07-18 RX ADMIN — Medication 325 MILLIGRAM(S): at 19:05

## 2022-07-18 RX ADMIN — Medication 10: at 07:54

## 2022-07-18 RX ADMIN — OXYCODONE AND ACETAMINOPHEN 2 TABLET(S): 5; 325 TABLET ORAL at 09:04

## 2022-07-18 RX ADMIN — Medication 650 MILLIGRAM(S): at 21:50

## 2022-07-18 RX ADMIN — CYCLOBENZAPRINE HYDROCHLORIDE 10 MILLIGRAM(S): 10 TABLET, FILM COATED ORAL at 06:02

## 2022-07-18 RX ADMIN — Medication 10 UNIT(S): at 16:34

## 2022-07-18 RX ADMIN — MORPHINE SULFATE 15 MILLIGRAM(S): 50 CAPSULE, EXTENDED RELEASE ORAL at 06:03

## 2022-07-18 RX ADMIN — Medication 10 UNIT(S): at 07:55

## 2022-07-18 RX ADMIN — MORPHINE SULFATE 15 MILLIGRAM(S): 50 CAPSULE, EXTENDED RELEASE ORAL at 07:03

## 2022-07-18 RX ADMIN — CYCLOBENZAPRINE HYDROCHLORIDE 10 MILLIGRAM(S): 10 TABLET, FILM COATED ORAL at 14:12

## 2022-07-18 RX ADMIN — PANTOPRAZOLE SODIUM 40 MILLIGRAM(S): 20 TABLET, DELAYED RELEASE ORAL at 07:55

## 2022-07-18 RX ADMIN — SODIUM CHLORIDE 75 MILLILITER(S): 9 INJECTION INTRAMUSCULAR; INTRAVENOUS; SUBCUTANEOUS at 09:57

## 2022-07-18 RX ADMIN — Medication 10 UNIT(S): at 12:00

## 2022-07-18 RX ADMIN — Medication 650 MILLIGRAM(S): at 22:20

## 2022-07-18 RX ADMIN — Medication 2: at 21:44

## 2022-07-18 RX ADMIN — Medication 10: at 16:33

## 2022-07-18 RX ADMIN — Medication 6: at 11:59

## 2022-07-18 RX ADMIN — POLYETHYLENE GLYCOL 3350 17 GRAM(S): 17 POWDER, FOR SOLUTION ORAL at 12:03

## 2022-07-18 RX ADMIN — SENNA PLUS 2 TABLET(S): 8.6 TABLET ORAL at 21:49

## 2022-07-18 RX ADMIN — ATORVASTATIN CALCIUM 10 MILLIGRAM(S): 80 TABLET, FILM COATED ORAL at 12:05

## 2022-07-18 RX ADMIN — INSULIN GLARGINE 25 UNIT(S): 100 INJECTION, SOLUTION SUBCUTANEOUS at 21:45

## 2022-07-18 RX ADMIN — Medication 325 MILLIGRAM(S): at 06:03

## 2022-07-18 RX ADMIN — MONTELUKAST 10 MILLIGRAM(S): 4 TABLET, CHEWABLE ORAL at 12:04

## 2022-07-18 RX ADMIN — OXYCODONE AND ACETAMINOPHEN 2 TABLET(S): 5; 325 TABLET ORAL at 08:04

## 2022-07-18 RX ADMIN — CYCLOBENZAPRINE HYDROCHLORIDE 10 MILLIGRAM(S): 10 TABLET, FILM COATED ORAL at 21:45

## 2022-07-18 RX ADMIN — Medication 25 MILLIGRAM(S): at 06:03

## 2022-07-18 NOTE — PROGRESS NOTE ADULT - SUBJECTIVE AND OBJECTIVE BOX
Patient is a 66y old  Female who presents with a chief complaint of Left leg/thigh pain. (18 Jul 2022 16:23)      Interval History: Fingersticks are still in the 300s.  Currently on increased dose of Lantus and lispro.  Creatinine has improved to 1.44.  Suspect infection and orthopedics work-up is on    MEDICATIONS  (STANDING):  atorvastatin Oral Tab/Cap - Peds 10 milliGRAM(s) Oral daily  cyclobenzaprine 10 milliGRAM(s) Oral every 8 hours  dextrose 5%. 1000 milliLiter(s) (50 mL/Hr) IV Continuous <Continuous>  dextrose 5%. 1000 milliLiter(s) (100 mL/Hr) IV Continuous <Continuous>  dextrose 5%. 1000 milliLiter(s) (50 mL/Hr) IV Continuous <Continuous>  dextrose 50% Injectable 25 Gram(s) IV Push once  dextrose 50% Injectable 25 Gram(s) IV Push once  dextrose 50% Injectable 12.5 Gram(s) IV Push once  dextrose 50% Injectable 25 Gram(s) IV Push once  ferrous    sulfate 325 milliGRAM(s) Oral two times a day  glucagon  Injectable 1 milliGRAM(s) IntraMuscular once  glucagon  Injectable 1 milliGRAM(s) IntraMuscular once  insulin glargine Injectable (LANTUS) 25 Unit(s) SubCutaneous at bedtime  insulin lispro (ADMELOG) corrective regimen sliding scale   SubCutaneous three times a day before meals  insulin lispro (ADMELOG) corrective regimen sliding scale   SubCutaneous at bedtime  insulin lispro Injectable (ADMELOG) 10 Unit(s) SubCutaneous three times a day before meals  metoprolol succinate ER 25 milliGRAM(s) Oral daily  montelukast 10 milliGRAM(s) Oral daily  naloxone Injectable 0.4 milliGRAM(s) IV Push once  pantoprazole    Tablet 40 milliGRAM(s) Oral before breakfast  polyethylene glycol 3350 17 Gram(s) Oral daily  senna 2 Tablet(s) Oral at bedtime  sodium chloride 0.9%. 1000 milliLiter(s) (75 mL/Hr) IV Continuous <Continuous>    MEDICATIONS  (PRN):  acetaminophen     Tablet .. 650 milliGRAM(s) Oral every 6 hours PRN Temp greater or equal to 38C (100.4F), Mild Pain (1 - 3)  benzocaine 15 mG/menthol 3.6 mG Lozenge 1 Lozenge Oral three times a day PRN Sore Throat  bisacodyl 5 milliGRAM(s) Oral every 12 hours PRN Constipation  dextrose Oral Gel 15 Gram(s) Oral once PRN Blood Glucose LESS THAN 70 milliGRAM(s)/deciliter  dextrose Oral Gel 15 Gram(s) Oral once PRN Blood Glucose LESS THAN 70 milliGRAM(s)/deciliter  HYDROmorphone  Injectable 1 milliGRAM(s) IV Push every 4 hours PRN Severe Pain (7 - 10)  magnesium hydroxide Suspension 30 milliLiter(s) Oral every 12 hours PRN Constipation  melatonin 3 milliGRAM(s) Oral at bedtime PRN Insomnia  ondansetron Injectable 4 milliGRAM(s) IV Push every 6 hours PRN Nausea and/or Vomiting  oxycodone    5 mG/acetaminophen 325 mG 2 Tablet(s) Oral every 4 hours PRN Moderate Pain (4 - 6)      Allergies    No Known Allergies    Intolerances        REVIEW OF SYSTEMS:  CONSTITUTIONAL: no changes  Could not be assessed     Vital Signs Last 24 Hrs  T(C): 36.8 (18 Jul 2022 16:27), Max: 37.7 (18 Jul 2022 05:36)  T(F): 98.3 (18 Jul 2022 16:27), Max: 99.8 (18 Jul 2022 05:36)  HR: 94 (18 Jul 2022 16:27) (80 - 114)  BP: 120/69 (18 Jul 2022 16:27) (105/59 - 136/69)  BP(mean): --  RR: 18 (18 Jul 2022 16:27) (17 - 20)  SpO2: 95% (18 Jul 2022 16:27) (95% - 100%)    Parameters below as of 18 Jul 2022 05:36  Patient On (Oxygen Delivery Method): room air        PHYSICAL EXAM:  GENERAL: stress  HEAD: Atraumatic, Normocephalic  EYES: PERRLA, conjunctiva and sclera clear  ENMT: No  exudates,; Moist mucous membranes,, No lesions  NECK: Supple, No JVD, Normal thyroid  s    LABS:        CAPILLARY BLOOD GLUCOSE      POCT Blood Glucose.: 345 mg/dL (18 Jul 2022 21:39)  POCT Blood Glucose.: 386 mg/dL (18 Jul 2022 15:58)  POCT Blood Glucose.: 259 mg/dL (18 Jul 2022 11:31)  POCT Blood Glucose.: 382 mg/dL (18 Jul 2022 07:46)  POCT Blood Glucose.: 396 mg/dL (17 Jul 2022 22:55)    Lipid panel:           Thyroid:  Diabetes Tests:  Parathyroid Panel:  Adrenals:  RADIOLOGY & ADDITIONAL TESTS:    Imaging Personally Reviewed:  [ ] YES  [ ] NO    Consultant(s) Notes Reviewed:  [ ] YES  [ ] NO    Care Discussed with Consultants/Other Providers [ ] YES  [ ] NO

## 2022-07-18 NOTE — PROGRESS NOTE ADULT - SUBJECTIVE AND OBJECTIVE BOX
[id number]  Patient seen and examined at bedside this AM. Patient appears more comfortable than prior days. Denies new weakness, numbness or tingling. Denies chest pain, shortness of breath, nausea or vomiting. Daughter at bedside updated and all questions addressed and answered.    LABS:                        11.7   15.26 )-----------( 288      ( 18 Jul 2022 05:00 )             34.2     07-18    129<L>  |  93<L>  |  26<H>  ----------------------------<  355<H>  4.6   |  27  |  1.44<H>    Ca    10.2<H>      18 Jul 2022 05:00            VITAL SIGNS:  T(C): 37.7 (07-18-22 @ 05:36), Max: 37.7 (07-18-22 @ 05:36)  HR: 114 (07-18-22 @ 05:36) (93 - 114)  BP: 136/69 (07-18-22 @ 05:36) (121/62 - 136/69)  RR: 20 (07-18-22 @ 05:36) (16 - 20)  SpO2: 100% (07-18-22 @ 05:36) (96% - 100%)      PE  General: awake, alert     Spine:  dressing CDI  +TTP over LSpine  + radial pulses  + DP Pulses    Motor:                   C5                C6              C7               C8           T1   R             5/5                5/5            5/5              5/5          5/5  L             5/5                5/5            5/5              5/5          5/5                    L2                  L3             L4              L5            S1  R            4/5                5/5             5/5            5/5          5/5  L             4/5                5/5            5/5            5/5          5/5    Sensory:            C5         C6         C7      C8       T1        (0=absent, 1=impaired, 2=normal, NT=not testable)  R         2            2           2        2         2  L          2            2           2        2         2               L2          L3         L4      L5       S1         (0=absent, 1=impaired, 2=normal, NT=not testable)  R         2            2            2        2        2  L          2            2           2        2         2                     A/P:  66y f s/p L4-5 decompression, TLIF POD 4, Stable     -Medical management appreciated  -PT/OT   -WBAT  -lumbosacral orthosis brace to be worn with ambulation/PT   -Postop PVR: 0; ?accuracy and timing with regards to patton removal  -PVR 7/16: 591, pt was straight cathed and drained 500cc  -Overnight 7/16 patient voided x2 300ml w/ rpt ; Voided 150 cc w/ rpt pvr 245  -Pain Control PRN  -DVT ppx - HOLD ALL CHEMICAL DVT PPX s/p spine surgery  -FU AM Labs  -Incentive Spirometry  -urology consulted, recs appreciated  -Risks v benefits of chronic patton discussed; Including need for close urology outpatient follow up for continued exchange and risk of UTI; Discussed possibility of bladder function not returning. Will monitor.    -family at bedside updated  -Bladder function appears to be gradually improving, albeit slowly; contractility improving based upon PVRs, however component of overflow incontinence could be contributing  -pt may require long term patton if bladder functions continues to not improve  -Continue with Bowel Regimen   -Will Discuss with attending Dr. Christopher and advise if any changes to plan  [id number]  Patient seen and examined at bedside this AM. Patient appears more comfortable than prior days. Denies new weakness, numbness or tingling. Denies chest pain, shortness of breath, nausea or vomiting. Daughter at bedside updated and all questions addressed and answered.    LABS:                        11.7   15.26 )-----------( 288      ( 18 Jul 2022 05:00 )             34.2     07-18    129<L>  |  93<L>  |  26<H>  ----------------------------<  355<H>  4.6   |  27  |  1.44<H>    Ca    10.2<H>      18 Jul 2022 05:00            VITAL SIGNS:  T(C): 37.7 (07-18-22 @ 05:36), Max: 37.7 (07-18-22 @ 05:36)  HR: 114 (07-18-22 @ 05:36) (93 - 114)  BP: 136/69 (07-18-22 @ 05:36) (121/62 - 136/69)  RR: 20 (07-18-22 @ 05:36) (16 - 20)  SpO2: 100% (07-18-22 @ 05:36) (96% - 100%)      PE  General: awake, alert     Spine:  dressing CDI  +TTP over LSpine  + radial pulses  + DP Pulses    Motor:                   C5                C6              C7               C8           T1   R             5/5                5/5            5/5              5/5          5/5  L             5/5                5/5            5/5              5/5          5/5                    L2                  L3             L4              L5            S1  R            4/5                5/5             5/5            5/5          5/5  L             4/5                5/5            5/5            5/5          5/5    Sensory:            C5         C6         C7      C8       T1        (0=absent, 1=impaired, 2=normal, NT=not testable)  R         2            2           2        2         2  L          2            2           2        2         2               L2          L3         L4      L5       S1         (0=absent, 1=impaired, 2=normal, NT=not testable)  R         2            2            2        2        2  L          2            2           2        2         2                     A/P:  66y f s/p L4-5 decompression, TLIF POD 4, Stable     -Medical management appreciated  -PT/OT   -WBAT  -lumbosacral orthosis brace to be worn with ambulation/PT   -Postop PVR: 0; ?accuracy and timing with regards to patton removal  -PVR 7/16: 591, pt was straight cathed and drained 500cc  -Overnight 7/16 patient voided x2 300ml w/ rpt ; Voided 150 cc w/ rpt pvr 245  -Pain Control PRN  -DVT ppx - HOLD ALL CHEMICAL DVT PPX s/p spine surgery  -FU AM Labs  -Incentive Spirometry  -urology consulted, recs appreciated  -Risks v benefits of chronic patton discussed; Including need for close urology outpatient follow up for continued exchange and risk of UTI; Discussed possibility of bladder function not returning. Will monitor.    -Bladder function appears to be gradually improving, albeit slowly; contractility improving based upon PVRs, however component of overflow incontinence could be contributing  -pt may require long term patton if bladder functions continues to not improve  -f/u with urology outpatient for patton management and monitoring return of bladder function  -family at bedside updated  -Continue with Bowel Regimen   -Ortho stable for discharge  -Will Discuss with attending Dr. Christopher and advise if any changes to plan Patient seen and examined at bedside this AM. [employer]  Utilized [id number]. Patient appears more comfortable than prior days. Denies new weakness, numbness or tingling. Denies chest pain, shortness of breath, nausea or vomiting. Daughter at bedside updated and all questions addressed and answered to satisfaction.    LABS:                        11.7   15.26 )-----------( 288      ( 18 Jul 2022 05:00 )             34.2     07-18    129<L>  |  93<L>  |  26<H>  ----------------------------<  355<H>  4.6   |  27  |  1.44<H>    Ca    10.2<H>      18 Jul 2022 05:00            VITAL SIGNS:  T(C): 37.7 (07-18-22 @ 05:36), Max: 37.7 (07-18-22 @ 05:36)  HR: 114 (07-18-22 @ 05:36) (93 - 114)  BP: 136/69 (07-18-22 @ 05:36) (121/62 - 136/69)  RR: 20 (07-18-22 @ 05:36) (16 - 20)  SpO2: 100% (07-18-22 @ 05:36) (96% - 100%)      PE  General: awake, alert     Spine:  dressing CDI  +TTP over LSpine  + radial pulses  + DP Pulses    Motor:                   C5                C6              C7               C8           T1   R             5/5                5/5            5/5              5/5          5/5  L             5/5                5/5            5/5              5/5          5/5                    L2                  L3             L4              L5            S1  R            4/5                5/5             5/5            5/5          5/5  L             4/5                5/5            5/5            5/5          5/5    Sensory:            C5         C6         C7      C8       T1        (0=absent, 1=impaired, 2=normal, NT=not testable)  R         2            2           2        2         2  L          2            2           2        2         2               L2          L3         L4      L5       S1         (0=absent, 1=impaired, 2=normal, NT=not testable)  R         2            2            2        2        2  L          2            2           2        2         2                     A/P:  66y f s/p L4-5 decompression, TLIF POD 4    -Medical management appreciated  -PT/OT   -WBAT  -lumbosacral orthosis brace to be worn with ambulation/PT   -Postop PVR: 0; ?accuracy and timing with regards to patton removal  -PVR 7/16: 591, pt was straight cathed and drained 500cc  -Overnight 7/16 patient voided x2 300ml w/ rpt ; Voided 150 cc w/ rpt pvr 245  -BS am 7/17 approx 600 cc, patton placed to decompress bladder  -Pain Control PRN  -DVT ppx - HOLD ALL CHEMICAL DVT PPX s/p spine surgery  -FU AM Labs  -Incentive Spirometry  -urology consulted, recs appreciated  -Risks v benefits of chronic patton discussed; Including need for close urology outpatient follow up for continued exchange and risk of UTI; Discussed possibility of bladder function not returning. Will monitor.    -Bladder function appears to be gradually improving, albeit slowly; contractility improving based upon PVRs, however component of overflow incontinence could be contributing  -pt may require long term patton if bladder functions continues to not improve, family aware it could take months, discussed possibility of it not returning  -f/u with urology outpatient for patton management and monitoring return of bladder function  -family at bedside updated  -Continue with Bowel Regimen   -No further acute orthopaedic surgical intervention indicated  -Orthopaedically stable for d/c w/ outpatient followup   -Will Discuss with attending Dr. Christopher and advise if any changes to plan

## 2022-07-18 NOTE — PROGRESS NOTE ADULT - ASSESSMENT
66F Amharic speaking PMHx of HTN, HLD, DM 2  presenting with left hip pain x 1-2 days. Described as mild, achy, shooting pain down left buttocks to knee. Exacerbated w/ standing and position. Pain level is 8/10 in intensity, not getting better after multiple doses of pain meds in ER. Otherwise, the patient denies any neck pain, headaches, chest pain, shortness of breath, n/v/d, abdominal pain, recent illness, fevers, chills, recent spinal/back surgeries or procedures, bowel or bladder incontinence, bowel or bladder retention, constipation, IV drug use, known cancer history, recent weight loss, trauma, weakness, other subjective neurological deficits, numbness/tingling, dysuria, hematuria, rashes. Ct Abd/Pelvis and CT Lumbar Spine :Mild motion artifact in the abdomen. No evidence of acute abdominal or pelvic abnormality. No evidence of acute lumbar spine abnormality. Mild degenerative changes of the lower lumbar spine. US negative for DVT.     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   -CT Lumbar Spine: L3-4: Disc bulge slightly asymmetric to the left. Mild bilateral facet   arthropathy. Mild spinal canal stenosis and minimal left neural foraminal   narrowing.  L4-5: Moderate disc bulge, mild bilateral facet arthropathy and   thickening of ligamenta flava. Moderate to severe spinal canal stenosis.   Mild to moderate left greater than right neural foraminal narrowing.  L5-S1: Mild disc bulge. Moderate bilateral facet arthropathy.   Mild/moderate spinal canal stenosis. Mass effect on the traversing   bilateral S1 nerve roots. Mild bilateral neural foraminal narrowing.  Orthopedic on board   7/14 L4-5 decompression, TLIF   -cont w/ analgesics prn   -cont w/  lumosacral orthosis brace; MAIA on discharge     Acute Metabolic Encephalopathy   Presumed secondary to ?opioid  Head CT: no acute changes  will hold back on opioid therapy   cont to monitor mentation    DM II   HgA1c: 8.4% uncontrolled   cont w/  lantus and prandial insulin   cont w/ FS monitoring w/ insulin s/s coverage for now     Stage 3 chronic kidney disease.   avoid nephrotoxic medications, renal dose meds   cont to monitor renal function as needed     Hypertension   cont w/ Metoprolol.    Dyslipidemia.   cont w/ Atorvastatin.    DVTp: heparin   Disposition: MAIA     Harshil speaking; language line was used.   Plan discussed with Daughter at the bedside.  Son [place] [phone]

## 2022-07-18 NOTE — PROGRESS NOTE ADULT - SUBJECTIVE AND OBJECTIVE BOX
Patient is a 66y old  Female who presents with a chief complaint of Left leg/thigh pain. (18 Jul 2022 05:59)      INTERVAL HPI/ OVERNIGHT EVENTS: Pt was seen and examined at bedside today, No significant overnight events, pt continues to be lethargic and confused, more alert than yesterday. Pt unable to provide ROS      MEDICATIONS  (STANDING):  atorvastatin Oral Tab/Cap - Peds 10 milliGRAM(s) Oral daily  cyclobenzaprine 10 milliGRAM(s) Oral every 8 hours  dextrose 5%. 1000 milliLiter(s) (50 mL/Hr) IV Continuous <Continuous>  dextrose 5%. 1000 milliLiter(s) (100 mL/Hr) IV Continuous <Continuous>  dextrose 5%. 1000 milliLiter(s) (50 mL/Hr) IV Continuous <Continuous>  dextrose 50% Injectable 25 Gram(s) IV Push once  dextrose 50% Injectable 25 Gram(s) IV Push once  dextrose 50% Injectable 12.5 Gram(s) IV Push once  dextrose 50% Injectable 25 Gram(s) IV Push once  ferrous    sulfate 325 milliGRAM(s) Oral two times a day  glucagon  Injectable 1 milliGRAM(s) IntraMuscular once  glucagon  Injectable 1 milliGRAM(s) IntraMuscular once  insulin glargine Injectable (LANTUS) 25 Unit(s) SubCutaneous at bedtime  insulin lispro (ADMELOG) corrective regimen sliding scale   SubCutaneous three times a day before meals  insulin lispro (ADMELOG) corrective regimen sliding scale   SubCutaneous at bedtime  insulin lispro Injectable (ADMELOG) 10 Unit(s) SubCutaneous three times a day before meals  metoprolol succinate ER 25 milliGRAM(s) Oral daily  montelukast 10 milliGRAM(s) Oral daily  naloxone Injectable 0.4 milliGRAM(s) IV Push once  pantoprazole    Tablet 40 milliGRAM(s) Oral before breakfast  polyethylene glycol 3350 17 Gram(s) Oral daily  senna 2 Tablet(s) Oral at bedtime  sodium chloride 0.9%. 1000 milliLiter(s) (75 mL/Hr) IV Continuous <Continuous>    MEDICATIONS  (PRN):  acetaminophen     Tablet .. 650 milliGRAM(s) Oral every 6 hours PRN Temp greater or equal to 38C (100.4F), Mild Pain (1 - 3)  benzocaine 15 mG/menthol 3.6 mG Lozenge 1 Lozenge Oral three times a day PRN Sore Throat  bisacodyl 5 milliGRAM(s) Oral every 12 hours PRN Constipation  dextrose Oral Gel 15 Gram(s) Oral once PRN Blood Glucose LESS THAN 70 milliGRAM(s)/deciliter  dextrose Oral Gel 15 Gram(s) Oral once PRN Blood Glucose LESS THAN 70 milliGRAM(s)/deciliter  HYDROmorphone  Injectable 1 milliGRAM(s) IV Push every 4 hours PRN Severe Pain (7 - 10)  magnesium hydroxide Suspension 30 milliLiter(s) Oral every 12 hours PRN Constipation  melatonin 3 milliGRAM(s) Oral at bedtime PRN Insomnia  ondansetron Injectable 4 milliGRAM(s) IV Push every 6 hours PRN Nausea and/or Vomiting  oxycodone    5 mG/acetaminophen 325 mG 2 Tablet(s) Oral every 4 hours PRN Moderate Pain (4 - 6)      Allergies    No Known Allergies    Intolerances        REVIEW OF SYSTEMS:    Unable to examine due to [ x] Encephalopathy [ ] Advanced Dementia [ ] Expressive Aphasia [ ] Non-verbal patient          Vital Signs Last 24 Hrs  T(C): 36.8 (18 Jul 2022 12:38), Max: 37.7 (18 Jul 2022 05:36)  T(F): 98.2 (18 Jul 2022 12:38), Max: 99.8 (18 Jul 2022 05:36)  HR: 80 (18 Jul 2022 12:38) (80 - 114)  BP: 105/59 (18 Jul 2022 12:38) (105/59 - 136/69)  BP(mean): --  RR: 17 (18 Jul 2022 12:38) (17 - 20)  SpO2: 97% (18 Jul 2022 12:38) (96% - 100%)    Parameters below as of 18 Jul 2022 05:36  Patient On (Oxygen Delivery Method): room air        PHYSICAL EXAM:  GENERAL: NAD, well-developed, well-groomed  HEAD:  Atraumatic, Normocephalic  EYES: conjunctiva and sclera clear  ENMT: Moist mucous membranes  NECK: Supple, No JVD, Normal thyroid  CHEST/LUNG: Clear to Auscultation bilaterally; No rales, rhonchi, wheezing, or rubs  HEART: Regular rate and rhythm; No murmurs, rubs, or gallops  ABDOMEN: Soft, Nontender, Nondistended; Bowel sounds present  EXTREMITIES:  2+ Peripheral Pulses, No clubbing, cyanosis, or edema  SKIN: No rashes or lesions  NERVOUS SYSTEM: +Lethargic,  confused, unable to provide neuro exam     LABS:                        11.7   15.26 )-----------( 288      ( 18 Jul 2022 05:00 )             34.2     07-18    129<L>  |  93<L>  |  26<H>  ----------------------------<  355<H>  4.6   |  27  |  1.44<H>    Ca    10.2<H>      18 Jul 2022 05:00          CAPILLARY BLOOD GLUCOSE      POCT Blood Glucose.: 386 mg/dL (18 Jul 2022 15:58)  POCT Blood Glucose.: 259 mg/dL (18 Jul 2022 11:31)  POCT Blood Glucose.: 382 mg/dL (18 Jul 2022 07:46)  POCT Blood Glucose.: 396 mg/dL (17 Jul 2022 22:55)  POCT Blood Glucose.: 419 mg/dL (17 Jul 2022 21:20)  POCT Blood Glucose.: 436 mg/dL (17 Jul 2022 21:17)        Culture - Urine (collected 07-12-22)  Source: Clean Catch Clean Catch (Midstream)  Final Report (07-14-22):    <10,000 CFU/mL Normal Urogenital [place]        RADIOLOGY & ADDITIONAL TESTS:          Imaging Personally Reviewed:  [ ] YES  [ ] NO    Consultant(s) Notes Reviewed:  [ ] YES  [ ] NO    Care Discussed with Consultants/Other Providers [x ] YES  [ ] NO

## 2022-07-19 LAB
ANION GAP SERPL CALC-SCNC: 9 MMOL/L — SIGNIFICANT CHANGE UP (ref 5–17)
BASOPHILS # BLD AUTO: 0.03 K/UL — SIGNIFICANT CHANGE UP (ref 0–0.2)
BASOPHILS NFR BLD AUTO: 0.2 % — SIGNIFICANT CHANGE UP (ref 0–2)
BUN SERPL-MCNC: 26 MG/DL — HIGH (ref 7–23)
CALCIUM SERPL-MCNC: 9.1 MG/DL — SIGNIFICANT CHANGE UP (ref 8.5–10.1)
CHLORIDE SERPL-SCNC: 99 MMOL/L — SIGNIFICANT CHANGE UP (ref 96–108)
CO2 SERPL-SCNC: 25 MMOL/L — SIGNIFICANT CHANGE UP (ref 22–31)
CREAT SERPL-MCNC: 1.31 MG/DL — HIGH (ref 0.5–1.3)
EGFR: 45 ML/MIN/1.73M2 — LOW
EOSINOPHIL # BLD AUTO: 0.36 K/UL — SIGNIFICANT CHANGE UP (ref 0–0.5)
EOSINOPHIL NFR BLD AUTO: 2.8 % — SIGNIFICANT CHANGE UP (ref 0–6)
GLUCOSE BLDC GLUCOMTR-MCNC: 280 MG/DL — HIGH (ref 70–99)
GLUCOSE BLDC GLUCOMTR-MCNC: 364 MG/DL — HIGH (ref 70–99)
GLUCOSE BLDC GLUCOMTR-MCNC: 370 MG/DL — HIGH (ref 70–99)
GLUCOSE BLDC GLUCOMTR-MCNC: 410 MG/DL — HIGH (ref 70–99)
GLUCOSE SERPL-MCNC: 326 MG/DL — HIGH (ref 70–99)
HCT VFR BLD CALC: 31.6 % — LOW (ref 34.5–45)
HGB BLD-MCNC: 10.5 G/DL — LOW (ref 11.5–15.5)
IMM GRANULOCYTES NFR BLD AUTO: 0.9 % — SIGNIFICANT CHANGE UP (ref 0–1.5)
LYMPHOCYTES # BLD AUTO: 1.34 K/UL — SIGNIFICANT CHANGE UP (ref 1–3.3)
LYMPHOCYTES # BLD AUTO: 10.5 % — LOW (ref 13–44)
MCHC RBC-ENTMCNC: 29.1 PG — SIGNIFICANT CHANGE UP (ref 27–34)
MCHC RBC-ENTMCNC: 33.2 G/DL — SIGNIFICANT CHANGE UP (ref 32–36)
MCV RBC AUTO: 87.5 FL — SIGNIFICANT CHANGE UP (ref 80–100)
MONOCYTES # BLD AUTO: 0.73 K/UL — SIGNIFICANT CHANGE UP (ref 0–0.9)
MONOCYTES NFR BLD AUTO: 5.7 % — SIGNIFICANT CHANGE UP (ref 2–14)
NEUTROPHILS # BLD AUTO: 10.22 K/UL — HIGH (ref 1.8–7.4)
NEUTROPHILS NFR BLD AUTO: 79.9 % — HIGH (ref 43–77)
NRBC # BLD: 0 /100 WBCS — SIGNIFICANT CHANGE UP (ref 0–0)
PLATELET # BLD AUTO: 278 K/UL — SIGNIFICANT CHANGE UP (ref 150–400)
POTASSIUM SERPL-MCNC: 4.4 MMOL/L — SIGNIFICANT CHANGE UP (ref 3.5–5.3)
POTASSIUM SERPL-SCNC: 4.4 MMOL/L — SIGNIFICANT CHANGE UP (ref 3.5–5.3)
RBC # BLD: 3.61 M/UL — LOW (ref 3.8–5.2)
RBC # FLD: 12.8 % — SIGNIFICANT CHANGE UP (ref 10.3–14.5)
SODIUM SERPL-SCNC: 133 MMOL/L — LOW (ref 135–145)
WBC # BLD: 12.8 K/UL — HIGH (ref 3.8–10.5)
WBC # FLD AUTO: 12.8 K/UL — HIGH (ref 3.8–10.5)

## 2022-07-19 PROCEDURE — 99232 SBSQ HOSP IP/OBS MODERATE 35: CPT

## 2022-07-19 RX ORDER — INSULIN GLARGINE 100 [IU]/ML
30 INJECTION, SOLUTION SUBCUTANEOUS AT BEDTIME
Refills: 0 | Status: DISCONTINUED | OUTPATIENT
Start: 2022-07-19 | End: 2022-07-29

## 2022-07-19 RX ADMIN — HYDROMORPHONE HYDROCHLORIDE 1 MILLIGRAM(S): 2 INJECTION INTRAMUSCULAR; INTRAVENOUS; SUBCUTANEOUS at 17:37

## 2022-07-19 RX ADMIN — POLYETHYLENE GLYCOL 3350 17 GRAM(S): 17 POWDER, FOR SOLUTION ORAL at 13:08

## 2022-07-19 RX ADMIN — HYDROMORPHONE HYDROCHLORIDE 1 MILLIGRAM(S): 2 INJECTION INTRAMUSCULAR; INTRAVENOUS; SUBCUTANEOUS at 18:07

## 2022-07-19 RX ADMIN — Medication 325 MILLIGRAM(S): at 17:00

## 2022-07-19 RX ADMIN — SODIUM CHLORIDE 75 MILLILITER(S): 9 INJECTION INTRAMUSCULAR; INTRAVENOUS; SUBCUTANEOUS at 16:38

## 2022-07-19 RX ADMIN — INSULIN GLARGINE 30 UNIT(S): 100 INJECTION, SOLUTION SUBCUTANEOUS at 21:18

## 2022-07-19 RX ADMIN — Medication 6: at 15:51

## 2022-07-19 RX ADMIN — Medication 4: at 21:24

## 2022-07-19 RX ADMIN — Medication 10: at 11:26

## 2022-07-19 RX ADMIN — SENNA PLUS 2 TABLET(S): 8.6 TABLET ORAL at 21:17

## 2022-07-19 RX ADMIN — CYCLOBENZAPRINE HYDROCHLORIDE 10 MILLIGRAM(S): 10 TABLET, FILM COATED ORAL at 21:18

## 2022-07-19 RX ADMIN — Medication 10 UNIT(S): at 07:59

## 2022-07-19 RX ADMIN — Medication 10 UNIT(S): at 11:26

## 2022-07-19 RX ADMIN — CYCLOBENZAPRINE HYDROCHLORIDE 10 MILLIGRAM(S): 10 TABLET, FILM COATED ORAL at 13:23

## 2022-07-19 RX ADMIN — Medication 25 MILLIGRAM(S): at 05:17

## 2022-07-19 RX ADMIN — ATORVASTATIN CALCIUM 10 MILLIGRAM(S): 80 TABLET, FILM COATED ORAL at 11:31

## 2022-07-19 RX ADMIN — MONTELUKAST 10 MILLIGRAM(S): 4 TABLET, CHEWABLE ORAL at 11:27

## 2022-07-19 RX ADMIN — Medication 325 MILLIGRAM(S): at 05:17

## 2022-07-19 RX ADMIN — Medication 10 UNIT(S): at 15:51

## 2022-07-19 RX ADMIN — Medication 10: at 07:59

## 2022-07-19 RX ADMIN — SODIUM CHLORIDE 75 MILLILITER(S): 9 INJECTION INTRAMUSCULAR; INTRAVENOUS; SUBCUTANEOUS at 05:17

## 2022-07-19 RX ADMIN — CYCLOBENZAPRINE HYDROCHLORIDE 10 MILLIGRAM(S): 10 TABLET, FILM COATED ORAL at 05:17

## 2022-07-19 RX ADMIN — PANTOPRAZOLE SODIUM 40 MILLIGRAM(S): 20 TABLET, DELAYED RELEASE ORAL at 08:01

## 2022-07-19 RX ADMIN — Medication 650 MILLIGRAM(S): at 21:14

## 2022-07-19 NOTE — PROGRESS NOTE ADULT - SUBJECTIVE AND OBJECTIVE BOX
Patient is a 66y old  Female who presents with a chief complaint of Left leg/thigh pain. (19 Jul 2022 05:54)      INTERVAL HPI/ OVERNIGHT EVENTS: Pt was seen and examined at bedside today, No significant overnight events, pt admits to 6/10 spinal pain improvement since admission, she denies any acute complaints.      MEDICATIONS  (STANDING):  atorvastatin Oral Tab/Cap - Peds 10 milliGRAM(s) Oral daily  cyclobenzaprine 10 milliGRAM(s) Oral every 8 hours  dextrose 5%. 1000 milliLiter(s) (50 mL/Hr) IV Continuous <Continuous>  dextrose 5%. 1000 milliLiter(s) (100 mL/Hr) IV Continuous <Continuous>  dextrose 5%. 1000 milliLiter(s) (50 mL/Hr) IV Continuous <Continuous>  dextrose 50% Injectable 25 Gram(s) IV Push once  dextrose 50% Injectable 25 Gram(s) IV Push once  dextrose 50% Injectable 12.5 Gram(s) IV Push once  dextrose 50% Injectable 25 Gram(s) IV Push once  ferrous    sulfate 325 milliGRAM(s) Oral two times a day  glucagon  Injectable 1 milliGRAM(s) IntraMuscular once  glucagon  Injectable 1 milliGRAM(s) IntraMuscular once  insulin glargine Injectable (LANTUS) 30 Unit(s) SubCutaneous at bedtime  insulin lispro (ADMELOG) corrective regimen sliding scale   SubCutaneous three times a day before meals  insulin lispro (ADMELOG) corrective regimen sliding scale   SubCutaneous at bedtime  insulin lispro Injectable (ADMELOG) 10 Unit(s) SubCutaneous three times a day before meals  metoprolol succinate ER 25 milliGRAM(s) Oral daily  montelukast 10 milliGRAM(s) Oral daily  naloxone Injectable 0.4 milliGRAM(s) IV Push once  pantoprazole    Tablet 40 milliGRAM(s) Oral before breakfast  polyethylene glycol 3350 17 Gram(s) Oral daily  senna 2 Tablet(s) Oral at bedtime  sodium chloride 0.9%. 1000 milliLiter(s) (75 mL/Hr) IV Continuous <Continuous>    MEDICATIONS  (PRN):  acetaminophen     Tablet .. 650 milliGRAM(s) Oral every 6 hours PRN Temp greater or equal to 38C (100.4F), Mild Pain (1 - 3)  benzocaine 15 mG/menthol 3.6 mG Lozenge 1 Lozenge Oral three times a day PRN Sore Throat  bisacodyl 5 milliGRAM(s) Oral every 12 hours PRN Constipation  dextrose Oral Gel 15 Gram(s) Oral once PRN Blood Glucose LESS THAN 70 milliGRAM(s)/deciliter  dextrose Oral Gel 15 Gram(s) Oral once PRN Blood Glucose LESS THAN 70 milliGRAM(s)/deciliter  HYDROmorphone  Injectable 1 milliGRAM(s) IV Push every 4 hours PRN Severe Pain (7 - 10)  magnesium hydroxide Suspension 30 milliLiter(s) Oral every 12 hours PRN Constipation  melatonin 3 milliGRAM(s) Oral at bedtime PRN Insomnia  ondansetron Injectable 4 milliGRAM(s) IV Push every 6 hours PRN Nausea and/or Vomiting  oxycodone    5 mG/acetaminophen 325 mG 2 Tablet(s) Oral every 4 hours PRN Moderate Pain (4 - 6)      Allergies    No Known Allergies    Intolerances        REVIEW OF SYSTEMS:    Unable to examine due to [ ] Encephalopathy [ ] Advanced Dementia [ ] Expressive Aphasia [ ] Non-verbal patient    CONSTITUTIONAL: No fever, NO generalized weakness/Fatigue, No weight loss  EYES: No eye pain, visual disturbances, or discharge  ENMT:  No difficulty hearing, tinnitus, vertigo; No sinus or throat pain  NECK: No pain or stiffness  RESPIRATORY: No shortness of breath,  cough, wheezing, sputum or hemoptysis   CARDIOVASCULAR: No chest pain, palpitations, or leg swelling  GASTROINTESTINAL:No abdominal pain. No nausea, vomiting, diarrhea or constipation. No melena or hematochezia.  NEUROLOGICAL: No headaches, Dizziness, memory loss, loss of strength, or tremors  SKIN: No itching, burning, rashes, or lesions   MUSCULOSKELETAL: lower back pain radiating to b/l feet  PSYCHIATRIC: No depression, anxiety, mood swings, or difficulty sleeping  HEME/LYMPH: No easy bruising, or bleeding gums      Vital Signs Last 24 Hrs  T(C): 36.8 (19 Jul 2022 13:25), Max: 37.3 (19 Jul 2022 11:53)  T(F): 98.2 (19 Jul 2022 13:25), Max: 99.2 (19 Jul 2022 11:53)  HR: 89 (19 Jul 2022 13:25) (86 - 95)  BP: 124/71 (19 Jul 2022 13:25) (117/68 - 124/71)  BP(mean): --  RR: 17 (19 Jul 2022 13:25) (17 - 18)  SpO2: 97% (19 Jul 2022 13:25) (95% - 97%)    Parameters below as of 19 Jul 2022 13:25  Patient On (Oxygen Delivery Method): room air        PHYSICAL EXAM:  GENERAL: NAD on RA,  well-developed, well-groomed  HEAD:  Atraumatic, Normocephalic  EYES: conjunctiva and sclera clear  ENMT: Moist mucous membranes  NECK: Supple, No JVD, Normal thyroid  CHEST/LUNG: Clear to Auscultation bilaterally; No rales, rhonchi, wheezing, or rubs  HEART: Regular rate and rhythm; No murmurs, rubs, or gallops  ABDOMEN: Soft, Nontender, Nondistended; Bowel sounds present  EXTREMITIES: 2+ Peripheral Pulses, No clubbing, cyanosis, or edema  SKIN: Lumbar surgical wound   NERVOUS SYSTEM:  Alert & Oriented X3, Good concentration; Motor Strength 5/5 B/L upper extremities, Lower extremity strength unable to be assess due to severe pain     LABS:                        10.5   12.80 )-----------( 278      ( 19 Jul 2022 07:38 )             31.6     07-19    133<L>  |  99  |  26<H>  ----------------------------<  326<H>  4.4   |  25  |  1.31<H>    Ca    9.1      19 Jul 2022 07:38          CAPILLARY BLOOD GLUCOSE      POCT Blood Glucose.: 370 mg/dL (19 Jul 2022 10:58)  POCT Blood Glucose.: 364 mg/dL (19 Jul 2022 07:48)  POCT Blood Glucose.: 345 mg/dL (18 Jul 2022 21:39)  POCT Blood Glucose.: 386 mg/dL (18 Jul 2022 15:58)        Culture - Urine (collected 07-12-22)  Source: Clean Catch Clean Catch (Midstream)  Final Report (07-14-22):    <10,000 CFU/mL Normal Urogenital [place]ille        RADIOLOGY & ADDITIONAL TESTS:          Imaging Personally Reviewed:  [ ] YES  [ ] NO    Consultant(s) Notes Reviewed:  [ ] YES  [ ] NO    Care Discussed with Consultants/Other Providers [x ] YES  [ ] NO

## 2022-07-19 NOTE — PROGRESS NOTE ADULT - SUBJECTIVE AND OBJECTIVE BOX
Patient seen and examined at bedside this AM. Patient appears to be resting comfortably, however endorses back pain with SLR and certain movements. Denies new weakness, numbness or tingling. Denies chest pain, shortness of breath, nausea or vomiting.    LABS:                        11.7   15.26 )-----------( 288      ( 18 Jul 2022 05:00 )             34.2     07-18    129<L>  |  93<L>  |  26<H>  ----------------------------<  355<H>  4.6   |  27  |  1.44<H>    Ca    10.2<H>      18 Jul 2022 05:00            VITAL SIGNS:  T(C): 36.4 (07-19-22 @ 05:11), Max: 36.8 (07-18-22 @ 12:38)  HR: 86 (07-19-22 @ 05:11) (80 - 94)  BP: 121/73 (07-19-22 @ 05:11) (105/59 - 121/73)  RR: 18 (07-19-22 @ 05:11) (17 - 18)  SpO2: 96% (07-19-22 @ 05:11) (95% - 97%)    PE  General: awake, alert     Spine:  dressing CDI  +TTP over LSpine  + radial pulses  + DP Pulses    Motor:                   C5                C6              C7               C8           T1   R             5/5                5/5            5/5              5/5          5/5  L             5/5                5/5            5/5              5/5          5/5                    L2                  L3             L4              L5            S1  R            4/5                5/5             5/5            5/5          5/5  L             4/5                5/5            5/5            5/5          5/5    Sensory:            C5         C6         C7      C8       T1        (0=absent, 1=impaired, 2=normal, NT=not testable)  R         2            2           2        2         2  L          2            2           2        2         2               L2          L3         L4      L5       S1         (0=absent, 1=impaired, 2=normal, NT=not testable)  R         2            2            2        2        2  L          2            2           2        2         2        A/P:  66y F s/p L4-5 decompression, TLIF POD 5    -Medical management appreciated  -PT/OT   -WBAT  -lumbosacral orthosis brace to be worn with ambulation/PT   -Postop PVR: 0; ?accuracy and timing with regards to patton removal  -PVR 7/16: 591, pt was straight cathed and drained 500cc  -Overnight 7/16 patient voided x2 300ml w/ rpt ; Voided 150 cc w/ rpt pvr 245  -BS am 7/17 approx 600 cc, patton placed to decompress bladder  -Pain Control PRN  -DVT ppx - HOLD ALL CHEMICAL DVT PPX s/p spine surgery  -FU AM Labs  -Incentive Spirometry  -urology consulted, recs appreciated  -Risks v benefits of chronic patton discussed; Including need for close urology outpatient follow up for continued exchange and risk of UTI; Discussed possibility of bladder function not returning. Will monitor.    -Bladder function appears to be gradually improving, albeit slowly; contractility improving based upon PVRs, however component of overflow incontinence could be contributing  -pt may require long term patton if bladder functions continues to not improve, family aware it could take months, discussed possibility of it not returning  -f/u with urology outpatient for patton management and monitoring return of bladder function  -Continue with Bowel Regimen   -No further acute orthopaedic surgical intervention indicated  -Orthopaedically stable for d/c w/ outpatient followup   -Will Discuss with attending Dr. Christopher and advise if any changes to plan

## 2022-07-19 NOTE — PROGRESS NOTE ADULT - ASSESSMENT
66F Portuguese speaking PMHx of HTN, HLD, DM 2  presenting with left hip pain x 1-2 days. Described as mild, achy, shooting pain down left buttocks to knee. Exacerbated w/ standing and position. Pain level is 8/10 in intensity, not getting better after multiple doses of pain meds in ER. Otherwise, the patient denies any neck pain, headaches, chest pain, shortness of breath, n/v/d, abdominal pain, recent illness, fevers, chills, recent spinal/back surgeries or procedures, bowel or bladder incontinence, bowel or bladder retention, constipation, IV drug use, known cancer history, recent weight loss, trauma, weakness, other subjective neurological deficits, numbness/tingling, dysuria, hematuria, rashes. Ct Abd/Pelvis and CT Lumbar Spine :Mild motion artifact in the abdomen. No evidence of acute abdominal or pelvic abnormality. No evidence of acute lumbar spine abnormality. Mild degenerative changes of the lower lumbar spine. US negative for DVT.     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   -CT Lumbar Spine: L3-4: Disc bulge slightly asymmetric to the left. Mild bilateral facet   arthropathy. Mild spinal canal stenosis and minimal left neural foraminal   narrowing.  L4-5: Moderate disc bulge, mild bilateral facet arthropathy and   thickening of ligamenta flava. Moderate to severe spinal canal stenosis.   Mild to moderate left greater than right neural foraminal narrowing.  L5-S1: Mild disc bulge. Moderate bilateral facet arthropathy.   Mild/moderate spinal canal stenosis. Mass effect on the traversing   bilateral S1 nerve roots. Mild bilateral neural foraminal narrowing.  Orthopedic on board   7/14 L4-5 decompression, TLIF   -cont w/ analgesics prn   -cont w/  lumosacral orthosis brace; MAIA on discharge     Acute Toxic Metabolic Encephalopathy   secondary to opioid  Head CT: no acute changes  resolved w/ holding Morphine   cont to monitor mentation w/ opioid use     DM II   HgA1c: 8.4% uncontrolled   cont w/  lantus and prandial insulin   cont w/ FS monitoring w/ insulin s/s coverage for now     Stage 3 chronic kidney disease.   avoid nephrotoxic medications, renal dose meds   cont to monitor renal function as needed     Hypertension   cont w/ Metoprolol.    Dyslipidemia.   cont w/ Atorvastatin.    DVTp: heparin   Disposition: Medically clear; pending MAIA acceptance     [place]het[place] speaking; language line was used.   Son Moy [phone]

## 2022-07-19 NOTE — PROGRESS NOTE ADULT - SUBJECTIVE AND OBJECTIVE BOX
Patient is a 66y old  Female who presents with a chief complaint of Left leg/thigh pain. (19 Jul 2022 15:11)      Interval History: GFR now increased to 45   on Lantus 30 units and prandial lispro 10 units and finger sticks are still in 300s  increased Insulin Resistance     MEDICATIONS  (STANDING):  atorvastatin Oral Tab/Cap - Peds 10 milliGRAM(s) Oral daily  cyclobenzaprine 10 milliGRAM(s) Oral every 8 hours  dextrose 5%. 1000 milliLiter(s) (50 mL/Hr) IV Continuous <Continuous>  dextrose 5%. 1000 milliLiter(s) (100 mL/Hr) IV Continuous <Continuous>  dextrose 5%. 1000 milliLiter(s) (50 mL/Hr) IV Continuous <Continuous>  dextrose 50% Injectable 25 Gram(s) IV Push once  dextrose 50% Injectable 25 Gram(s) IV Push once  dextrose 50% Injectable 12.5 Gram(s) IV Push once  dextrose 50% Injectable 25 Gram(s) IV Push once  ferrous    sulfate 325 milliGRAM(s) Oral two times a day  glucagon  Injectable 1 milliGRAM(s) IntraMuscular once  glucagon  Injectable 1 milliGRAM(s) IntraMuscular once  insulin glargine Injectable (LANTUS) 30 Unit(s) SubCutaneous at bedtime  insulin lispro (ADMELOG) corrective regimen sliding scale   SubCutaneous three times a day before meals  insulin lispro (ADMELOG) corrective regimen sliding scale   SubCutaneous at bedtime  insulin lispro Injectable (ADMELOG) 10 Unit(s) SubCutaneous three times a day before meals  metoprolol succinate ER 25 milliGRAM(s) Oral daily  montelukast 10 milliGRAM(s) Oral daily  naloxone Injectable 0.4 milliGRAM(s) IV Push once  pantoprazole    Tablet 40 milliGRAM(s) Oral before breakfast  polyethylene glycol 3350 17 Gram(s) Oral daily  senna 2 Tablet(s) Oral at bedtime  sodium chloride 0.9%. 1000 milliLiter(s) (75 mL/Hr) IV Continuous <Continuous>    MEDICATIONS  (PRN):  acetaminophen     Tablet .. 650 milliGRAM(s) Oral every 6 hours PRN Temp greater or equal to 38C (100.4F), Mild Pain (1 - 3)  benzocaine 15 mG/menthol 3.6 mG Lozenge 1 Lozenge Oral three times a day PRN Sore Throat  bisacodyl 5 milliGRAM(s) Oral every 12 hours PRN Constipation  dextrose Oral Gel 15 Gram(s) Oral once PRN Blood Glucose LESS THAN 70 milliGRAM(s)/deciliter  dextrose Oral Gel 15 Gram(s) Oral once PRN Blood Glucose LESS THAN 70 milliGRAM(s)/deciliter  HYDROmorphone  Injectable 1 milliGRAM(s) IV Push every 4 hours PRN Severe Pain (7 - 10)  magnesium hydroxide Suspension 30 milliLiter(s) Oral every 12 hours PRN Constipation  melatonin 3 milliGRAM(s) Oral at bedtime PRN Insomnia  ondansetron Injectable 4 milliGRAM(s) IV Push every 6 hours PRN Nausea and/or Vomiting  oxycodone    5 mG/acetaminophen 325 mG 2 Tablet(s) Oral every 4 hours PRN Moderate Pain (4 - 6)      Allergies    No Known Allergies    Intolerances        REVIEW OF SYSTEMS:  CONSTITUTIONAL: no changes  EYES: No eye pain, visual disturbances, or discharge  ENMT:  No difficulty hearing, No sinus or throat pain  NECK: No pain or stiffness  RESPIRATORY: No cough, wheezing, chills or hemoptysis; No shortness of breath  CARDIOVASCULAR: No chest pain, palpitations or leg swelling  GASTROINTESTINAL: No abdominal or epigastric pain. No nausea, vomiting, or hematemesis; No diarrhea or constipation. No melena or hematochezia.  GENITOURINARY: No dysuria, frequency, hematuria, or incontinence  NEUROLOGICAL: No headaches, memory loss, loss of strength, numbness, or tremors  SKIN: No itching, burning, rashes, or lesions   ENDOCRINE: No heat or cold intolerance; No hair loss  MUSCULOSKELETAL: No joint pain or swelling; No muscle, back, or extremity pain  PSYCHIATRIC: No depression, anxiety, mood swings, or difficulty sleeping  HEME/LYMPH: No easy bruising, or bleeding gums  ALLERY AND IMMUNOLOGIC: No hives or eczema    Vital Signs Last 24 Hrs  T(C): 36.8 (19 Jul 2022 13:25), Max: 37.3 (19 Jul 2022 11:53)  T(F): 98.2 (19 Jul 2022 13:25), Max: 99.2 (19 Jul 2022 11:53)  HR: 89 (19 Jul 2022 13:25) (86 - 95)  BP: 124/71 (19 Jul 2022 13:25) (117/68 - 124/71)  BP(mean): --  RR: 17 (19 Jul 2022 13:25) (17 - 18)  SpO2: 97% (19 Jul 2022 13:25) (95% - 97%)    Parameters below as of 19 Jul 2022 13:25  Patient On (Oxygen Delivery Method): room air        PHYSICAL EXAM:  GENERAL:   HEAD: Atraumatic, Normocephalic  EYES: PERRLA, conjunctiva and sclera clear  ENMT: No  exudates,; Moist mucous membranes,, No lesions  NECK: Supple, No JVD, Normal thyroid  NERVOUS SYSTEM:  Alert & Oriented,   CHEST/LUNG: Clear to auscultation bilaterally; No rales, rhonchi, wheezing, or rubs  HEART: Regular rate and rhythm; No murmurs, rubs, or gallops  ABDOMEN: Soft, Nontender, Nondistended; Bowel sounds present  EXTREMITIES:  2+ Peripheral Pulses, no edema  SKIN: No rashes or lesions    LABS:        CAPILLARY BLOOD GLUCOSE      POCT Blood Glucose.: 280 mg/dL (19 Jul 2022 15:40)  POCT Blood Glucose.: 370 mg/dL (19 Jul 2022 10:58)  POCT Blood Glucose.: 364 mg/dL (19 Jul 2022 07:48)  POCT Blood Glucose.: 345 mg/dL (18 Jul 2022 21:39)    Lipid panel:           Thyroid:  Diabetes Tests:  Parathyroid Panel:  Adrenals:  RADIOLOGY & ADDITIONAL TESTS:    Imaging Personally Reviewed:  [ ] YES  [ ] NO    Consultant(s) Notes Reviewed:  [ ] YES  [ ] NO    Care Discussed with Consultants/Other Providers [ ] YES  [ ] NO

## 2022-07-20 LAB
ANION GAP SERPL CALC-SCNC: 9 MMOL/L — SIGNIFICANT CHANGE UP (ref 5–17)
BASOPHILS # BLD AUTO: 0.03 K/UL — SIGNIFICANT CHANGE UP (ref 0–0.2)
BASOPHILS NFR BLD AUTO: 0.3 % — SIGNIFICANT CHANGE UP (ref 0–2)
BUN SERPL-MCNC: 19 MG/DL — SIGNIFICANT CHANGE UP (ref 7–23)
CALCIUM SERPL-MCNC: 8 MG/DL — LOW (ref 8.5–10.1)
CHLORIDE SERPL-SCNC: 106 MMOL/L — SIGNIFICANT CHANGE UP (ref 96–108)
CO2 SERPL-SCNC: 22 MMOL/L — SIGNIFICANT CHANGE UP (ref 22–31)
CREAT SERPL-MCNC: 1.08 MG/DL — SIGNIFICANT CHANGE UP (ref 0.5–1.3)
EGFR: 57 ML/MIN/1.73M2 — LOW
EOSINOPHIL # BLD AUTO: 0.33 K/UL — SIGNIFICANT CHANGE UP (ref 0–0.5)
EOSINOPHIL NFR BLD AUTO: 3.7 % — SIGNIFICANT CHANGE UP (ref 0–6)
FLUAV AG NPH QL: SIGNIFICANT CHANGE UP
FLUBV AG NPH QL: SIGNIFICANT CHANGE UP
GLUCOSE BLDC GLUCOMTR-MCNC: 249 MG/DL — HIGH (ref 70–99)
GLUCOSE BLDC GLUCOMTR-MCNC: 261 MG/DL — HIGH (ref 70–99)
GLUCOSE BLDC GLUCOMTR-MCNC: 281 MG/DL — HIGH (ref 70–99)
GLUCOSE BLDC GLUCOMTR-MCNC: 371 MG/DL — HIGH (ref 70–99)
GLUCOSE SERPL-MCNC: 278 MG/DL — HIGH (ref 70–99)
HCT VFR BLD CALC: 29.8 % — LOW (ref 34.5–45)
HGB BLD-MCNC: 10.1 G/DL — LOW (ref 11.5–15.5)
IMM GRANULOCYTES NFR BLD AUTO: 0.9 % — SIGNIFICANT CHANGE UP (ref 0–1.5)
LYMPHOCYTES # BLD AUTO: 1.34 K/UL — SIGNIFICANT CHANGE UP (ref 1–3.3)
LYMPHOCYTES # BLD AUTO: 15.1 % — SIGNIFICANT CHANGE UP (ref 13–44)
MAGNESIUM SERPL-MCNC: 2.1 MG/DL — SIGNIFICANT CHANGE UP (ref 1.6–2.6)
MCHC RBC-ENTMCNC: 29.4 PG — SIGNIFICANT CHANGE UP (ref 27–34)
MCHC RBC-ENTMCNC: 33.9 G/DL — SIGNIFICANT CHANGE UP (ref 32–36)
MCV RBC AUTO: 86.9 FL — SIGNIFICANT CHANGE UP (ref 80–100)
MONOCYTES # BLD AUTO: 0.71 K/UL — SIGNIFICANT CHANGE UP (ref 0–0.9)
MONOCYTES NFR BLD AUTO: 8 % — SIGNIFICANT CHANGE UP (ref 2–14)
NEUTROPHILS # BLD AUTO: 6.39 K/UL — SIGNIFICANT CHANGE UP (ref 1.8–7.4)
NEUTROPHILS NFR BLD AUTO: 72 % — SIGNIFICANT CHANGE UP (ref 43–77)
NRBC # BLD: 0 /100 WBCS — SIGNIFICANT CHANGE UP (ref 0–0)
PHOSPHATE SERPL-MCNC: 2.5 MG/DL — SIGNIFICANT CHANGE UP (ref 2.5–4.5)
PLATELET # BLD AUTO: 293 K/UL — SIGNIFICANT CHANGE UP (ref 150–400)
POTASSIUM SERPL-MCNC: 3.7 MMOL/L — SIGNIFICANT CHANGE UP (ref 3.5–5.3)
POTASSIUM SERPL-SCNC: 3.7 MMOL/L — SIGNIFICANT CHANGE UP (ref 3.5–5.3)
RBC # BLD: 3.43 M/UL — LOW (ref 3.8–5.2)
RBC # FLD: 12.8 % — SIGNIFICANT CHANGE UP (ref 10.3–14.5)
SARS-COV-2 RNA SPEC QL NAA+PROBE: SIGNIFICANT CHANGE UP
SODIUM SERPL-SCNC: 137 MMOL/L — SIGNIFICANT CHANGE UP (ref 135–145)
WBC # BLD: 8.88 K/UL — SIGNIFICANT CHANGE UP (ref 3.8–10.5)
WBC # FLD AUTO: 8.88 K/UL — SIGNIFICANT CHANGE UP (ref 3.8–10.5)

## 2022-07-20 PROCEDURE — 99232 SBSQ HOSP IP/OBS MODERATE 35: CPT

## 2022-07-20 RX ORDER — LIDOCAINE 4 G/100G
15 CREAM TOPICAL
Refills: 0 | Status: COMPLETED | OUTPATIENT
Start: 2022-07-20 | End: 2022-07-23

## 2022-07-20 RX ORDER — HYDROMORPHONE HYDROCHLORIDE 2 MG/ML
0.5 INJECTION INTRAMUSCULAR; INTRAVENOUS; SUBCUTANEOUS ONCE
Refills: 0 | Status: DISCONTINUED | OUTPATIENT
Start: 2022-07-20 | End: 2022-07-20

## 2022-07-20 RX ORDER — SALICYLIC ACID 0.5 %
1 CLEANSER (GRAM) TOPICAL
Refills: 0 | Status: DISCONTINUED | OUTPATIENT
Start: 2022-07-20 | End: 2022-07-29

## 2022-07-20 RX ADMIN — Medication 10: at 11:35

## 2022-07-20 RX ADMIN — MONTELUKAST 10 MILLIGRAM(S): 4 TABLET, CHEWABLE ORAL at 11:24

## 2022-07-20 RX ADMIN — CYCLOBENZAPRINE HYDROCHLORIDE 10 MILLIGRAM(S): 10 TABLET, FILM COATED ORAL at 21:52

## 2022-07-20 RX ADMIN — Medication 325 MILLIGRAM(S): at 05:17

## 2022-07-20 RX ADMIN — Medication 4: at 17:10

## 2022-07-20 RX ADMIN — CYCLOBENZAPRINE HYDROCHLORIDE 10 MILLIGRAM(S): 10 TABLET, FILM COATED ORAL at 05:18

## 2022-07-20 RX ADMIN — Medication 6: at 07:54

## 2022-07-20 RX ADMIN — Medication 10 UNIT(S): at 07:54

## 2022-07-20 RX ADMIN — HYDROMORPHONE HYDROCHLORIDE 1 MILLIGRAM(S): 2 INJECTION INTRAMUSCULAR; INTRAVENOUS; SUBCUTANEOUS at 16:08

## 2022-07-20 RX ADMIN — LIDOCAINE 15 MILLILITER(S): 4 CREAM TOPICAL at 17:07

## 2022-07-20 RX ADMIN — Medication 325 MILLIGRAM(S): at 17:09

## 2022-07-20 RX ADMIN — SENNA PLUS 2 TABLET(S): 8.6 TABLET ORAL at 21:51

## 2022-07-20 RX ADMIN — Medication 10 UNIT(S): at 11:35

## 2022-07-20 RX ADMIN — Medication 1 APPLICATION(S): at 17:06

## 2022-07-20 RX ADMIN — ATORVASTATIN CALCIUM 10 MILLIGRAM(S): 80 TABLET, FILM COATED ORAL at 11:24

## 2022-07-20 RX ADMIN — HYDROMORPHONE HYDROCHLORIDE 1 MILLIGRAM(S): 2 INJECTION INTRAMUSCULAR; INTRAVENOUS; SUBCUTANEOUS at 16:38

## 2022-07-20 RX ADMIN — POLYETHYLENE GLYCOL 3350 17 GRAM(S): 17 POWDER, FOR SOLUTION ORAL at 11:24

## 2022-07-20 RX ADMIN — Medication 25 MILLIGRAM(S): at 05:17

## 2022-07-20 RX ADMIN — HYDROMORPHONE HYDROCHLORIDE 0.5 MILLIGRAM(S): 2 INJECTION INTRAMUSCULAR; INTRAVENOUS; SUBCUTANEOUS at 18:17

## 2022-07-20 RX ADMIN — CYCLOBENZAPRINE HYDROCHLORIDE 10 MILLIGRAM(S): 10 TABLET, FILM COATED ORAL at 15:47

## 2022-07-20 RX ADMIN — HYDROMORPHONE HYDROCHLORIDE 0.5 MILLIGRAM(S): 2 INJECTION INTRAMUSCULAR; INTRAVENOUS; SUBCUTANEOUS at 18:47

## 2022-07-20 RX ADMIN — Medication 1: at 21:51

## 2022-07-20 RX ADMIN — Medication 10 UNIT(S): at 17:10

## 2022-07-20 RX ADMIN — SODIUM CHLORIDE 75 MILLILITER(S): 9 INJECTION INTRAMUSCULAR; INTRAVENOUS; SUBCUTANEOUS at 08:49

## 2022-07-20 RX ADMIN — INSULIN GLARGINE 30 UNIT(S): 100 INJECTION, SOLUTION SUBCUTANEOUS at 21:51

## 2022-07-20 RX ADMIN — Medication 650 MILLIGRAM(S): at 01:44

## 2022-07-20 RX ADMIN — PANTOPRAZOLE SODIUM 40 MILLIGRAM(S): 20 TABLET, DELAYED RELEASE ORAL at 07:53

## 2022-07-20 NOTE — PROGRESS NOTE ADULT - SUBJECTIVE AND OBJECTIVE BOX
Patient is a 66y old  Female who presents with a chief complaint of Left leg/thigh pain. (19 Jul 2022 05:54)      INTERVAL HPI/ OVERNIGHT EVENTS: Pt was seen and examined at bedside today,    MEDICATIONS  (STANDING):  atorvastatin Oral Tab/Cap - Peds 10 milliGRAM(s) Oral daily  cyclobenzaprine 10 milliGRAM(s) Oral every 8 hours  dextrose 5%. 1000 milliLiter(s) (50 mL/Hr) IV Continuous <Continuous>  dextrose 5%. 1000 milliLiter(s) (100 mL/Hr) IV Continuous <Continuous>  dextrose 5%. 1000 milliLiter(s) (50 mL/Hr) IV Continuous <Continuous>  dextrose 50% Injectable 25 Gram(s) IV Push once  dextrose 50% Injectable 25 Gram(s) IV Push once  dextrose 50% Injectable 12.5 Gram(s) IV Push once  dextrose 50% Injectable 25 Gram(s) IV Push once  ferrous    sulfate 325 milliGRAM(s) Oral two times a day  glucagon  Injectable 1 milliGRAM(s) IntraMuscular once  glucagon  Injectable 1 milliGRAM(s) IntraMuscular once  insulin glargine Injectable (LANTUS) 30 Unit(s) SubCutaneous at bedtime  insulin lispro (ADMELOG) corrective regimen sliding scale   SubCutaneous three times a day before meals  insulin lispro (ADMELOG) corrective regimen sliding scale   SubCutaneous at bedtime  insulin lispro Injectable (ADMELOG) 10 Unit(s) SubCutaneous three times a day before meals  metoprolol succinate ER 25 milliGRAM(s) Oral daily  montelukast 10 milliGRAM(s) Oral daily  naloxone Injectable 0.4 milliGRAM(s) IV Push once  pantoprazole    Tablet 40 milliGRAM(s) Oral before breakfast  polyethylene glycol 3350 17 Gram(s) Oral daily  senna 2 Tablet(s) Oral at bedtime  sodium chloride 0.9%. 1000 milliLiter(s) (75 mL/Hr) IV Continuous <Continuous>    MEDICATIONS  (PRN):  acetaminophen     Tablet .. 650 milliGRAM(s) Oral every 6 hours PRN Temp greater or equal to 38C (100.4F), Mild Pain (1 - 3)  benzocaine 15 mG/menthol 3.6 mG Lozenge 1 Lozenge Oral three times a day PRN Sore Throat  bisacodyl 5 milliGRAM(s) Oral every 12 hours PRN Constipation  dextrose Oral Gel 15 Gram(s) Oral once PRN Blood Glucose LESS THAN 70 milliGRAM(s)/deciliter  dextrose Oral Gel 15 Gram(s) Oral once PRN Blood Glucose LESS THAN 70 milliGRAM(s)/deciliter  HYDROmorphone  Injectable 1 milliGRAM(s) IV Push every 4 hours PRN Severe Pain (7 - 10)  magnesium hydroxide Suspension 30 milliLiter(s) Oral every 12 hours PRN Constipation  melatonin 3 milliGRAM(s) Oral at bedtime PRN Insomnia  ondansetron Injectable 4 milliGRAM(s) IV Push every 6 hours PRN Nausea and/or Vomiting  oxycodone    5 mG/acetaminophen 325 mG 2 Tablet(s) Oral every 4 hours PRN Moderate Pain (4 - 6)      Allergies    No Known Allergies    Intolerances    Vital Signs Last 24 Hrs  T(C): 36.8 (20 Jul 2022 12:21), Max: 37 (19 Jul 2022 17:30)  T(F): 98.3 (20 Jul 2022 12:21), Max: 98.6 (19 Jul 2022 17:30)  HR: 88 (20 Jul 2022 12:21) (83 - 94)  BP: 139/75 (20 Jul 2022 12:21) (124/71 - 145/84)  BP(mean): --  RR: 17 (20 Jul 2022 12:21) (16 - 18)  SpO2: 99% (20 Jul 2022 12:21) (97% - 99%)    Parameters below as of 20 Jul 2022 05:07  Patient On (Oxygen Delivery Method): room air        PHYSICAL EXAM:  GENERAL: NAD on RA,  well-developed, well-groomed  HEAD:  Atraumatic, Normocephalic  EYES: conjunctiva and sclera clear  ENMT: Moist mucous membranes  NECK: Supple,   CHEST/LUNG: Clear to Auscultation bilaterally; No rales, rhonchi, wheezing, or rubs  HEART: Regular rate and rhythm; No murmurs, rubs, or gallops  ABDOMEN: Soft, Nontender, Nondistended; Bowel sounds present  EXTREMITIES: 2+ Peripheral Pulses, No clubbing, cyanosis, or edema  SKIN: Lumbar surgical wound   NERVOUS SYSTEM:  Alert & Oriented X3, Good concentration; Motor Strength 4/5 B/L upper extremities, Lower extremity strength unable to be assess due to severe pain     LABS:                                        10.1   8.88  )-----------( 293      ( 20 Jul 2022 07:15 )             29.8   07-20    137  |  106  |  19  ----------------------------<  278<H>  3.7   |  22  |  1.08    Ca    8.0<L>      20 Jul 2022 07:15  Phos  2.5     07-20  Mg     2.1     07-20          CAPILLARY BLOOD GLUCOSE  CAPILLARY BLOOD GLUCOSE      POCT Blood Glucose.: 371 mg/dL (20 Jul 2022 11:34)  POCT Blood Glucose.: 281 mg/dL (20 Jul 2022 07:51)  POCT Blood Glucose.: 410 mg/dL (19 Jul 2022 21:04)  POCT Blood Glucose.: 280 mg/dL (19 Jul 2022 15:40)          Culture - Urine (collected 07-12-22)  Source: Clean Catch Clean Catch (Midstream)  Final Report (07-14-22):    <10,000 CFU/mL Normal Urogenital Mireille        RADIOLOGY & ADDITIONAL TESTS:          Imaging Personally Reviewed:  [ ] YES  [ ] NO    Consultant(s) Notes Reviewed:  [ ] YES  [ ] NO    Care Discussed with Consultants/Other Providers [x ] YES  [ ] NO Patient is a 66y old  Female who presents with a chief complaint of Left leg/thigh pain. (19 Jul 2022 05:54)    [place]  [id number]    INTERVAL HPI/ OVERNIGHT EVENTS: Pt was seen and examined at bedside today,    MEDICATIONS  (STANDING):  atorvastatin Oral Tab/Cap - Peds 10 milliGRAM(s) Oral daily  cyclobenzaprine 10 milliGRAM(s) Oral every 8 hours  dextrose 5%. 1000 milliLiter(s) (50 mL/Hr) IV Continuous <Continuous>  dextrose 5%. 1000 milliLiter(s) (100 mL/Hr) IV Continuous <Continuous>  dextrose 5%. 1000 milliLiter(s) (50 mL/Hr) IV Continuous <Continuous>  dextrose 50% Injectable 25 Gram(s) IV Push once  dextrose 50% Injectable 25 Gram(s) IV Push once  dextrose 50% Injectable 12.5 Gram(s) IV Push once  dextrose 50% Injectable 25 Gram(s) IV Push once  ferrous    sulfate 325 milliGRAM(s) Oral two times a day  glucagon  Injectable 1 milliGRAM(s) IntraMuscular once  glucagon  Injectable 1 milliGRAM(s) IntraMuscular once  insulin glargine Injectable (LANTUS) 30 Unit(s) SubCutaneous at bedtime  insulin lispro (ADMELOG) corrective regimen sliding scale   SubCutaneous three times a day before meals  insulin lispro (ADMELOG) corrective regimen sliding scale   SubCutaneous at bedtime  insulin lispro Injectable (ADMELOG) 10 Unit(s) SubCutaneous three times a day before meals  metoprolol succinate ER 25 milliGRAM(s) Oral daily  montelukast 10 milliGRAM(s) Oral daily  naloxone Injectable 0.4 milliGRAM(s) IV Push once  pantoprazole    Tablet 40 milliGRAM(s) Oral before breakfast  polyethylene glycol 3350 17 Gram(s) Oral daily  senna 2 Tablet(s) Oral at bedtime  sodium chloride 0.9%. 1000 milliLiter(s) (75 mL/Hr) IV Continuous <Continuous>    MEDICATIONS  (PRN):  acetaminophen     Tablet .. 650 milliGRAM(s) Oral every 6 hours PRN Temp greater or equal to 38C (100.4F), Mild Pain (1 - 3)  benzocaine 15 mG/menthol 3.6 mG Lozenge 1 Lozenge Oral three times a day PRN Sore Throat  bisacodyl 5 milliGRAM(s) Oral every 12 hours PRN Constipation  dextrose Oral Gel 15 Gram(s) Oral once PRN Blood Glucose LESS THAN 70 milliGRAM(s)/deciliter  dextrose Oral Gel 15 Gram(s) Oral once PRN Blood Glucose LESS THAN 70 milliGRAM(s)/deciliter  HYDROmorphone  Injectable 1 milliGRAM(s) IV Push every 4 hours PRN Severe Pain (7 - 10)  magnesium hydroxide Suspension 30 milliLiter(s) Oral every 12 hours PRN Constipation  melatonin 3 milliGRAM(s) Oral at bedtime PRN Insomnia  ondansetron Injectable 4 milliGRAM(s) IV Push every 6 hours PRN Nausea and/or Vomiting  oxycodone    5 mG/acetaminophen 325 mG 2 Tablet(s) Oral every 4 hours PRN Moderate Pain (4 - 6)      Allergies    No Known Allergies    Intolerances    Vital Signs Last 24 Hrs  T(C): 36.8 (20 Jul 2022 12:21), Max: 37 (19 Jul 2022 17:30)  T(F): 98.3 (20 Jul 2022 12:21), Max: 98.6 (19 Jul 2022 17:30)  HR: 88 (20 Jul 2022 12:21) (83 - 94)  BP: 139/75 (20 Jul 2022 12:21) (124/71 - 145/84)  BP(mean): --  RR: 17 (20 Jul 2022 12:21) (16 - 18)  SpO2: 99% (20 Jul 2022 12:21) (97% - 99%)    Parameters below as of 20 Jul 2022 05:07  Patient On (Oxygen Delivery Method): room air        PHYSICAL EXAM:  GENERAL: NAD on RA,  well-developed, well-groomed  HEAD:  Atraumatic, Normocephalic  EYES: conjunctiva and sclera clear  ENMT: Moist mucous membranes  NECK: Supple,   CHEST/LUNG: Clear to Auscultation bilaterally; No rales, rhonchi, wheezing, or rubs  HEART: Regular rate and rhythm; No murmurs, rubs, or gallops  ABDOMEN: Soft, Nontender, Nondistended; Bowel sounds present  EXTREMITIES: 2+ Peripheral Pulses, No clubbing, cyanosis, or edema  SKIN: Lumbar surgical wound   NERVOUS SYSTEM:  Alert & Oriented X3, Good concentration; Motor Strength 4/5 B/L upper extremities, Lower extremity strength unable to be assess due to severe pain     LABS:                                        10.1   8.88  )-----------( 293      ( 20 Jul 2022 07:15 )             29.8   07-20    137  |  106  |  19  ----------------------------<  278<H>  3.7   |  22  |  1.08    Ca    8.0<L>      20 Jul 2022 07:15  Phos  2.5     07-20  Mg     2.1     07-20          CAPILLARY BLOOD GLUCOSE  CAPILLARY BLOOD GLUCOSE      POCT Blood Glucose.: 371 mg/dL (20 Jul 2022 11:34)  POCT Blood Glucose.: 281 mg/dL (20 Jul 2022 07:51)  POCT Blood Glucose.: 410 mg/dL (19 Jul 2022 21:04)  POCT Blood Glucose.: 280 mg/dL (19 Jul 2022 15:40)          Culture - Urine (collected 07-12-22)  Source: Clean Catch Clean Catch (Midstream)  Final Report (07-14-22):    <10,000 CFU/mL Normal Urogenital Mireille        RADIOLOGY & ADDITIONAL TESTS:          Imaging Personally Reviewed:  [ ] YES  [ ] NO    Consultant(s) Notes Reviewed:  [ ] YES  [ ] NO    Care Discussed with Consultants/Other Providers [x ] YES  [ ] NO

## 2022-07-20 NOTE — PROGRESS NOTE ADULT - SUBJECTIVE AND OBJECTIVE BOX
POD6 s/p L4-5 TLIF    Patient appears to be resting comfortably. Pain but overall controlled. Denies new weakness, numbness or tingling. Denies chest pain, shortness of breath, nausea or vomiting.    Vital Signs Last 24 Hrs  T(C): 36.6 (20 Jul 2022 05:07), Max: 37.3 (19 Jul 2022 11:53)  T(F): 97.9 (20 Jul 2022 05:07), Max: 99.2 (19 Jul 2022 11:53)  HR: 83 (20 Jul 2022 05:07) (83 - 95)  BP: 145/84 (20 Jul 2022 05:07) (120/68 - 145/84)  BP(mean): --  RR: 16 (20 Jul 2022 05:07) (16 - 18)  SpO2: 99% (20 Jul 2022 05:07) (97% - 99%)    Parameters below as of 20 Jul 2022 05:07  Patient On (Oxygen Delivery Method): room air                            10.5   12.80 )-----------( 278      ( 19 Jul 2022 07:38 )             31.6     PE  General: awake, alert     Spine:  dressing CDI  +mild dee dee-incisional TTP  + radial pulses  + DP Pulses    Motor:                   C5                C6              C7               C8           T1   R             5/5                5/5            5/5              5/5          5/5  L             5/5                5/5            5/5              5/5          5/5                    L2                  L3             L4              L5            S1  R            4/5                5/5             5/5            5/5          5/5  L             4/5                5/5            5/5            5/5          5/5    Sensory:            C5         C6         C7      C8       T1        (0=absent, 1=impaired, 2=normal, NT=not testable)  R         2            2           2        2         2  L          2            2           2        2         2               L2          L3         L4      L5       S1         (0=absent, 1=impaired, 2=normal, NT=not testable)  R         2            2            2        2        2  L          2            2           2        2         2        A/P:  66y F s/p L4-5 decompression, TLIF POD 6    -Medical management appreciated  -PT/OT   -WBAT  -Pain Control PRN  -DVT ppx - HOLD ALL CHEMICAL DVT PPX s/p spine surgery  -FU AM Labs  -Incentive Spirometry  -Urology consulted, recs appreciated  -Bladder function appears to be gradually improving, albeit slowly; contractility improving based upon PVRs, however component of overflow incontinence could be contributing  -pt may require long term patton if bladder functions continues to not improve, family aware it could take months, discussed possibility of it not returning  -f/u with urology outpatient for patton management and monitoring return of bladder function  -Continue with Bowel Regimen   -No further acute orthopaedic surgical intervention indicated. Orthopaedically stable for d/c w/ outpatient followup   -Will Discuss with attending Dr. Christopher and advise if any changes to plan

## 2022-07-20 NOTE — PROGRESS NOTE ADULT - ASSESSMENT
66F Spanish speaking PMHx of HTN, HLD, DM 2  presenting with left hip pain x 1-2 days. Described as mild, achy, shooting pain down left buttocks to knee. Exacerbated w/ standing and position. Pain level is 8/10 in intensity, not getting better after multiple doses of pain meds in ER. Otherwise, the patient denies any neck pain, headaches, chest pain, shortness of breath, n/v/d, abdominal pain, recent illness, fevers, chills, recent spinal/back surgeries or procedures, bowel or bladder incontinence, bowel or bladder retention, constipation, IV drug use, known cancer history, recent weight loss, trauma, weakness, other subjective neurological deficits, numbness/tingling, dysuria, hematuria, rashes. Ct Abd/Pelvis and CT Lumbar Spine :Mild motion artifact in the abdomen. No evidence of acute abdominal or pelvic abnormality. No evidence of acute lumbar spine abnormality. Mild degenerative changes of the lower lumbar spine. US negative for DVT.     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   -CT Lumbar Spine: L3-4: Disc bulge slightly asymmetric to the left. Mild bilateral facet   arthropathy. Mild spinal canal stenosis and minimal left neural foraminal   narrowing.  L4-5: Moderate disc bulge, mild bilateral facet arthropathy and   thickening of ligamenta flava. Moderate to severe spinal canal stenosis.   Mild to moderate left greater than right neural foraminal narrowing.  L5-S1: Mild disc bulge. Moderate bilateral facet arthropathy.   Mild/moderate spinal canal stenosis. Mass effect on the traversing   bilateral S1 nerve roots. Mild bilateral neural foraminal narrowing.  Orthopedic on board   7/14 L4-5 decompression, TLIF ( transforaminal lumbar interbody fusion)  -cont w/ analgesics prn   -cont w/  lumosacral orthosis brace; MAIA on discharge     Acute Toxic Metabolic Encephalopathy   per my colleague's documentation was presumed secondary to opioid  Head CT: no acute changes    cont to monitor mentation with any  opioid use     DM II   HgA1c: 8.4% uncontrolled   cont w/  lantus and prandial insulin   cont w/ FS monitoring w/ insulin s/s coverage for now   7/20/2022 FS still elevated will d/w endocrine    CHRIS-on Stage 2 chronic kidney disease  avoid nephrotoxic medications, renal dose meds   cont to monitor renal function as needed    7/20/2022 at baseline    Hypertension   cont w/ Metoprolol.    Dyslipidemia.   cont w/ Atorvastatin.    DVTp: heparin   Disposition: Medically clear; pending MAIA acceptance     Rob Winter [phone]

## 2022-07-20 NOTE — PROGRESS NOTE ADULT - SUBJECTIVE AND OBJECTIVE BOX
Patient is a 66y old  Female who presents with a chief complaint of Left leg/thigh pain. (20 Jul 2022 12:22)      Interval History: finger sticks are stable and in 250-300 range   on Lantus 30 units and prandial lispro 10 units   Creatinine now normal     MEDICATIONS  (STANDING):  atorvastatin Oral Tab/Cap - Peds 10 milliGRAM(s) Oral daily  cyclobenzaprine 10 milliGRAM(s) Oral every 8 hours  dextrose 5%. 1000 milliLiter(s) (50 mL/Hr) IV Continuous <Continuous>  dextrose 5%. 1000 milliLiter(s) (100 mL/Hr) IV Continuous <Continuous>  dextrose 5%. 1000 milliLiter(s) (50 mL/Hr) IV Continuous <Continuous>  dextrose 50% Injectable 25 Gram(s) IV Push once  dextrose 50% Injectable 25 Gram(s) IV Push once  dextrose 50% Injectable 12.5 Gram(s) IV Push once  dextrose 50% Injectable 25 Gram(s) IV Push once  ferrous    sulfate 325 milliGRAM(s) Oral two times a day  glucagon  Injectable 1 milliGRAM(s) IntraMuscular once  glucagon  Injectable 1 milliGRAM(s) IntraMuscular once  insulin glargine Injectable (LANTUS) 30 Unit(s) SubCutaneous at bedtime  insulin lispro (ADMELOG) corrective regimen sliding scale   SubCutaneous three times a day before meals  insulin lispro (ADMELOG) corrective regimen sliding scale   SubCutaneous at bedtime  insulin lispro Injectable (ADMELOG) 10 Unit(s) SubCutaneous three times a day before meals  lidocaine 2% Viscous 15 milliLiter(s) Swish and Spit two times a day  metoprolol succinate ER 25 milliGRAM(s) Oral daily  montelukast 10 milliGRAM(s) Oral daily  naloxone Injectable 0.4 milliGRAM(s) IV Push once  pantoprazole    Tablet 40 milliGRAM(s) Oral before breakfast  polyethylene glycol 3350 17 Gram(s) Oral daily  senna 2 Tablet(s) Oral at bedtime  sodium chloride 0.9%. 1000 milliLiter(s) (75 mL/Hr) IV Continuous <Continuous>  vitamin A &amp; D Ointment 1 Application(s) Topical two times a day    MEDICATIONS  (PRN):  acetaminophen     Tablet .. 650 milliGRAM(s) Oral every 6 hours PRN Temp greater or equal to 38C (100.4F), Mild Pain (1 - 3)  benzocaine 15 mG/menthol 3.6 mG Lozenge 1 Lozenge Oral three times a day PRN Sore Throat  bisacodyl 5 milliGRAM(s) Oral every 12 hours PRN Constipation  dextrose Oral Gel 15 Gram(s) Oral once PRN Blood Glucose LESS THAN 70 milliGRAM(s)/deciliter  dextrose Oral Gel 15 Gram(s) Oral once PRN Blood Glucose LESS THAN 70 milliGRAM(s)/deciliter  HYDROmorphone  Injectable 1 milliGRAM(s) IV Push every 4 hours PRN Severe Pain (7 - 10)  magnesium hydroxide Suspension 30 milliLiter(s) Oral every 12 hours PRN Constipation  melatonin 3 milliGRAM(s) Oral at bedtime PRN Insomnia  ondansetron Injectable 4 milliGRAM(s) IV Push every 6 hours PRN Nausea and/or Vomiting  oxycodone    5 mG/acetaminophen 325 mG 2 Tablet(s) Oral every 4 hours PRN Moderate Pain (4 - 6)      Allergies    No Known Allergies    Intolerances        REVIEW OF SYSTEMS:  CONSTITUTIONAL: no changes  EYES: No eye pain, visual disturbances, or discharge  ENMT:  No difficulty hearing, No sinus or throat pain  NECK: No pain or stiffness  RESPIRATORY: No cough, wheezing, chills or hemoptysis; No shortness of breath  CARDIOVASCULAR: No chest pain, palpitations or leg swelling  GASTROINTESTINAL: No abdominal or epigastric pain. No nausea, vomiting, or hematemesis; No diarrhea or constipation. No melena or hematochezia.  GENITOURINARY: No dysuria, frequency, hematuria, or incontinence  NEUROLOGICAL: No headaches, memory loss, loss of strength, numbness, or tremors  SKIN: No itching, burning, rashes, or lesions   ENDOCRINE: No heat or cold intolerance; No hair loss  MUSCULOSKELETAL: No joint pain or swelling; No muscle, back, or extremity pain  PSYCHIATRIC: No depression, anxiety, mood swings, or difficulty sleeping  HEME/LYMPH: No easy bruising, or bleeding gums  ALLERY AND IMMUNOLOGIC: No hives or eczema    Vital Signs Last 24 Hrs  T(C): 36.3 (20 Jul 2022 17:23), Max: 36.8 (20 Jul 2022 12:21)  T(F): 97.3 (20 Jul 2022 17:23), Max: 98.3 (20 Jul 2022 12:21)  HR: 87 (20 Jul 2022 18:11) (83 - 90)  BP: 161/69 (20 Jul 2022 18:11) (114/64 - 161/69)  BP(mean): --  RR: 18 (20 Jul 2022 17:23) (16 - 18)  SpO2: 100% (20 Jul 2022 18:11) (98% - 100%)    Parameters below as of 20 Jul 2022 18:11  Patient On (Oxygen Delivery Method): room air        PHYSICAL EXAM:  GENERAL:   HEAD: Atraumatic, Normocephalic  EYES: PERRLA, conjunctiva and sclera clear  ENMT: No  exudates,; Moist mucous membranes,, No lesions  NECK: Supple, No JVD, Normal thyroid  NERVOUS SYSTEM:  Alert & Oriented,   CHEST/LUNG: Clear to auscultation bilaterally; No rales, rhonchi, wheezing, or rubs  HEART: Regular rate and rhythm; No murmurs, rubs, or gallops  ABDOMEN: Soft, Nontender, Nondistended; Bowel sounds present  EXTREMITIES:  2+ Peripheral Pulses, no edema  SKIN: No rashes or lesions    LABS:        CAPILLARY BLOOD GLUCOSE      POCT Blood Glucose.: 249 mg/dL (20 Jul 2022 16:44)  POCT Blood Glucose.: 371 mg/dL (20 Jul 2022 11:34)  POCT Blood Glucose.: 281 mg/dL (20 Jul 2022 07:51)    Lipid panel:           Thyroid:  Diabetes Tests:  Parathyroid Panel:  Adrenals:  RADIOLOGY & ADDITIONAL TESTS:    Imaging Personally Reviewed:  [ ] YES  [ ] NO    Consultant(s) Notes Reviewed:  [ ] YES  [ ] NO    Care Discussed with Consultants/Other Providers [ ] YES  [ ] NO

## 2022-07-21 LAB
ANION GAP SERPL CALC-SCNC: 10 MMOL/L — SIGNIFICANT CHANGE UP (ref 5–17)
BASOPHILS # BLD AUTO: 0.02 K/UL — SIGNIFICANT CHANGE UP (ref 0–0.2)
BASOPHILS NFR BLD AUTO: 0.2 % — SIGNIFICANT CHANGE UP (ref 0–2)
BUN SERPL-MCNC: 14 MG/DL — SIGNIFICANT CHANGE UP (ref 7–23)
CALCIUM SERPL-MCNC: 7.7 MG/DL — LOW (ref 8.5–10.1)
CHLORIDE SERPL-SCNC: 104 MMOL/L — SIGNIFICANT CHANGE UP (ref 96–108)
CO2 SERPL-SCNC: 21 MMOL/L — LOW (ref 22–31)
CREAT SERPL-MCNC: 0.99 MG/DL — SIGNIFICANT CHANGE UP (ref 0.5–1.3)
EGFR: 63 ML/MIN/1.73M2 — SIGNIFICANT CHANGE UP
EOSINOPHIL # BLD AUTO: 0.33 K/UL — SIGNIFICANT CHANGE UP (ref 0–0.5)
EOSINOPHIL NFR BLD AUTO: 3.2 % — SIGNIFICANT CHANGE UP (ref 0–6)
GLUCOSE BLDC GLUCOMTR-MCNC: 212 MG/DL — HIGH (ref 70–99)
GLUCOSE BLDC GLUCOMTR-MCNC: 233 MG/DL — HIGH (ref 70–99)
GLUCOSE BLDC GLUCOMTR-MCNC: 278 MG/DL — HIGH (ref 70–99)
GLUCOSE BLDC GLUCOMTR-MCNC: 307 MG/DL — HIGH (ref 70–99)
GLUCOSE SERPL-MCNC: 273 MG/DL — HIGH (ref 70–99)
HCT VFR BLD CALC: 29.4 % — LOW (ref 34.5–45)
HGB BLD-MCNC: 9.9 G/DL — LOW (ref 11.5–15.5)
IMM GRANULOCYTES NFR BLD AUTO: 0.9 % — SIGNIFICANT CHANGE UP (ref 0–1.5)
LYMPHOCYTES # BLD AUTO: 1.22 K/UL — SIGNIFICANT CHANGE UP (ref 1–3.3)
LYMPHOCYTES # BLD AUTO: 11.7 % — LOW (ref 13–44)
MCHC RBC-ENTMCNC: 29.4 PG — SIGNIFICANT CHANGE UP (ref 27–34)
MCHC RBC-ENTMCNC: 33.7 G/DL — SIGNIFICANT CHANGE UP (ref 32–36)
MCV RBC AUTO: 87.2 FL — SIGNIFICANT CHANGE UP (ref 80–100)
MONOCYTES # BLD AUTO: 0.6 K/UL — SIGNIFICANT CHANGE UP (ref 0–0.9)
MONOCYTES NFR BLD AUTO: 5.7 % — SIGNIFICANT CHANGE UP (ref 2–14)
NEUTROPHILS # BLD AUTO: 8.19 K/UL — HIGH (ref 1.8–7.4)
NEUTROPHILS NFR BLD AUTO: 78.3 % — HIGH (ref 43–77)
NRBC # BLD: 0 /100 WBCS — SIGNIFICANT CHANGE UP (ref 0–0)
PLATELET # BLD AUTO: 328 K/UL — SIGNIFICANT CHANGE UP (ref 150–400)
POTASSIUM SERPL-MCNC: 3.7 MMOL/L — SIGNIFICANT CHANGE UP (ref 3.5–5.3)
POTASSIUM SERPL-SCNC: 3.7 MMOL/L — SIGNIFICANT CHANGE UP (ref 3.5–5.3)
RBC # BLD: 3.37 M/UL — LOW (ref 3.8–5.2)
RBC # FLD: 13.1 % — SIGNIFICANT CHANGE UP (ref 10.3–14.5)
SODIUM SERPL-SCNC: 135 MMOL/L — SIGNIFICANT CHANGE UP (ref 135–145)
WBC # BLD: 10.45 K/UL — SIGNIFICANT CHANGE UP (ref 3.8–10.5)
WBC # FLD AUTO: 10.45 K/UL — SIGNIFICANT CHANGE UP (ref 3.8–10.5)

## 2022-07-21 PROCEDURE — 99232 SBSQ HOSP IP/OBS MODERATE 35: CPT

## 2022-07-21 RX ORDER — METFORMIN HYDROCHLORIDE 850 MG/1
500 TABLET ORAL DAILY
Refills: 0 | Status: DISCONTINUED | OUTPATIENT
Start: 2022-07-21 | End: 2022-07-26

## 2022-07-21 RX ADMIN — MONTELUKAST 10 MILLIGRAM(S): 4 TABLET, CHEWABLE ORAL at 12:55

## 2022-07-21 RX ADMIN — INSULIN GLARGINE 30 UNIT(S): 100 INJECTION, SOLUTION SUBCUTANEOUS at 21:12

## 2022-07-21 RX ADMIN — CYCLOBENZAPRINE HYDROCHLORIDE 10 MILLIGRAM(S): 10 TABLET, FILM COATED ORAL at 05:28

## 2022-07-21 RX ADMIN — Medication 1 APPLICATION(S): at 05:35

## 2022-07-21 RX ADMIN — Medication 4: at 07:56

## 2022-07-21 RX ADMIN — Medication 25 MILLIGRAM(S): at 05:28

## 2022-07-21 RX ADMIN — CYCLOBENZAPRINE HYDROCHLORIDE 10 MILLIGRAM(S): 10 TABLET, FILM COATED ORAL at 21:11

## 2022-07-21 RX ADMIN — CYCLOBENZAPRINE HYDROCHLORIDE 10 MILLIGRAM(S): 10 TABLET, FILM COATED ORAL at 14:59

## 2022-07-21 RX ADMIN — Medication 10 UNIT(S): at 16:09

## 2022-07-21 RX ADMIN — Medication 8: at 16:08

## 2022-07-21 RX ADMIN — Medication 1 APPLICATION(S): at 18:36

## 2022-07-21 RX ADMIN — Medication 10 UNIT(S): at 07:57

## 2022-07-21 RX ADMIN — Medication 325 MILLIGRAM(S): at 05:28

## 2022-07-21 RX ADMIN — ATORVASTATIN CALCIUM 10 MILLIGRAM(S): 80 TABLET, FILM COATED ORAL at 12:57

## 2022-07-21 RX ADMIN — SENNA PLUS 2 TABLET(S): 8.6 TABLET ORAL at 21:11

## 2022-07-21 RX ADMIN — PANTOPRAZOLE SODIUM 40 MILLIGRAM(S): 20 TABLET, DELAYED RELEASE ORAL at 07:59

## 2022-07-21 RX ADMIN — LIDOCAINE 15 MILLILITER(S): 4 CREAM TOPICAL at 18:32

## 2022-07-21 RX ADMIN — Medication 325 MILLIGRAM(S): at 18:33

## 2022-07-21 RX ADMIN — POLYETHYLENE GLYCOL 3350 17 GRAM(S): 17 POWDER, FOR SOLUTION ORAL at 12:55

## 2022-07-21 RX ADMIN — LIDOCAINE 15 MILLILITER(S): 4 CREAM TOPICAL at 05:28

## 2022-07-21 RX ADMIN — SODIUM CHLORIDE 75 MILLILITER(S): 9 INJECTION INTRAMUSCULAR; INTRAVENOUS; SUBCUTANEOUS at 05:29

## 2022-07-21 NOTE — PROGRESS NOTE ADULT - SUBJECTIVE AND OBJECTIVE BOX
Patient is a 66y old  Female who presents with a chief complaint of Left leg/thigh pain. (19 Jul 2022 05:54)    Kinyarwanda      INTERVAL HPI/ OVERNIGHT EVENTS: Pt was seen and examined at bedside today,    MEDICATIONS  (STANDING):  atorvastatin Oral Tab/Cap - Peds 10 milliGRAM(s) Oral daily  cyclobenzaprine 10 milliGRAM(s) Oral every 8 hours  dextrose 5%. 1000 milliLiter(s) (50 mL/Hr) IV Continuous <Continuous>  dextrose 5%. 1000 milliLiter(s) (100 mL/Hr) IV Continuous <Continuous>  dextrose 5%. 1000 milliLiter(s) (50 mL/Hr) IV Continuous <Continuous>  dextrose 50% Injectable 25 Gram(s) IV Push once  dextrose 50% Injectable 25 Gram(s) IV Push once  dextrose 50% Injectable 12.5 Gram(s) IV Push once  dextrose 50% Injectable 25 Gram(s) IV Push once  ferrous    sulfate 325 milliGRAM(s) Oral two times a day  glucagon  Injectable 1 milliGRAM(s) IntraMuscular once  glucagon  Injectable 1 milliGRAM(s) IntraMuscular once  insulin glargine Injectable (LANTUS) 30 Unit(s) SubCutaneous at bedtime  insulin lispro (ADMELOG) corrective regimen sliding scale   SubCutaneous three times a day before meals  insulin lispro (ADMELOG) corrective regimen sliding scale   SubCutaneous at bedtime  insulin lispro Injectable (ADMELOG) 10 Unit(s) SubCutaneous three times a day before meals  lidocaine 2% Viscous 15 milliLiter(s) Swish and Spit two times a day  metoprolol succinate ER 25 milliGRAM(s) Oral daily  montelukast 10 milliGRAM(s) Oral daily  naloxone Injectable 0.4 milliGRAM(s) IV Push once  pantoprazole    Tablet 40 milliGRAM(s) Oral before breakfast  polyethylene glycol 3350 17 Gram(s) Oral daily  senna 2 Tablet(s) Oral at bedtime  sodium chloride 0.9%. 1000 milliLiter(s) (75 mL/Hr) IV Continuous <Continuous>  vitamin A &amp; D Ointment 1 Application(s) Topical two times a day    MEDICATIONS  (PRN):  acetaminophen     Tablet .. 650 milliGRAM(s) Oral every 6 hours PRN Temp greater or equal to 38C (100.4F), Mild Pain (1 - 3)  benzocaine 15 mG/menthol 3.6 mG Lozenge 1 Lozenge Oral three times a day PRN Sore Throat  bisacodyl 5 milliGRAM(s) Oral every 12 hours PRN Constipation  dextrose Oral Gel 15 Gram(s) Oral once PRN Blood Glucose LESS THAN 70 milliGRAM(s)/deciliter  dextrose Oral Gel 15 Gram(s) Oral once PRN Blood Glucose LESS THAN 70 milliGRAM(s)/deciliter  HYDROmorphone  Injectable 1 milliGRAM(s) IV Push every 4 hours PRN Severe Pain (7 - 10)  magnesium hydroxide Suspension 30 milliLiter(s) Oral every 12 hours PRN Constipation  melatonin 3 milliGRAM(s) Oral at bedtime PRN Insomnia  ondansetron Injectable 4 milliGRAM(s) IV Push every 6 hours PRN Nausea and/or Vomiting  oxycodone    5 mG/acetaminophen 325 mG 2 Tablet(s) Oral every 4 hours PRN Moderate Pain (4 - 6)      Allergies    No Known Allergies    Intolerances  Vital Signs Last 24 Hrs  T(C): 36.6 (21 Jul 2022 10:30), Max: 36.8 (20 Jul 2022 12:21)  T(F): 97.8 (21 Jul 2022 10:30), Max: 98.3 (20 Jul 2022 12:21)  HR: 89 (21 Jul 2022 10:30) (84 - 96)  BP: 144/72 (21 Jul 2022 10:30) (114/64 - 161/82)  BP(mean): --  RR: 18 (21 Jul 2022 10:30) (17 - 18)  SpO2: 97% (21 Jul 2022 10:30) (96% - 100%)    Parameters below as of 21 Jul 2022 10:30  Patient On (Oxygen Delivery Method): room air        PHYSICAL EXAM:  GENERAL: NAD on RA,  well-developed, well-groomed  HEAD:  Atraumatic, Normocephalic  EYES: conjunctiva and sclera clear  ENMT: Moist mucous membranes  NECK: Supple,   CHEST/LUNG: Clear to Auscultation bilaterally; No rales, rhonchi, wheezing, or rubs  HEART: Regular rate and rhythm; No murmurs, rubs, or gallops  ABDOMEN: Soft, Nontender, Nondistended; Bowel sounds present  EXTREMITIES: 2+ Peripheral Pulses, No clubbing, cyanosis, or edema  SKIN: Lumbar surgical wound   NERVOUS SYSTEM:  Alert & Oriented X3, Good concentration; Motor Strength 4/5 B/L upper extremities, Lower extremity strength unable to be assess due to severe pain     LABS:                                     9.9    10.45 )-----------( 328      ( 21 Jul 2022 05:15 )             29.4     07-21    135  |  104  |  14  ----------------------------<  273<H>  3.7   |  21<L>  |  0.99    Ca    7.7<L>      21 Jul 2022 05:15  Phos  2.5     07-20  Mg     2.1     07-20                Culture - Urine (collected 07-12-22)  Source: Clean Catch Clean Catch (Midstream)  Final Report (07-14-22):    <10,000 CFU/mL Normal Urogenital Mireille        RADIOLOGY & ADDITIONAL TESTS:          Imaging Personally Reviewed:  [ ] YES  [ ] NO    Consultant(s) Notes Reviewed:  [ ] YES  [ ] NO    Care Discussed with Consultants/Other Providers [x ] YES  [ ] NO

## 2022-07-21 NOTE — PROGRESS NOTE ADULT - ASSESSMENT
66F Azeri speaking PMHx of HTN, HLD, DM 2  presenting with left hip pain x 1-2 days. Described as mild, achy, shooting pain down left buttocks to knee. Exacerbated w/ standing and position. Pain level is 8/10 in intensity, not getting better after multiple doses of pain meds in ER. Otherwise, the patient denies any neck pain, headaches, chest pain, shortness of breath, n/v/d, abdominal pain, recent illness, fevers, chills, recent spinal/back surgeries or procedures, bowel or bladder incontinence, bowel or bladder retention, constipation, IV drug use, known cancer history, recent weight loss, trauma, weakness, other subjective neurological deficits, numbness/tingling, dysuria, hematuria, rashes. Ct Abd/Pelvis and CT Lumbar Spine :Mild motion artifact in the abdomen. No evidence of acute abdominal or pelvic abnormality. No evidence of acute lumbar spine abnormality. Mild degenerative changes of the lower lumbar spine. US negative for DVT.     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   -CT Lumbar Spine: L3-4: Disc bulge slightly asymmetric to the left. Mild bilateral facet   arthropathy. Mild spinal canal stenosis and minimal left neural foraminal   narrowing.  L4-5: Moderate disc bulge, mild bilateral facet arthropathy and   thickening of ligamenta flava. Moderate to severe spinal canal stenosis.   Mild to moderate left greater than right neural foraminal narrowing.  L5-S1: Mild disc bulge. Moderate bilateral facet arthropathy.   Mild/moderate spinal canal stenosis. Mass effect on the traversing   bilateral S1 nerve roots. Mild bilateral neural foraminal narrowing.  Orthopedic on board   7/14 L4-5 decompression, TLIF ( transforaminal lumbar interbody fusion)  -cont w/ analgesics prn   -cont w/  lumosacral orthosis brace; MAIA on discharge     Acute Toxic Metabolic Encephalopathy   per my colleague's documentation was presumed secondary to opioid  Head CT: no acute changes    cont to monitor mentation with any  opioid use     DM II   HgA1c: 8.4% uncontrolled   cont w/  lantus and prandial insulin   cont w/ FS monitoring w/ insulin s/s coverage for now   7/20/2022 FS still elevated will d/w endocrine    CHRIS-on Stage 2 chronic kidney disease  avoid nephrotoxic medications, renal dose meds   cont to monitor renal function as needed    7/20/2022 at baseline    Hypertension   cont w/ Metoprolol.    Dyslipidemia.   cont w/ Atorvastatin.    DVTp: heparin   Disposition:  pending MAIA acceptance     Rob Winter [phone]   66F Spanish speaking PMHx of HTN, HLD, DM 2  presenting with left hip pain x 1-2 days. Described as mild, achy, shooting pain down left buttocks to knee. Exacerbated w/ standing and position. Pain level is 8/10 in intensity, not getting better after multiple doses of pain meds in ER. Otherwise, the patient denies any neck pain, headaches, chest pain, shortness of breath, n/v/d, abdominal pain, recent illness, fevers, chills, recent spinal/back surgeries or procedures, bowel or bladder incontinence, bowel or bladder retention, constipation, IV drug use, known cancer history, recent weight loss, trauma, weakness, other subjective neurological deficits, numbness/tingling, dysuria, hematuria, rashes. Ct Abd/Pelvis and CT Lumbar Spine :Mild motion artifact in the abdomen. No evidence of acute abdominal or pelvic abnormality. No evidence of acute lumbar spine abnormality. Mild degenerative changes of the lower lumbar spine. US negative for DVT.     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   -CT Lumbar Spine: L3-4: Disc bulge slightly asymmetric to the left. Mild bilateral facet   arthropathy. Mild spinal canal stenosis and minimal left neural foraminal   narrowing.  L4-5: Moderate disc bulge, mild bilateral facet arthropathy and   thickening of ligamenta flava. Moderate to severe spinal canal stenosis.   Mild to moderate left greater than right neural foraminal narrowing.  L5-S1: Mild disc bulge. Moderate bilateral facet arthropathy.   Mild/moderate spinal canal stenosis. Mass effect on the traversing   bilateral S1 nerve roots. Mild bilateral neural foraminal narrowing.  Orthopedic on board   7/14 L4-5 decompression, TLIF ( transforaminal lumbar interbody fusion)  -cont w/ analgesics prn   -cont w/  lumosacral orthosis brace; MAIA on discharge     Acute Toxic Metabolic Encephalopathy   per my colleague's documentation was presumed secondary to opioid  Head CT: no acute changes    cont to monitor mentation with any  opioid use     DM II   HgA1c: 8.4% uncontrolled   cont w/  lantus and prandial insulin   cont w/ FS monitoring w/ insulin s/s coverage for now   7/20/2022 FS still elevated will d/w endocrine    CHRIS-on Stage 2 chronic kidney disease  avoid nephrotoxic medications, renal dose meds   cont to monitor renal function as needed    7/20/2022 at baseline  7/21/2022 voiding per nurse patton was removed     Hypertension   cont w/ Metoprolol.    Dyslipidemia.   cont w/ Atorvastatin.    DVTp: heparin   Disposition:  pending MAIA acceptance     Rob Winter [phone]

## 2022-07-21 NOTE — PROGRESS NOTE ADULT - SUBJECTIVE AND OBJECTIVE BOX
Patient is a 66y old  Female who presents with a chief complaint of Left leg/thigh pain. (21 Jul 2022 12:09)      Interval History: Fingersticks are trending downwards and are in the low 200s.  Currently on Lantus 30 units and prandial lispro 10 units.  Creatinine is normal    MEDICATIONS  (STANDING):  atorvastatin Oral Tab/Cap - Peds 10 milliGRAM(s) Oral daily  cyclobenzaprine 10 milliGRAM(s) Oral every 8 hours  dextrose 5%. 1000 milliLiter(s) (100 mL/Hr) IV Continuous <Continuous>  dextrose 5%. 1000 milliLiter(s) (50 mL/Hr) IV Continuous <Continuous>  dextrose 5%. 1000 milliLiter(s) (50 mL/Hr) IV Continuous <Continuous>  dextrose 50% Injectable 25 Gram(s) IV Push once  dextrose 50% Injectable 25 Gram(s) IV Push once  dextrose 50% Injectable 12.5 Gram(s) IV Push once  dextrose 50% Injectable 25 Gram(s) IV Push once  ferrous    sulfate 325 milliGRAM(s) Oral two times a day  glucagon  Injectable 1 milliGRAM(s) IntraMuscular once  glucagon  Injectable 1 milliGRAM(s) IntraMuscular once  insulin glargine Injectable (LANTUS) 30 Unit(s) SubCutaneous at bedtime  insulin lispro (ADMELOG) corrective regimen sliding scale   SubCutaneous three times a day before meals  insulin lispro (ADMELOG) corrective regimen sliding scale   SubCutaneous at bedtime  insulin lispro Injectable (ADMELOG) 10 Unit(s) SubCutaneous three times a day before meals  lidocaine 2% Viscous 15 milliLiter(s) Swish and Spit two times a day  metFORMIN 500 milliGRAM(s) Oral daily  metoprolol succinate ER 25 milliGRAM(s) Oral daily  montelukast 10 milliGRAM(s) Oral daily  naloxone Injectable 0.4 milliGRAM(s) IV Push once  pantoprazole    Tablet 40 milliGRAM(s) Oral before breakfast  polyethylene glycol 3350 17 Gram(s) Oral daily  senna 2 Tablet(s) Oral at bedtime  sodium chloride 0.9%. 1000 milliLiter(s) (75 mL/Hr) IV Continuous <Continuous>  vitamin A &amp; D Ointment 1 Application(s) Topical two times a day    MEDICATIONS  (PRN):  acetaminophen     Tablet .. 650 milliGRAM(s) Oral every 6 hours PRN Temp greater or equal to 38C (100.4F), Mild Pain (1 - 3)  benzocaine 15 mG/menthol 3.6 mG Lozenge 1 Lozenge Oral three times a day PRN Sore Throat  bisacodyl 5 milliGRAM(s) Oral every 12 hours PRN Constipation  dextrose Oral Gel 15 Gram(s) Oral once PRN Blood Glucose LESS THAN 70 milliGRAM(s)/deciliter  dextrose Oral Gel 15 Gram(s) Oral once PRN Blood Glucose LESS THAN 70 milliGRAM(s)/deciliter  HYDROmorphone  Injectable 1 milliGRAM(s) IV Push every 4 hours PRN Severe Pain (7 - 10)  magnesium hydroxide Suspension 30 milliLiter(s) Oral every 12 hours PRN Constipation  melatonin 3 milliGRAM(s) Oral at bedtime PRN Insomnia  ondansetron Injectable 4 milliGRAM(s) IV Push every 6 hours PRN Nausea and/or Vomiting  oxycodone    5 mG/acetaminophen 325 mG 2 Tablet(s) Oral every 4 hours PRN Moderate Pain (4 - 6)      Allergies    No Known Allergies    Intolerances        REVIEW OF SYSTEMS:  CONSTITUTIONAL: no changes  EYES: No eye pain, visual disturbances, or discharge  ENMT:  No difficulty hearing, No sinus or throat pain  NECK: No pain or stiffness  RESPIRATORY: No cough, wheezing, chills or hemoptysis; No shortness of breath  CARDIOVASCULAR: No chest pain, palpitations or leg swelling  GASTROINTESTINAL: No abdominal or epigastric pain. No nausea, vomiting, or hematemesis; No diarrhea or constipation. No melena or hematochezia.  GENITOURINARY: No dysuria, frequency, hematuria, or incontinence  NEUROLOGICAL: No headaches, memory loss, loss of strength, numbness, or tremors  SKIN: No itching, burning, rashes, or lesions   ENDOCRINE: No heat or cold intolerance; No hair loss  MUSCULOSKELETAL: No joint pain or swelling; No muscle, back, or extremity pain  PSYCHIATRIC: No depression, anxiety, mood swings, or difficulty sleeping  HEME/LYMPH: No easy bruising, or bleeding gums  ALLERY AND IMMUNOLOGIC: No hives or eczema    Vital Signs Last 24 Hrs  T(C): 36.8 (21 Jul 2022 16:42), Max: 36.8 (21 Jul 2022 16:42)  T(F): 98.3 (21 Jul 2022 16:42), Max: 98.3 (21 Jul 2022 16:42)  HR: 97 (21 Jul 2022 16:42) (86 - 97)  BP: 148/81 (21 Jul 2022 16:42) (144/72 - 161/82)  BP(mean): --  RR: 18 (21 Jul 2022 16:42) (17 - 18)  SpO2: 97% (21 Jul 2022 16:42) (96% - 98%)    Parameters below as of 21 Jul 2022 16:42  Patient On (Oxygen Delivery Method): room air        PHYSICAL EXAM:  GENERAL:   HEAD: Atraumatic, Normocephalic  EYES: PERRLA, conjunctiva and sclera clear  ENMT: No  exudates,; Moist mucous membranes,, No lesions  NECK: Supple, No JVD, Normal thyroid  NERVOUS SYSTEM:  Alert & Oriented,   CHEST/LUNG: Clear to auscultation bilaterally; No rales, rhonchi, wheezing, or rubs  HEART: Regular rate and rhythm; No murmurs, rubs, or gallops  ABDOMEN: Soft, Nontender, Nondistended; Bowel sounds present  EXTREMITIES:  2+ Peripheral Pulses, no edema  SKIN: No rashes or lesions    LABS:        CAPILLARY BLOOD GLUCOSE      POCT Blood Glucose.: 212 mg/dL (21 Jul 2022 21:00)  POCT Blood Glucose.: 307 mg/dL (21 Jul 2022 15:25)  POCT Blood Glucose.: 278 mg/dL (21 Jul 2022 11:04)  POCT Blood Glucose.: 233 mg/dL (21 Jul 2022 07:42)  POCT Blood Glucose.: 261 mg/dL (20 Jul 2022 21:24)    Lipid panel:           Thyroid:  Diabetes Tests:  Parathyroid Panel:  Adrenals:  RADIOLOGY & ADDITIONAL TESTS:    Imaging Personally Reviewed:  [ ] YES  [ ] NO    Consultant(s) Notes Reviewed:  [ ] YES  [ ] NO    Care Discussed with Consultants/Other Providers [ ] YES  [ ] NO

## 2022-07-21 NOTE — PROGRESS NOTE ADULT - SUBJECTIVE AND OBJECTIVE BOX
POD7 s/p L4-5 TLIF    Patient appears to be resting comfortably. Pain, but overall controlled. Denies new weakness, numbness or tingling. Denies chest pain, shortness of breath, nausea or vomiting.    LABS:                        9.9    10.45 )-----------( 328      ( 21 Jul 2022 05:15 )             29.4     07-20    137  |  106  |  19  ----------------------------<  278<H>  3.7   |  22  |  1.08    Ca    8.0<L>      20 Jul 2022 07:15  Phos  2.5     07-20  Mg     2.1     07-20            VITAL SIGNS:  T(C): 36.6 (07-21-22 @ 05:06), Max: 36.8 (07-20-22 @ 12:21)  HR: 86 (07-21-22 @ 05:06) (84 - 96)  BP: 158/80 (07-21-22 @ 05:06) (114/64 - 161/82)  RR: 17 (07-21-22 @ 05:06) (17 - 18)  SpO2: 96% (07-21-22 @ 05:06) (96% - 100%)    PE  General: awake, alert     Spine:  dressing CDI  +mild dee dee-incisional TTP  + radial pulses  + DP Pulses    Motor:                   C5                C6              C7               C8           T1   R             5/5                5/5            5/5              5/5          5/5  L             5/5                5/5            5/5              5/5          5/5                    L2                  L3             L4              L5            S1  R            5/5                5/5             5/5            5/5          5/5  L             5/5                5/5            5/5            5/5          5/5    Sensory:            C5         C6         C7      C8       T1        (0=absent, 1=impaired, 2=normal, NT=not testable)  R         2            2           2        2         2  L          2            2           2        2         2               L2          L3         L4      L5       S1         (0=absent, 1=impaired, 2=normal, NT=not testable)  R         2            2            2        2        2  L          2            2           2        2         2        A/P:  66y F s/p L4-5 decompression, TLIF POD 7    -Medical management appreciated  -PT/OT   -WBAT  -Pain Control PRN  -DVT ppx - HOLD ALL CHEMICAL DVT PPX s/p spine surgery  -FU AM Labs  -Incentive Spirometry  -Urology consulted, recs appreciated  -Bladder function appears to be gradually improving, albeit slowly; contractility improving based upon PVRs, however component of overflow incontinence could be contributing  -pt may require long term patton if bladder functions continues to not improve, family aware it could take months, discussed possibility of it not returning  -f/u with urology outpatient for patton management and monitoring return of bladder function  -Continue with Bowel Regimen   -No further acute orthopaedic surgical intervention indicated. Orthopaedically stable for d/c w/ outpatient followup   -Will Discuss with attending Dr. Christopher and advise if any changes to plan POD7 s/p L4-5 TLIF    Patient appears to be resting comfortably. Pain, but overall controlled. Denies new weakness, numbness or tingling. Denies chest pain, shortness of breath, nausea or vomiting.    LABS:                        9.9    10.45 )-----------( 328      ( 21 Jul 2022 05:15 )             29.4     07-20    137  |  106  |  19  ----------------------------<  278<H>  3.7   |  22  |  1.08    Ca    8.0<L>      20 Jul 2022 07:15  Phos  2.5     07-20  Mg     2.1     07-20            VITAL SIGNS:  T(C): 36.6 (07-21-22 @ 05:06), Max: 36.8 (07-20-22 @ 12:21)  HR: 86 (07-21-22 @ 05:06) (84 - 96)  BP: 158/80 (07-21-22 @ 05:06) (114/64 - 161/82)  RR: 17 (07-21-22 @ 05:06) (17 - 18)  SpO2: 96% (07-21-22 @ 05:06) (96% - 100%)    PE  General: awake, alert     Spine:  dressing CDI  +mild dee dee-incisional TTP  + radial pulses  + DP Pulses    Motor:                   C5                C6              C7               C8           T1   R             5/5                5/5            5/5              5/5          5/5  L             5/5                5/5            5/5              5/5          5/5                    L2                  L3             L4              L5            S1  R            5/5                5/5             5/5            5/5          5/5  L             5/5                5/5            5/5            5/5          5/5    Sensory:            C5         C6         C7      C8       T1        (0=absent, 1=impaired, 2=normal, NT=not testable)  R         2            2           2        2         2  L          2            2           2        2         2               L2          L3         L4      L5       S1         (0=absent, 1=impaired, 2=normal, NT=not testable)  R         2            2            2        2        2  L          2            2           2        2         2        A/P:  66y F s/p L4-5 decompression, TLIF POD 7    -Plan for TOV today, BS after 8 hours, if pt fails will replace patton  -Medical management appreciated  -PT/OT   -WBAT  -Pain Control PRN  -DVT ppx - HOLD ALL CHEMICAL DVT PPX s/p spine surgery  -FU AM Labs  -Incentive Spirometry  -Urology consulted, recs appreciated  -Bladder function appears to be gradually improving, albeit slowly; contractility improving based upon PVRs, however component of overflow incontinence could be contributing  -pt may require long term patton if bladder functions continues to not improve, family aware it could take months, discussed possibility of it not returning  -f/u with urology outpatient for patton management and monitoring return of bladder function  -Continue with Bowel Regimen   -No further acute orthopaedic surgical intervention indicated. Orthopaedically stable for d/c w/ outpatient followup   -Will Discuss with attending Dr. Christopher and advise if any changes to plan

## 2022-07-22 LAB
GLUCOSE BLDC GLUCOMTR-MCNC: 184 MG/DL — HIGH (ref 70–99)
GLUCOSE BLDC GLUCOMTR-MCNC: 188 MG/DL — HIGH (ref 70–99)
GLUCOSE BLDC GLUCOMTR-MCNC: 237 MG/DL — HIGH (ref 70–99)
GLUCOSE BLDC GLUCOMTR-MCNC: 349 MG/DL — HIGH (ref 70–99)

## 2022-07-22 PROCEDURE — 99232 SBSQ HOSP IP/OBS MODERATE 35: CPT

## 2022-07-22 RX ORDER — HEPARIN SODIUM 5000 [USP'U]/ML
5000 INJECTION INTRAVENOUS; SUBCUTANEOUS EVERY 8 HOURS
Refills: 0 | Status: DISCONTINUED | OUTPATIENT
Start: 2022-07-22 | End: 2022-07-29

## 2022-07-22 RX ADMIN — POLYETHYLENE GLYCOL 3350 17 GRAM(S): 17 POWDER, FOR SOLUTION ORAL at 12:02

## 2022-07-22 RX ADMIN — CYCLOBENZAPRINE HYDROCHLORIDE 10 MILLIGRAM(S): 10 TABLET, FILM COATED ORAL at 21:11

## 2022-07-22 RX ADMIN — LIDOCAINE 15 MILLILITER(S): 4 CREAM TOPICAL at 06:07

## 2022-07-22 RX ADMIN — METFORMIN HYDROCHLORIDE 500 MILLIGRAM(S): 850 TABLET ORAL at 12:10

## 2022-07-22 RX ADMIN — ATORVASTATIN CALCIUM 10 MILLIGRAM(S): 80 TABLET, FILM COATED ORAL at 12:04

## 2022-07-22 RX ADMIN — Medication 1 APPLICATION(S): at 06:30

## 2022-07-22 RX ADMIN — Medication 25 MILLIGRAM(S): at 06:08

## 2022-07-22 RX ADMIN — Medication 10 UNIT(S): at 08:15

## 2022-07-22 RX ADMIN — CYCLOBENZAPRINE HYDROCHLORIDE 10 MILLIGRAM(S): 10 TABLET, FILM COATED ORAL at 06:07

## 2022-07-22 RX ADMIN — SENNA PLUS 2 TABLET(S): 8.6 TABLET ORAL at 21:11

## 2022-07-22 RX ADMIN — Medication 4: at 16:08

## 2022-07-22 RX ADMIN — Medication 2: at 08:15

## 2022-07-22 RX ADMIN — Medication 10 UNIT(S): at 16:09

## 2022-07-22 RX ADMIN — CYCLOBENZAPRINE HYDROCHLORIDE 10 MILLIGRAM(S): 10 TABLET, FILM COATED ORAL at 13:58

## 2022-07-22 RX ADMIN — MONTELUKAST 10 MILLIGRAM(S): 4 TABLET, CHEWABLE ORAL at 12:03

## 2022-07-22 RX ADMIN — Medication 1 APPLICATION(S): at 17:59

## 2022-07-22 RX ADMIN — PANTOPRAZOLE SODIUM 40 MILLIGRAM(S): 20 TABLET, DELAYED RELEASE ORAL at 08:16

## 2022-07-22 RX ADMIN — Medication 325 MILLIGRAM(S): at 17:58

## 2022-07-22 RX ADMIN — Medication 325 MILLIGRAM(S): at 06:07

## 2022-07-22 RX ADMIN — INSULIN GLARGINE 30 UNIT(S): 100 INJECTION, SOLUTION SUBCUTANEOUS at 21:10

## 2022-07-22 RX ADMIN — HEPARIN SODIUM 5000 UNIT(S): 5000 INJECTION INTRAVENOUS; SUBCUTANEOUS at 21:11

## 2022-07-22 RX ADMIN — LIDOCAINE 15 MILLILITER(S): 4 CREAM TOPICAL at 17:58

## 2022-07-22 NOTE — PROGRESS NOTE ADULT - ASSESSMENT
66F Divehi speaking PMHx of HTN, HLD, DM 2  presenting with left hip pain x 1-2 days. Described as mild, achy, shooting pain down left buttocks to knee. Exacerbated w/ standing and position. Pain level is 8/10 in intensity, not getting better after multiple doses of pain meds in ER. Otherwise, the patient denies any neck pain, headaches, chest pain, shortness of breath, n/v/d, abdominal pain, recent illness, fevers, chills, recent spinal/back surgeries or procedures, bowel or bladder incontinence, bowel or bladder retention, constipation, IV drug use, known cancer history, recent weight loss, trauma, weakness, other subjective neurological deficits, numbness/tingling, dysuria, hematuria, rashes. Ct Abd/Pelvis and CT Lumbar Spine :Mild motion artifact in the abdomen. No evidence of acute abdominal or pelvic abnormality. No evidence of acute lumbar spine abnormality. Mild degenerative changes of the lower lumbar spine. US negative for DVT.     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   -CT Lumbar Spine: L3-4: Disc bulge slightly asymmetric to the left. Mild bilateral facet   arthropathy. Mild spinal canal stenosis and minimal left neural foraminal   narrowing.  L4-5: Moderate disc bulge, mild bilateral facet arthropathy and   thickening of ligamenta flava. Moderate to severe spinal canal stenosis.   Mild to moderate left greater than right neural foraminal narrowing.  L5-S1: Mild disc bulge. Moderate bilateral facet arthropathy.   Mild/moderate spinal canal stenosis. Mass effect on the traversing   bilateral S1 nerve roots. Mild bilateral neural foraminal narrowing.  Orthopedic on board   7/14 L4-5 decompression, TLIF ( transforaminal lumbar interbody fusion)  -cont w/ analgesics prn   -cont w/  lumosacral orthosis brace; MAIA on discharge     Acute Toxic Metabolic Encephalopathy   per my colleague's documentation was presumed secondary to opioid  Head CT: no acute changes    cont to monitor mentation with any  opioid use     DM II   HgA1c: 8.4% uncontrolled   cont w/  lantus and prandial insulin   cont w/ FS monitoring w/ insulin s/s coverage for now   7/20/2022 FS still elevated will d/w endocrine    CHRIS-on Stage 2 chronic kidney disease  avoid nephrotoxic medications, renal dose meds   cont to monitor renal function as needed    7/20/2022 at baseline  7/21/2022 voiding per nurse patton was removed     Hypertension   cont w/ Metoprolol.    Dyslipidemia.   cont w/ Atorvastatin.    DVTp: heparin   Disposition:  pending MAIA acceptance     Rob Winter [phone]

## 2022-07-22 NOTE — PROGRESS NOTE ADULT - SUBJECTIVE AND OBJECTIVE BOX
Patient is a 66y old  Female who presents with a chief complaint of Left leg/thigh pain. (23 Jul 2022 13:36)      Interval History: finger sticks are with fluctuations   on Lantus and prandial lispro     MEDICATIONS  (STANDING):  atorvastatin Oral Tab/Cap - Peds 10 milliGRAM(s) Oral daily  cyclobenzaprine 10 milliGRAM(s) Oral every 8 hours  dextrose 5%. 1000 milliLiter(s) (50 mL/Hr) IV Continuous <Continuous>  dextrose 5%. 1000 milliLiter(s) (100 mL/Hr) IV Continuous <Continuous>  dextrose 5%. 1000 milliLiter(s) (50 mL/Hr) IV Continuous <Continuous>  dextrose 50% Injectable 25 Gram(s) IV Push once  dextrose 50% Injectable 25 Gram(s) IV Push once  dextrose 50% Injectable 12.5 Gram(s) IV Push once  dextrose 50% Injectable 25 Gram(s) IV Push once  ferrous    sulfate 325 milliGRAM(s) Oral two times a day  glucagon  Injectable 1 milliGRAM(s) IntraMuscular once  glucagon  Injectable 1 milliGRAM(s) IntraMuscular once  heparin   Injectable 5000 Unit(s) SubCutaneous every 8 hours  insulin glargine Injectable (LANTUS) 30 Unit(s) SubCutaneous at bedtime  insulin lispro (ADMELOG) corrective regimen sliding scale   SubCutaneous three times a day before meals  insulin lispro (ADMELOG) corrective regimen sliding scale   SubCutaneous at bedtime  insulin lispro Injectable (ADMELOG) 10 Unit(s) SubCutaneous three times a day before meals  metFORMIN 500 milliGRAM(s) Oral daily  metoprolol succinate ER 25 milliGRAM(s) Oral daily  montelukast 10 milliGRAM(s) Oral daily  naloxone Injectable 0.4 milliGRAM(s) IV Push once  pantoprazole    Tablet 40 milliGRAM(s) Oral before breakfast  polyethylene glycol 3350 17 Gram(s) Oral daily  senna 2 Tablet(s) Oral at bedtime  sodium chloride 0.9%. 1000 milliLiter(s) (75 mL/Hr) IV Continuous <Continuous>  vitamin A &amp; D Ointment 1 Application(s) Topical two times a day    MEDICATIONS  (PRN):  acetaminophen     Tablet .. 650 milliGRAM(s) Oral every 6 hours PRN Temp greater or equal to 38C (100.4F), Mild Pain (1 - 3)  benzocaine 15 mG/menthol 3.6 mG Lozenge 1 Lozenge Oral three times a day PRN Sore Throat  bisacodyl 5 milliGRAM(s) Oral every 12 hours PRN Constipation  dextrose Oral Gel 15 Gram(s) Oral once PRN Blood Glucose LESS THAN 70 milliGRAM(s)/deciliter  dextrose Oral Gel 15 Gram(s) Oral once PRN Blood Glucose LESS THAN 70 milliGRAM(s)/deciliter  magnesium hydroxide Suspension 30 milliLiter(s) Oral every 12 hours PRN Constipation  melatonin 3 milliGRAM(s) Oral at bedtime PRN Insomnia  ondansetron Injectable 4 milliGRAM(s) IV Push every 6 hours PRN Nausea and/or Vomiting      Allergies    No Known Allergies    Intolerances        REVIEW OF SYSTEMS:  CONSTITUTIONAL: no changes  EYES: No eye pain, visual disturbances, or discharge  ENMT:  No difficulty hearing, No sinus or throat pain  NECK: No pain or stiffness  RESPIRATORY: No cough, wheezing, chills or hemoptysis; No shortness of breath  CARDIOVASCULAR: No chest pain, palpitations or leg swelling  GASTROINTESTINAL: No abdominal or epigastric pain. No nausea, vomiting, or hematemesis; No diarrhea or constipation. No melena or hematochezia.  GENITOURINARY: No dysuria, frequency, hematuria, or incontinence  NEUROLOGICAL: No headaches, memory loss, loss of strength, numbness, or tremors  SKIN: No itching, burning, rashes, or lesions   ENDOCRINE: No heat or cold intolerance; No hair loss  MUSCULOSKELETAL: No joint pain or swelling; No muscle, back, or extremity pain  PSYCHIATRIC: No depression, anxiety, mood swings, or difficulty sleeping  HEME/LYMPH: No easy bruising, or bleeding gums  ALLERY AND IMMUNOLOGIC: No hives or eczema    Vital Signs Last 24 Hrs  T(C): 36.3 (23 Jul 2022 11:27), Max: 36.8 (22 Jul 2022 17:39)  T(F): 97.4 (23 Jul 2022 11:27), Max: 98.3 (22 Jul 2022 17:39)  HR: 95 (23 Jul 2022 11:27) (84 - 99)  BP: 143/84 (23 Jul 2022 11:27) (139/75 - 152/79)  BP(mean): --  RR: 18 (23 Jul 2022 11:27) (16 - 18)  SpO2: 98% (23 Jul 2022 11:27) (98% - 100%)        PHYSICAL EXAM:  GENERAL:   HEAD: Atraumatic, Normocephalic  EYES: PERRLA, conjunctiva and sclera clear  ENMT: No  exudates,; Moist mucous membranes,, No lesions  NECK: Supple, No JVD, Normal thyroid  NERVOUS SYSTEM:  Alert & Oriented,   CHEST/LUNG: Clear to auscultation bilaterally; No rales, rhonchi, wheezing, or rubs  HEART: Regular rate and rhythm; No murmurs, rubs, or gallops  ABDOMEN: Soft, Nontender, Nondistended; Bowel sounds present  EXTREMITIES:  2+ Peripheral Pulses, no edema  SKIN: No rashes or lesions    LABS:        CAPILLARY BLOOD GLUCOSE      POCT Blood Glucose.: 247 mg/dL (23 Jul 2022 10:54)  POCT Blood Glucose.: 156 mg/dL (23 Jul 2022 07:47)  POCT Blood Glucose.: 188 mg/dL (22 Jul 2022 20:40)  POCT Blood Glucose.: 237 mg/dL (22 Jul 2022 15:29)    Lipid panel:           Thyroid:  Diabetes Tests:  Parathyroid Panel:  Adrenals:  RADIOLOGY & ADDITIONAL TESTS:    Imaging Personally Reviewed:  [ ] YES  [ ] NO    Consultant(s) Notes Reviewed:  [ ] YES  [ ] NO    Care Discussed with Consultants/Other Providers [ ] YES  [ ] NO

## 2022-07-22 NOTE — PROGRESS NOTE ADULT - SUBJECTIVE AND OBJECTIVE BOX
Patient seen and examined at bedside. Pain well controlled with medication. Patient denies any numbness, tingling, weakness, or any other orthopaedic complaint. Denies N/V/CP/SOB.         VITAL SIGNS:  T(C): 36.8 (07-22-22 @ 05:37), Max: 36.9 (07-21-22 @ 23:25)  HR: 89 (07-22-22 @ 08:51) (88 - 97)  BP: 152/73 (07-22-22 @ 08:51) (144/72 - 161/74)  RR: 18 (07-22-22 @ 08:51) (16 - 18)  SpO2: 98% (07-22-22 @ 08:51) (97% - 98%)        LABS:                        9.9    10.45 )-----------( 328      ( 21 Jul 2022 05:15 )             29.4     07-21    135  |  104  |  14  ----------------------------<  273<H>  3.7   |  21<L>  |  0.99    Ca    7.7<L>      21 Jul 2022 05:15          PE  General: awake, alert     Spine:  dressing CDI  +mild dee dee-incisional TTP  + radial pulses  + DP Pulses    Motor:                   C5                C6              C7               C8           T1   R             5/5                5/5            5/5              5/5          5/5  L             5/5                5/5            5/5              5/5          5/5                    L2                  L3             L4              L5            S1  R            5/5                5/5             5/5            5/5          5/5  L             5/5                5/5            5/5            5/5          5/5    Sensory:            C5         C6         C7      C8       T1        (0=absent, 1=impaired, 2=normal, NT=not testable)  R         2            2           2        2         2  L          2            2           2        2         2               L2          L3         L4      L5       S1         (0=absent, 1=impaired, 2=normal, NT=not testable)  R         2            2            2        2        2  L          2            2           2        2         2        A/P:  66y F s/p L4-5 decompression, TLIF POD 8    -Patient passed TOV yesterday, 7/22, no further patton needed  -Medical management appreciated  -PT/OT   -WBAT  -Pain Control PRN  -DVT ppx - HOLD ALL CHEMICAL DVT PPX s/p spine surgery  -FU AM Labs  -Incentive Spirometry  -Urology consulted, recs appreciated, f/u outpatient for further management of neurogenic bladder  -Bladder function appears to be gradually improving, albeit slowly; contractility improving based upon PVRs, however component of overflow incontinence could be contributing  -pt may require long term patton if bladder functions continues to not improve, family aware it could take months, discussed possibility of it not returning  -f/u with urology outpatient for patton management and monitoring return of bladder function  -Continue with Bowel Regimen   -No further acute orthopaedic surgical intervention indicated. Orthopaedically stable for d/c w/ outpatient followup   -Will Discuss with attending Dr. Christopher and advise if any changes to plan Patient seen and examined at bedside. Pain well controlled with medication. Patient denies any numbness, tingling, weakness, or any other orthopaedic complaint. Denies N/V/CP/SOB.         VITAL SIGNS:  T(C): 36.8 (07-22-22 @ 05:37), Max: 36.9 (07-21-22 @ 23:25)  HR: 89 (07-22-22 @ 08:51) (88 - 97)  BP: 152/73 (07-22-22 @ 08:51) (144/72 - 161/74)  RR: 18 (07-22-22 @ 08:51) (16 - 18)  SpO2: 98% (07-22-22 @ 08:51) (97% - 98%)        LABS:                        9.9    10.45 )-----------( 328      ( 21 Jul 2022 05:15 )             29.4     07-21    135  |  104  |  14  ----------------------------<  273<H>  3.7   |  21<L>  |  0.99    Ca    7.7<L>      21 Jul 2022 05:15          PE  General: awake, alert     Spine:  dressing CDI  +mild dee dee-incisional TTP  + radial pulses  + DP Pulses    Motor:                   C5                C6              C7               C8           T1   R             5/5                5/5            5/5              5/5          5/5  L             5/5                5/5            5/5              5/5          5/5                    L2                  L3             L4              L5            S1  R            5/5                5/5             5/5            5/5          5/5  L             5/5                5/5            5/5            5/5          5/5    Sensory:            C5         C6         C7      C8       T1        (0=absent, 1=impaired, 2=normal, NT=not testable)  R         2            2           2        2         2  L          2            2           2        2         2               L2          L3         L4      L5       S1         (0=absent, 1=impaired, 2=normal, NT=not testable)  R         2            2            2        2        2  L          2            2           2        2         2        A/P:  66y F s/p L4-5 decompression, TLIF POD 8    -Patient passed TOV yesterday, 7/22, no further patton needed  -Medical management appreciated  -PT/OT   -WBAT  -Pain Control PRN  -DVT ppx - OK for SubQ heparin 5000U q8 hrs   -Encourage ambulation / SCDs  -FU AM Labs  -Incentive Spirometry  -Urology consulted, recs appreciated, f/u outpatient for further management of neurogenic bladder  -Bladder function appears to be gradually improving, albeit slowly; contractility improving based upon PVRs, however component of overflow incontinence could be contributing  -pt may require long term patton if bladder functions continues to not improve, family aware it could take months, discussed possibility of it not returning  -f/u with urology outpatient for continued management and monitoring  -Continue with Bowel Regimen   -Patton removed 7/21, patient passed TOV  -Patient doing much better, ambulating with significantly decreased pain/ discomfort   -No further acute orthopaedic surgical intervention indicated. Orthopaedically stable for d/c w/ outpatient followup   -Will Discuss with attending Dr. Christopher and advise if any changes to plan

## 2022-07-22 NOTE — PROGRESS NOTE ADULT - SUBJECTIVE AND OBJECTIVE BOX
Patient is a 66y old  Female who presents with a chief complaint of Left leg/thigh pain. (19 Jul 2022 05:54)    L[place]ian      INTERVAL HPI/ OVERNIGHT EVENTS: Pt was seen and examined at bedside today,    MEDICATIONS  (STANDING):  atorvastatin Oral Tab/Cap - Peds 10 milliGRAM(s) Oral daily  cyclobenzaprine 10 milliGRAM(s) Oral every 8 hours  dextrose 5%. 1000 milliLiter(s) (50 mL/Hr) IV Continuous <Continuous>  dextrose 5%. 1000 milliLiter(s) (100 mL/Hr) IV Continuous <Continuous>  dextrose 5%. 1000 milliLiter(s) (50 mL/Hr) IV Continuous <Continuous>  dextrose 50% Injectable 25 Gram(s) IV Push once  dextrose 50% Injectable 25 Gram(s) IV Push once  dextrose 50% Injectable 12.5 Gram(s) IV Push once  dextrose 50% Injectable 25 Gram(s) IV Push once  ferrous    sulfate 325 milliGRAM(s) Oral two times a day  glucagon  Injectable 1 milliGRAM(s) IntraMuscular once  glucagon  Injectable 1 milliGRAM(s) IntraMuscular once  insulin glargine Injectable (LANTUS) 30 Unit(s) SubCutaneous at bedtime  insulin lispro (ADMELOG) corrective regimen sliding scale   SubCutaneous three times a day before meals  insulin lispro (ADMELOG) corrective regimen sliding scale   SubCutaneous at bedtime  insulin lispro Injectable (ADMELOG) 10 Unit(s) SubCutaneous three times a day before meals  lidocaine 2% Viscous 15 milliLiter(s) Swish and Spit two times a day  metoprolol succinate ER 25 milliGRAM(s) Oral daily  montelukast 10 milliGRAM(s) Oral daily  naloxone Injectable 0.4 milliGRAM(s) IV Push once  pantoprazole    Tablet 40 milliGRAM(s) Oral before breakfast  polyethylene glycol 3350 17 Gram(s) Oral daily  senna 2 Tablet(s) Oral at bedtime  sodium chloride 0.9%. 1000 milliLiter(s) (75 mL/Hr) IV Continuous <Continuous>  vitamin A &amp; D Ointment 1 Application(s) Topical two times a day    MEDICATIONS  (PRN):  acetaminophen     Tablet .. 650 milliGRAM(s) Oral every 6 hours PRN Temp greater or equal to 38C (100.4F), Mild Pain (1 - 3)  benzocaine 15 mG/menthol 3.6 mG Lozenge 1 Lozenge Oral three times a day PRN Sore Throat  bisacodyl 5 milliGRAM(s) Oral every 12 hours PRN Constipation  dextrose Oral Gel 15 Gram(s) Oral once PRN Blood Glucose LESS THAN 70 milliGRAM(s)/deciliter  dextrose Oral Gel 15 Gram(s) Oral once PRN Blood Glucose LESS THAN 70 milliGRAM(s)/deciliter  HYDROmorphone  Injectable 1 milliGRAM(s) IV Push every 4 hours PRN Severe Pain (7 - 10)  magnesium hydroxide Suspension 30 milliLiter(s) Oral every 12 hours PRN Constipation  melatonin 3 milliGRAM(s) Oral at bedtime PRN Insomnia  ondansetron Injectable 4 milliGRAM(s) IV Push every 6 hours PRN Nausea and/or Vomiting  oxycodone    5 mG/acetaminophen 325 mG 2 Tablet(s) Oral every 4 hours PRN Moderate Pain (4 - 6)      Allergies    No Known Allergies    Intolerances  Vital Signs Last 24 Hrs  T(C): 36.6 (23 Jul 2022 05:23), Max: 36.8 (22 Jul 2022 17:39)  T(F): 97.8 (23 Jul 2022 05:23), Max: 98.3 (22 Jul 2022 17:39)  HR: 84 (23 Jul 2022 05:23) (84 - 99)  BP: 152/79 (23 Jul 2022 05:23) (122/62 - 152/79)  BP(mean): --  RR: 18 (23 Jul 2022 05:23) (16 - 18)  SpO2: 98% (23 Jul 2022 05:23) (93% - 100%)    Parameters below as of 22 Jul 2022 11:46  Patient On (Oxygen Delivery Method): room air        PHYSICAL EXAM:  GENERAL: NAD on RA,  well-developed, well-groomed  HEAD:  Atraumatic, Normocephalic  EYES: conjunctiva and sclera clear  ENMT: Moist mucous membranes  NECK: Supple,   CHEST/LUNG: Clear to Auscultation bilaterally; No rales, rhonchi, wheezing, or rubs  HEART: Regular rate and rhythm; No murmurs, rubs, or gallops  ABDOMEN: Soft, Nontender, Nondistended; Bowel sounds present  EXTREMITIES: 2+ Peripheral Pulses, No clubbing, cyanosis, or edema  SKIN: Lumbar surgical wound   NERVOUS SYSTEM:  Alert & Oriented X3, Good concentration; Motor Strength 4/5 B/L upper extremities, Lower extremity strength unable to be assess due to severe pain     LABS:                                     9.9    10.45 )-----------( 328      ( 21 Jul 2022 05:15 )             29.4     07-21    135  |  104  |  14  ----------------------------<  273<H>  3.7   |  21<L>  |  0.99    Ca    7.7<L>      21 Jul 2022 05:15  Phos  2.5     07-20  Mg     2.1     07-20                Culture - Urine (collected 07-12-22)  Source: Clean Catch Clean Catch (Midstream)  Final Report (07-14-22):    <10,000 CFU/mL Normal Urogenital [place]        RADIOLOGY & ADDITIONAL TESTS:          Imaging Personally Reviewed:  [ ] YES  [ ] NO    Consultant(s) Notes Reviewed:  [ ] YES  [ ] NO    Care Discussed with Consultants/Other Providers [x ] YES  [ ] NO

## 2022-07-23 LAB
GLUCOSE BLDC GLUCOMTR-MCNC: 156 MG/DL — HIGH (ref 70–99)
GLUCOSE BLDC GLUCOMTR-MCNC: 247 MG/DL — HIGH (ref 70–99)
GLUCOSE BLDC GLUCOMTR-MCNC: 287 MG/DL — HIGH (ref 70–99)
GLUCOSE BLDC GLUCOMTR-MCNC: 94 MG/DL — SIGNIFICANT CHANGE UP (ref 70–99)

## 2022-07-23 PROCEDURE — 99232 SBSQ HOSP IP/OBS MODERATE 35: CPT

## 2022-07-23 RX ORDER — LIDOCAINE 4 G/100G
15 CREAM TOPICAL
Refills: 0 | Status: COMPLETED | OUTPATIENT
Start: 2022-07-23 | End: 2022-07-25

## 2022-07-23 RX ADMIN — CYCLOBENZAPRINE HYDROCHLORIDE 10 MILLIGRAM(S): 10 TABLET, FILM COATED ORAL at 05:56

## 2022-07-23 RX ADMIN — Medication 325 MILLIGRAM(S): at 05:56

## 2022-07-23 RX ADMIN — Medication 2: at 07:51

## 2022-07-23 RX ADMIN — Medication 1 APPLICATION(S): at 06:11

## 2022-07-23 RX ADMIN — LIDOCAINE 15 MILLILITER(S): 4 CREAM TOPICAL at 05:55

## 2022-07-23 RX ADMIN — Medication 1: at 21:39

## 2022-07-23 RX ADMIN — Medication 10 UNIT(S): at 07:51

## 2022-07-23 RX ADMIN — CYCLOBENZAPRINE HYDROCHLORIDE 10 MILLIGRAM(S): 10 TABLET, FILM COATED ORAL at 21:39

## 2022-07-23 RX ADMIN — CYCLOBENZAPRINE HYDROCHLORIDE 10 MILLIGRAM(S): 10 TABLET, FILM COATED ORAL at 13:10

## 2022-07-23 RX ADMIN — Medication 325 MILLIGRAM(S): at 17:45

## 2022-07-23 RX ADMIN — HEPARIN SODIUM 5000 UNIT(S): 5000 INJECTION INTRAVENOUS; SUBCUTANEOUS at 05:56

## 2022-07-23 RX ADMIN — HEPARIN SODIUM 5000 UNIT(S): 5000 INJECTION INTRAVENOUS; SUBCUTANEOUS at 13:10

## 2022-07-23 RX ADMIN — SODIUM CHLORIDE 75 MILLILITER(S): 9 INJECTION INTRAMUSCULAR; INTRAVENOUS; SUBCUTANEOUS at 15:55

## 2022-07-23 RX ADMIN — Medication 25 MILLIGRAM(S): at 05:55

## 2022-07-23 RX ADMIN — Medication 10 UNIT(S): at 11:27

## 2022-07-23 RX ADMIN — PANTOPRAZOLE SODIUM 40 MILLIGRAM(S): 20 TABLET, DELAYED RELEASE ORAL at 07:53

## 2022-07-23 RX ADMIN — Medication 10 UNIT(S): at 15:55

## 2022-07-23 RX ADMIN — Medication 1 APPLICATION(S): at 17:51

## 2022-07-23 RX ADMIN — LIDOCAINE 15 MILLILITER(S): 4 CREAM TOPICAL at 20:18

## 2022-07-23 RX ADMIN — HEPARIN SODIUM 5000 UNIT(S): 5000 INJECTION INTRAVENOUS; SUBCUTANEOUS at 21:44

## 2022-07-23 RX ADMIN — MONTELUKAST 10 MILLIGRAM(S): 4 TABLET, CHEWABLE ORAL at 11:26

## 2022-07-23 RX ADMIN — POLYETHYLENE GLYCOL 3350 17 GRAM(S): 17 POWDER, FOR SOLUTION ORAL at 11:37

## 2022-07-23 RX ADMIN — SENNA PLUS 2 TABLET(S): 8.6 TABLET ORAL at 21:44

## 2022-07-23 RX ADMIN — Medication 4: at 11:27

## 2022-07-23 RX ADMIN — INSULIN GLARGINE 30 UNIT(S): 100 INJECTION, SOLUTION SUBCUTANEOUS at 21:38

## 2022-07-23 RX ADMIN — METFORMIN HYDROCHLORIDE 500 MILLIGRAM(S): 850 TABLET ORAL at 11:26

## 2022-07-23 RX ADMIN — ATORVASTATIN CALCIUM 10 MILLIGRAM(S): 80 TABLET, FILM COATED ORAL at 11:26

## 2022-07-23 NOTE — PROGRESS NOTE ADULT - SUBJECTIVE AND OBJECTIVE BOX
Patient is a 66y old  Female who presents with a chief complaint of Left leg/thigh pain. (19 Jul 2022 05:54)    [place]      INTERVAL HPI/ OVERNIGHT EVENTS: Pt was seen and examined at bedside today,    MEDICATIONS  (STANDING):  atorvastatin Oral Tab/Cap - Peds 10 milliGRAM(s) Oral daily  cyclobenzaprine 10 milliGRAM(s) Oral every 8 hours  dextrose 5%. 1000 milliLiter(s) (50 mL/Hr) IV Continuous <Continuous>  dextrose 5%. 1000 milliLiter(s) (100 mL/Hr) IV Continuous <Continuous>  dextrose 5%. 1000 milliLiter(s) (50 mL/Hr) IV Continuous <Continuous>  dextrose 50% Injectable 25 Gram(s) IV Push once  dextrose 50% Injectable 25 Gram(s) IV Push once  dextrose 50% Injectable 12.5 Gram(s) IV Push once  dextrose 50% Injectable 25 Gram(s) IV Push once  ferrous    sulfate 325 milliGRAM(s) Oral two times a day  glucagon  Injectable 1 milliGRAM(s) IntraMuscular once  glucagon  Injectable 1 milliGRAM(s) IntraMuscular once  heparin   Injectable 5000 Unit(s) SubCutaneous every 8 hours  insulin glargine Injectable (LANTUS) 30 Unit(s) SubCutaneous at bedtime  insulin lispro (ADMELOG) corrective regimen sliding scale   SubCutaneous three times a day before meals  insulin lispro (ADMELOG) corrective regimen sliding scale   SubCutaneous at bedtime  insulin lispro Injectable (ADMELOG) 10 Unit(s) SubCutaneous three times a day before meals  metFORMIN 500 milliGRAM(s) Oral daily  metoprolol succinate ER 25 milliGRAM(s) Oral daily  montelukast 10 milliGRAM(s) Oral daily  naloxone Injectable 0.4 milliGRAM(s) IV Push once  pantoprazole    Tablet 40 milliGRAM(s) Oral before breakfast  polyethylene glycol 3350 17 Gram(s) Oral daily  senna 2 Tablet(s) Oral at bedtime  sodium chloride 0.9%. 1000 milliLiter(s) (75 mL/Hr) IV Continuous <Continuous>  vitamin A &amp; D Ointment 1 Application(s) Topical two times a day    MEDICATIONS  (PRN):  acetaminophen     Tablet .. 650 milliGRAM(s) Oral every 6 hours PRN Temp greater or equal to 38C (100.4F), Mild Pain (1 - 3)  benzocaine 15 mG/menthol 3.6 mG Lozenge 1 Lozenge Oral three times a day PRN Sore Throat  bisacodyl 5 milliGRAM(s) Oral every 12 hours PRN Constipation  dextrose Oral Gel 15 Gram(s) Oral once PRN Blood Glucose LESS THAN 70 milliGRAM(s)/deciliter  dextrose Oral Gel 15 Gram(s) Oral once PRN Blood Glucose LESS THAN 70 milliGRAM(s)/deciliter  magnesium hydroxide Suspension 30 milliLiter(s) Oral every 12 hours PRN Constipation  melatonin 3 milliGRAM(s) Oral at bedtime PRN Insomnia  ondansetron Injectable 4 milliGRAM(s) IV Push every 6 hours PRN Nausea and/or Vomiting      Allergies    No Known Allergies    Intolerances    Vital Signs Last 24 Hrs  T(C): 36.3 (23 Jul 2022 11:27), Max: 36.8 (22 Jul 2022 17:39)  T(F): 97.4 (23 Jul 2022 11:27), Max: 98.3 (22 Jul 2022 17:39)  HR: 95 (23 Jul 2022 11:27) (84 - 99)  BP: 143/84 (23 Jul 2022 11:27) (139/75 - 152/79)  BP(mean): --  RR: 18 (23 Jul 2022 11:27) (16 - 18)  SpO2: 98% (23 Jul 2022 11:27) (98% - 100%)      PHYSICAL EXAM:  GENERAL: NAD on RA,  well-developed, well-groomed  HEAD:  Atraumatic, Normocephalic  EYES: conjunctiva and sclera clear  ENMT: Moist mucous membranes  NECK: Supple,   CHEST/LUNG: Clear to Auscultation bilaterally; No rales, rhonchi, wheezing, or rubs  HEART: Regular rate and rhythm; No murmurs, rubs, or gallops  ABDOMEN: Soft, Nontender, Nondistended; Bowel sounds present  EXTREMITIES: 2+ Peripheral Pulses, No clubbing, cyanosis, or edema  SKIN: Lumbar surgical wound   NERVOUS SYSTEM:  Alert & Oriented X3, Good concentration; Motor Strength 4/5 B/L upper extremities, Lower extremity strength unable to be assess due to severe pain     LABS:                                    Culture - Urine (collected 07-12-22)  Source: Clean Catch Clean Catch (Midstream)  Final Report (07-14-22):    <10,000 CFU/mL Normal Urogenital [place]        RADIOLOGY & ADDITIONAL TESTS:          Imaging Personally Reviewed:  [ ] YES  [ ] NO    Consultant(s) Notes Reviewed:  [ ] YES  [ ] NO    Care Discussed with Consultants/Other Providers [x ] YES  [ ] NO Patient is a 66y old  Female who presents with a chief complaint of Left leg/thigh pain. (19 Jul 2022 05:54)    [place]  [id number]    INTERVAL HPI/ OVERNIGHT EVENTS: Pt was seen and examined at bedside today,    MEDICATIONS  (STANDING):  atorvastatin Oral Tab/Cap - Peds 10 milliGRAM(s) Oral daily  cyclobenzaprine 10 milliGRAM(s) Oral every 8 hours  dextrose 5%. 1000 milliLiter(s) (50 mL/Hr) IV Continuous <Continuous>  dextrose 5%. 1000 milliLiter(s) (100 mL/Hr) IV Continuous <Continuous>  dextrose 5%. 1000 milliLiter(s) (50 mL/Hr) IV Continuous <Continuous>  dextrose 50% Injectable 25 Gram(s) IV Push once  dextrose 50% Injectable 25 Gram(s) IV Push once  dextrose 50% Injectable 12.5 Gram(s) IV Push once  dextrose 50% Injectable 25 Gram(s) IV Push once  ferrous    sulfate 325 milliGRAM(s) Oral two times a day  glucagon  Injectable 1 milliGRAM(s) IntraMuscular once  glucagon  Injectable 1 milliGRAM(s) IntraMuscular once  heparin   Injectable 5000 Unit(s) SubCutaneous every 8 hours  insulin glargine Injectable (LANTUS) 30 Unit(s) SubCutaneous at bedtime  insulin lispro (ADMELOG) corrective regimen sliding scale   SubCutaneous three times a day before meals  insulin lispro (ADMELOG) corrective regimen sliding scale   SubCutaneous at bedtime  insulin lispro Injectable (ADMELOG) 10 Unit(s) SubCutaneous three times a day before meals  metFORMIN 500 milliGRAM(s) Oral daily  metoprolol succinate ER 25 milliGRAM(s) Oral daily  montelukast 10 milliGRAM(s) Oral daily  naloxone Injectable 0.4 milliGRAM(s) IV Push once  pantoprazole    Tablet 40 milliGRAM(s) Oral before breakfast  polyethylene glycol 3350 17 Gram(s) Oral daily  senna 2 Tablet(s) Oral at bedtime  sodium chloride 0.9%. 1000 milliLiter(s) (75 mL/Hr) IV Continuous <Continuous>  vitamin A &amp; D Ointment 1 Application(s) Topical two times a day    MEDICATIONS  (PRN):  acetaminophen     Tablet .. 650 milliGRAM(s) Oral every 6 hours PRN Temp greater or equal to 38C (100.4F), Mild Pain (1 - 3)  benzocaine 15 mG/menthol 3.6 mG Lozenge 1 Lozenge Oral three times a day PRN Sore Throat  bisacodyl 5 milliGRAM(s) Oral every 12 hours PRN Constipation  dextrose Oral Gel 15 Gram(s) Oral once PRN Blood Glucose LESS THAN 70 milliGRAM(s)/deciliter  dextrose Oral Gel 15 Gram(s) Oral once PRN Blood Glucose LESS THAN 70 milliGRAM(s)/deciliter  magnesium hydroxide Suspension 30 milliLiter(s) Oral every 12 hours PRN Constipation  melatonin 3 milliGRAM(s) Oral at bedtime PRN Insomnia  ondansetron Injectable 4 milliGRAM(s) IV Push every 6 hours PRN Nausea and/or Vomiting      Allergies    No Known Allergies    Intolerances    Vital Signs Last 24 Hrs  T(C): 36.3 (23 Jul 2022 11:27), Max: 36.8 (22 Jul 2022 17:39)  T(F): 97.4 (23 Jul 2022 11:27), Max: 98.3 (22 Jul 2022 17:39)  HR: 95 (23 Jul 2022 11:27) (84 - 99)  BP: 143/84 (23 Jul 2022 11:27) (139/75 - 152/79)  BP(mean): --  RR: 18 (23 Jul 2022 11:27) (16 - 18)  SpO2: 98% (23 Jul 2022 11:27) (98% - 100%)      PHYSICAL EXAM:  GENERAL: NAD on RA,  well-developed, well-groomed  HEAD:  Atraumatic, Normocephalic  EYES: conjunctiva and sclera clear  ENMT: Moist mucous membranes  NECK: Supple,   CHEST/LUNG: Clear to Auscultation bilaterally; No rales, rhonchi, wheezing, or rubs  HEART: Regular rate and rhythm; No murmurs, rubs, or gallops  ABDOMEN: Soft, Nontender, Nondistended; Bowel sounds present  EXTREMITIES: 2+ Peripheral Pulses, No clubbing, cyanosis, or edema  SKIN: Lumbar surgical wound   NERVOUS SYSTEM:  Alert & Oriented X3, Good concentration; Motor Strength 4/5 B/L upper extremities, Lower extremity strength unable to be assess due to severe pain     LABS:                                    Culture - Urine (collected 07-12-22)  Source: Clean Catch Clean Catch (Midstream)  Final Report (07-14-22):    <10,000 CFU/mL Normal Urogenital [place]        RADIOLOGY & ADDITIONAL TESTS:          Imaging Personally Reviewed:  [ ] YES  [ ] NO    Consultant(s) Notes Reviewed:  [ ] YES  [ ] NO    Care Discussed with Consultants/Other Providers [x ] YES  [ ] NO

## 2022-07-23 NOTE — PROGRESS NOTE ADULT - ASSESSMENT
66F Sinhala speaking PMHx of HTN, HLD, DM 2  presenting with left hip pain x 1-2 days. Described as mild, achy, shooting pain down left buttocks to knee. Exacerbated w/ standing and position. Pain level is 8/10 in intensity, not getting better after multiple doses of pain meds in ER. Otherwise, the patient denies any neck pain, headaches, chest pain, shortness of breath, n/v/d, abdominal pain, recent illness, fevers, chills, recent spinal/back surgeries or procedures, bowel or bladder incontinence, bowel or bladder retention, constipation, IV drug use, known cancer history, recent weight loss, trauma, weakness, other subjective neurological deficits, numbness/tingling, dysuria, hematuria, rashes. Ct Abd/Pelvis and CT Lumbar Spine :Mild motion artifact in the abdomen. No evidence of acute abdominal or pelvic abnormality. No evidence of acute lumbar spine abnormality. Mild degenerative changes of the lower lumbar spine. US negative for DVT.     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   -CT Lumbar Spine: L3-4: Disc bulge slightly asymmetric to the left. Mild bilateral facet   arthropathy. Mild spinal canal stenosis and minimal left neural foraminal   narrowing.  L4-5: Moderate disc bulge, mild bilateral facet arthropathy and   thickening of ligamenta flava. Moderate to severe spinal canal stenosis.   Mild to moderate left greater than right neural foraminal narrowing.  L5-S1: Mild disc bulge. Moderate bilateral facet arthropathy.   Mild/moderate spinal canal stenosis. Mass effect on the traversing   bilateral S1 nerve roots. Mild bilateral neural foraminal narrowing.  Orthopedic on board   7/14 L4-5 decompression, TLIF ( transforaminal lumbar interbody fusion)  -cont w/ analgesics prn   -cont w/  lumosacral orthosis brace; [facility]IA on discharge   7/23/2022 appreciate ortho note    Acute Toxic Metabolic Encephalopathy   per my colleague's documentation was presumed secondary to opioid  Head CT: no acute changes    cont to monitor mentation with any  opioid use     DM II   HgA1c: 8.4% uncontrolled   cont w/  lantus and prandial insulin   cont w/ FS monitoring w/ insulin s/s coverage for now   7/20/2022 FS still elevated will d/w endocrine    CHRIS-on Stage 2 chronic kidney disease  avoid nephrotoxic medications, renal dose meds   cont to monitor renal function as needed    7/20/2022 at baseline  7/21/2022 voiding per nurse patton was removed     Hypertension   cont w/ Metoprolol.  7/23/2022 consider increase metoprolol    Dyslipidemia.   cont w/ Atorvastatin.    DVTp: heparin   Disposition:  pending MAIA acceptance     Rob Winter [phone]

## 2022-07-23 NOTE — PROGRESS NOTE ADULT - SUBJECTIVE AND OBJECTIVE BOX
Patient seen and examined at bedside. Pain well controlled with medication, pt reports decreasing levels of pain. Patient denies any numbness, tingling, weakness, or any other orthopaedic complaint. Denies N/V/CP/SOB. Ambulating well with walker.      VITAL SIGNS:  T(C): 36.6 (07-23-22 @ 05:23), Max: 36.8 (07-22-22 @ 17:39)  HR: 84 (07-23-22 @ 05:23) (84 - 99)  BP: 152/79 (07-23-22 @ 05:23) (122/62 - 152/79)  RR: 18 (07-23-22 @ 05:23) (16 - 18)  SpO2: 98% (07-23-22 @ 05:23) (93% - 100%)      PE  General: awake, alert     Spine:  dressing CDI  +mild dee dee-incisional TTP  + radial pulses  + DP Pulses    LE Motor:                     L2                  L3             L4              L5            S1  R            5/5                5/5             5/5            5/5          5/5  L             5/5                5/5            5/5            5/5          5/5    LE Sensory:               L2          L3         L4      L5       S1         (0=absent, 1=impaired, 2=normal, NT=not testable)  R         2            2            2        2        2  L          2            2           2        2         2        A/P:  66y F s/p L4-5 decompression, TLIF POD 9    -Patient passed TOV yesterday, 7/22, no further patton needed  -Medical management appreciated  -PT/OT   -WBAT  -Pain Control PRN  -DVT ppx - OK for SubQ heparin 5000U q8 hrs   -Encourage ambulation / SCDs  -FU AM Labs  -Incentive Spirometry  -Urology consulted, recs appreciated, f/u outpatient for further management and monitoring if neurogenic bladder returns  -Continue with Bowel Regimen   -Patient doing much better, ambulating with significantly decreased pain/ discomfort   -No further acute orthopaedic surgical intervention indicated. Orthopaedically stable for d/c w/ outpatient followup   -Will Discuss with attending Dr. Christopher and advise if any changes to plan

## 2022-07-23 NOTE — PROGRESS NOTE ADULT - SUBJECTIVE AND OBJECTIVE BOX
Patient is a 66y old  Female who presents with a chief complaint of Left leg/thigh pain. (23 Jul 2022 07:19)      INTERVAL HPI/OVERNIGHT EVENTS:  pt with no complaints  eating well, good appetite    MEDICATIONS  (STANDING):  atorvastatin Oral Tab/Cap - Peds 10 milliGRAM(s) Oral daily  cyclobenzaprine 10 milliGRAM(s) Oral every 8 hours  dextrose 5%. 1000 milliLiter(s) (50 mL/Hr) IV Continuous <Continuous>  dextrose 5%. 1000 milliLiter(s) (100 mL/Hr) IV Continuous <Continuous>  dextrose 5%. 1000 milliLiter(s) (50 mL/Hr) IV Continuous <Continuous>  dextrose 50% Injectable 25 Gram(s) IV Push once  dextrose 50% Injectable 25 Gram(s) IV Push once  dextrose 50% Injectable 12.5 Gram(s) IV Push once  dextrose 50% Injectable 25 Gram(s) IV Push once  ferrous    sulfate 325 milliGRAM(s) Oral two times a day  glucagon  Injectable 1 milliGRAM(s) IntraMuscular once  glucagon  Injectable 1 milliGRAM(s) IntraMuscular once  heparin   Injectable 5000 Unit(s) SubCutaneous every 8 hours  insulin glargine Injectable (LANTUS) 30 Unit(s) SubCutaneous at bedtime  insulin lispro (ADMELOG) corrective regimen sliding scale   SubCutaneous three times a day before meals  insulin lispro (ADMELOG) corrective regimen sliding scale   SubCutaneous at bedtime  insulin lispro Injectable (ADMELOG) 10 Unit(s) SubCutaneous three times a day before meals  metFORMIN 500 milliGRAM(s) Oral daily  metoprolol succinate ER 25 milliGRAM(s) Oral daily  montelukast 10 milliGRAM(s) Oral daily  naloxone Injectable 0.4 milliGRAM(s) IV Push once  pantoprazole    Tablet 40 milliGRAM(s) Oral before breakfast  polyethylene glycol 3350 17 Gram(s) Oral daily  senna 2 Tablet(s) Oral at bedtime  sodium chloride 0.9%. 1000 milliLiter(s) (75 mL/Hr) IV Continuous <Continuous>  vitamin A &amp; D Ointment 1 Application(s) Topical two times a day    MEDICATIONS  (PRN):  acetaminophen     Tablet .. 650 milliGRAM(s) Oral every 6 hours PRN Temp greater or equal to 38C (100.4F), Mild Pain (1 - 3)  benzocaine 15 mG/menthol 3.6 mG Lozenge 1 Lozenge Oral three times a day PRN Sore Throat  bisacodyl 5 milliGRAM(s) Oral every 12 hours PRN Constipation  dextrose Oral Gel 15 Gram(s) Oral once PRN Blood Glucose LESS THAN 70 milliGRAM(s)/deciliter  dextrose Oral Gel 15 Gram(s) Oral once PRN Blood Glucose LESS THAN 70 milliGRAM(s)/deciliter  magnesium hydroxide Suspension 30 milliLiter(s) Oral every 12 hours PRN Constipation  melatonin 3 milliGRAM(s) Oral at bedtime PRN Insomnia  ondansetron Injectable 4 milliGRAM(s) IV Push every 6 hours PRN Nausea and/or Vomiting      REVIEW OF SYSTEMS:  CONSTITUTIONAL: No fever, weight loss, or fatigue  RESPIRATORY: No cough, wheezing, chills or hemoptysis; No shortness of breath  CARDIOVASCULAR: No chest pain, palpitations, dizziness, or leg swelling  GASTROINTESTINAL: No abdominal or epigastric pain. No nausea, vomiting, or hematemesis; No diarrhea or constipation. No melena or hematochezia.  ENDOCRINE: No heat or cold intolerance; No hair loss      Vital Signs Last 24 Hrs  T(C): 36.6 (23 Jul 2022 05:23), Max: 36.8 (22 Jul 2022 17:39)  T(F): 97.8 (23 Jul 2022 05:23), Max: 98.3 (22 Jul 2022 17:39)  HR: 84 (23 Jul 2022 05:23) (84 - 99)  BP: 152/79 (23 Jul 2022 05:23) (122/62 - 152/79)  BP(mean): --  RR: 18 (23 Jul 2022 05:23) (16 - 18)  SpO2: 98% (23 Jul 2022 05:23) (93% - 100%)    Parameters below as of 22 Jul 2022 11:46  Patient On (Oxygen Delivery Method): room air        PHYSICAL EXAM:  GENERAL: NAD, well-groomed, well-developed  CHEST/LUNG: Clear to percussion bilaterally; No rales, rhonchi, wheezing, or rubs  HEART: Regular rate and rhythm; No murmurs, rubs, or gallops  ABDOMEN: Soft, Nontender, Nondistended; Bowel sounds present      LABS:              CAPILLARY BLOOD GLUCOSE      POCT Blood Glucose.: 156 mg/dL (23 Jul 2022 07:47)  POCT Blood Glucose.: 188 mg/dL (22 Jul 2022 20:40)  POCT Blood Glucose.: 237 mg/dL (22 Jul 2022 15:29)  POCT Blood Glucose.: 349 mg/dL (22 Jul 2022 10:47)    Lipid panel:               RADIOLOGY & ADDITIONAL TESTS:

## 2022-07-24 LAB
APPEARANCE UR: CLEAR — SIGNIFICANT CHANGE UP
BACTERIA # UR AUTO: ABNORMAL
BILIRUB UR-MCNC: NEGATIVE — SIGNIFICANT CHANGE UP
COLOR SPEC: YELLOW — SIGNIFICANT CHANGE UP
DIFF PNL FLD: NEGATIVE — SIGNIFICANT CHANGE UP
EPI CELLS # UR: SIGNIFICANT CHANGE UP
GLUCOSE BLDC GLUCOMTR-MCNC: 119 MG/DL — HIGH (ref 70–99)
GLUCOSE BLDC GLUCOMTR-MCNC: 160 MG/DL — HIGH (ref 70–99)
GLUCOSE BLDC GLUCOMTR-MCNC: 182 MG/DL — HIGH (ref 70–99)
GLUCOSE BLDC GLUCOMTR-MCNC: 295 MG/DL — HIGH (ref 70–99)
GLUCOSE UR QL: NEGATIVE MG/DL — SIGNIFICANT CHANGE UP
KETONES UR-MCNC: NEGATIVE — SIGNIFICANT CHANGE UP
LEUKOCYTE ESTERASE UR-ACNC: ABNORMAL
NITRITE UR-MCNC: NEGATIVE — SIGNIFICANT CHANGE UP
PH UR: 6.5 — SIGNIFICANT CHANGE UP (ref 5–8)
PROT UR-MCNC: 15 MG/DL
RBC CASTS # UR COMP ASSIST: SIGNIFICANT CHANGE UP /HPF (ref 0–4)
SP GR SPEC: 1 — LOW (ref 1.01–1.02)
UROBILINOGEN FLD QL: NEGATIVE MG/DL — SIGNIFICANT CHANGE UP
WBC UR QL: ABNORMAL

## 2022-07-24 PROCEDURE — 99232 SBSQ HOSP IP/OBS MODERATE 35: CPT

## 2022-07-24 RX ORDER — METOPROLOL TARTRATE 50 MG
25 TABLET ORAL ONCE
Refills: 0 | Status: COMPLETED | OUTPATIENT
Start: 2022-07-24 | End: 2022-07-24

## 2022-07-24 RX ORDER — AMLODIPINE BESYLATE 2.5 MG/1
5 TABLET ORAL DAILY
Refills: 0 | Status: DISCONTINUED | OUTPATIENT
Start: 2022-07-24 | End: 2022-07-24

## 2022-07-24 RX ORDER — METOPROLOL TARTRATE 50 MG
50 TABLET ORAL DAILY
Refills: 0 | Status: DISCONTINUED | OUTPATIENT
Start: 2022-07-25 | End: 2022-07-29

## 2022-07-24 RX ADMIN — ATORVASTATIN CALCIUM 10 MILLIGRAM(S): 80 TABLET, FILM COATED ORAL at 11:37

## 2022-07-24 RX ADMIN — Medication 1 APPLICATION(S): at 05:08

## 2022-07-24 RX ADMIN — CYCLOBENZAPRINE HYDROCHLORIDE 10 MILLIGRAM(S): 10 TABLET, FILM COATED ORAL at 05:08

## 2022-07-24 RX ADMIN — INSULIN GLARGINE 30 UNIT(S): 100 INJECTION, SOLUTION SUBCUTANEOUS at 21:25

## 2022-07-24 RX ADMIN — HEPARIN SODIUM 5000 UNIT(S): 5000 INJECTION INTRAVENOUS; SUBCUTANEOUS at 05:07

## 2022-07-24 RX ADMIN — HEPARIN SODIUM 5000 UNIT(S): 5000 INJECTION INTRAVENOUS; SUBCUTANEOUS at 21:25

## 2022-07-24 RX ADMIN — LIDOCAINE 15 MILLILITER(S): 4 CREAM TOPICAL at 05:07

## 2022-07-24 RX ADMIN — Medication 6: at 11:34

## 2022-07-24 RX ADMIN — MONTELUKAST 10 MILLIGRAM(S): 4 TABLET, CHEWABLE ORAL at 11:36

## 2022-07-24 RX ADMIN — Medication 325 MILLIGRAM(S): at 05:07

## 2022-07-24 RX ADMIN — Medication 10 UNIT(S): at 11:35

## 2022-07-24 RX ADMIN — HEPARIN SODIUM 5000 UNIT(S): 5000 INJECTION INTRAVENOUS; SUBCUTANEOUS at 13:11

## 2022-07-24 RX ADMIN — LIDOCAINE 15 MILLILITER(S): 4 CREAM TOPICAL at 11:38

## 2022-07-24 RX ADMIN — Medication 1 APPLICATION(S): at 17:07

## 2022-07-24 RX ADMIN — Medication 10 UNIT(S): at 08:03

## 2022-07-24 RX ADMIN — Medication 2: at 08:02

## 2022-07-24 RX ADMIN — CYCLOBENZAPRINE HYDROCHLORIDE 10 MILLIGRAM(S): 10 TABLET, FILM COATED ORAL at 21:25

## 2022-07-24 RX ADMIN — Medication 10 UNIT(S): at 15:54

## 2022-07-24 RX ADMIN — PANTOPRAZOLE SODIUM 40 MILLIGRAM(S): 20 TABLET, DELAYED RELEASE ORAL at 08:03

## 2022-07-24 RX ADMIN — Medication 25 MILLIGRAM(S): at 05:08

## 2022-07-24 RX ADMIN — METFORMIN HYDROCHLORIDE 500 MILLIGRAM(S): 850 TABLET ORAL at 11:36

## 2022-07-24 RX ADMIN — POLYETHYLENE GLYCOL 3350 17 GRAM(S): 17 POWDER, FOR SOLUTION ORAL at 11:36

## 2022-07-24 RX ADMIN — Medication 325 MILLIGRAM(S): at 17:17

## 2022-07-24 RX ADMIN — CYCLOBENZAPRINE HYDROCHLORIDE 10 MILLIGRAM(S): 10 TABLET, FILM COATED ORAL at 13:09

## 2022-07-24 RX ADMIN — SENNA PLUS 2 TABLET(S): 8.6 TABLET ORAL at 21:25

## 2022-07-24 RX ADMIN — Medication 25 MILLIGRAM(S): at 12:12

## 2022-07-24 RX ADMIN — LIDOCAINE 15 MILLILITER(S): 4 CREAM TOPICAL at 17:17

## 2022-07-24 NOTE — PROGRESS NOTE ADULT - SUBJECTIVE AND OBJECTIVE BOX
Patient seen and examined at bedside this AM. Pain well controlled with medication, pt reports doing much better. Patient denies any numbness, tingling, weakness, or any other orthopaedic complaint. Denies N/V/CP/SOB. Ambulating well with walker.    LABS:  Pending collection      VITAL SIGNS:  T(C): 36.4 (07-24-22 @ 05:10), Max: 36.9 (07-23-22 @ 23:17)  HR: 86 (07-24-22 @ 05:10) (86 - 95)  BP: 139/81 (07-24-22 @ 05:10) (138/82 - 149/71)  RR: 18 (07-24-22 @ 05:10) (18 - 18)  SpO2: 97% (07-24-22 @ 05:10) (97% - 100%)    PE  General: awake, alert     Spine:  dressing CDI  +mild dee dee-incisional TTP  + radial pulses  + DP Pulses    LE Motor:                     L2                  L3             L4              L5            S1  R            5/5                5/5             5/5            5/5          5/5  L             5/5                5/5            5/5            5/5          5/5    LE Sensory:               L2          L3         L4      L5       S1         (0=absent, 1=impaired, 2=normal, NT=not testable)  R         2            2            2        2        2  L          2            2           2        2         2        A/P:  66y F s/p L4-5 decompression, TLIF POD 10    -Patient passed TOV, 7/22, no further patton needed  -Medical comanagement and endo recs appreciated  -PT/OT   -WBAT  -Pain Control PRN  -DVT ppx - OK for SubQ heparin 5000U q8 hrs   -Encourage ambulation / SCDs  -FU AM Labs  -Incentive Spirometry  -Urology consulted, recs appreciated, f/u outpatient for further management and monitoring if neurogenic bladder returns  -Continue with Bowel Regimen   -Patient doing much better, ambulating with significantly decreased pain/ discomfort --> ambulating well  -No further acute orthopaedic surgical intervention indicated. Orthopaedically stable for d/c w/ outpatient followup   -Dispo: MAIA; Pending authorization from [employer] for d/c  -Will Discuss with attending Dr. Christopher and advise if any changes to plan

## 2022-07-24 NOTE — PROGRESS NOTE ADULT - SUBJECTIVE AND OBJECTIVE BOX
Patient is a 66y old  Female who presents with a chief complaint of Left leg/thigh pain. (24 Jul 2022 06:44)      INTERVAL HPI/OVERNIGHT EVENTS:  pt with no complaints  eating well   good appetite    MEDICATIONS  (STANDING):  atorvastatin Oral Tab/Cap - Peds 10 milliGRAM(s) Oral daily  cyclobenzaprine 10 milliGRAM(s) Oral every 8 hours  dextrose 5%. 1000 milliLiter(s) (50 mL/Hr) IV Continuous <Continuous>  dextrose 5%. 1000 milliLiter(s) (100 mL/Hr) IV Continuous <Continuous>  dextrose 5%. 1000 milliLiter(s) (50 mL/Hr) IV Continuous <Continuous>  dextrose 50% Injectable 25 Gram(s) IV Push once  dextrose 50% Injectable 25 Gram(s) IV Push once  dextrose 50% Injectable 12.5 Gram(s) IV Push once  dextrose 50% Injectable 25 Gram(s) IV Push once  ferrous    sulfate 325 milliGRAM(s) Oral two times a day  glucagon  Injectable 1 milliGRAM(s) IntraMuscular once  glucagon  Injectable 1 milliGRAM(s) IntraMuscular once  heparin   Injectable 5000 Unit(s) SubCutaneous every 8 hours  insulin glargine Injectable (LANTUS) 30 Unit(s) SubCutaneous at bedtime  insulin lispro (ADMELOG) corrective regimen sliding scale   SubCutaneous three times a day before meals  insulin lispro (ADMELOG) corrective regimen sliding scale   SubCutaneous at bedtime  insulin lispro Injectable (ADMELOG) 10 Unit(s) SubCutaneous three times a day before meals  lidocaine 2% Viscous 15 milliLiter(s) Swish and Spit four times a day  metFORMIN 500 milliGRAM(s) Oral daily  metoprolol succinate ER 25 milliGRAM(s) Oral daily  montelukast 10 milliGRAM(s) Oral daily  naloxone Injectable 0.4 milliGRAM(s) IV Push once  pantoprazole    Tablet 40 milliGRAM(s) Oral before breakfast  polyethylene glycol 3350 17 Gram(s) Oral daily  senna 2 Tablet(s) Oral at bedtime  sodium chloride 0.9%. 1000 milliLiter(s) (75 mL/Hr) IV Continuous <Continuous>  vitamin A &amp; D Ointment 1 Application(s) Topical two times a day    MEDICATIONS  (PRN):  acetaminophen     Tablet .. 650 milliGRAM(s) Oral every 6 hours PRN Temp greater or equal to 38C (100.4F), Mild Pain (1 - 3)  benzocaine 15 mG/menthol 3.6 mG Lozenge 1 Lozenge Oral three times a day PRN Sore Throat  bisacodyl 5 milliGRAM(s) Oral every 12 hours PRN Constipation  dextrose Oral Gel 15 Gram(s) Oral once PRN Blood Glucose LESS THAN 70 milliGRAM(s)/deciliter  dextrose Oral Gel 15 Gram(s) Oral once PRN Blood Glucose LESS THAN 70 milliGRAM(s)/deciliter  magnesium hydroxide Suspension 30 milliLiter(s) Oral every 12 hours PRN Constipation  melatonin 3 milliGRAM(s) Oral at bedtime PRN Insomnia  ondansetron Injectable 4 milliGRAM(s) IV Push every 6 hours PRN Nausea and/or Vomiting      REVIEW OF SYSTEMS:  CONSTITUTIONAL: No fever, weight loss, or fatigue  RESPIRATORY: No cough, wheezing, chills or hemoptysis; No shortness of breath  CARDIOVASCULAR: No chest pain, palpitations, dizziness, or leg swelling  GASTROINTESTINAL: No abdominal or epigastric pain. No nausea, vomiting, or hematemesis; No diarrhea or constipation. No melena or hematochezia.  ENDOCRINE: No heat or cold intolerance; No hair loss      Vital Signs Last 24 Hrs  T(C): 36.4 (24 Jul 2022 05:10), Max: 36.9 (23 Jul 2022 23:17)  T(F): 97.5 (24 Jul 2022 05:10), Max: 98.5 (23 Jul 2022 23:17)  HR: 86 (24 Jul 2022 05:10) (86 - 95)  BP: 139/81 (24 Jul 2022 05:10) (138/82 - 149/71)  BP(mean): --  RR: 18 (24 Jul 2022 05:10) (18 - 18)  SpO2: 97% (24 Jul 2022 05:10) (97% - 100%)        PHYSICAL EXAM:  GENERAL: NAD, well-groomed, well-developed  CHEST/LUNG: Clear to percussion bilaterally; No rales, rhonchi, wheezing, or rubs  HEART: Regular rate and rhythm; No murmurs, rubs, or gallops  ABDOMEN: Soft, Nontender, Nondistended; Bowel sounds present        LABS:              CAPILLARY BLOOD GLUCOSE      POCT Blood Glucose.: 182 mg/dL (24 Jul 2022 07:54)  POCT Blood Glucose.: 287 mg/dL (23 Jul 2022 21:33)  POCT Blood Glucose.: 94 mg/dL (23 Jul 2022 15:40)  POCT Blood Glucose.: 247 mg/dL (23 Jul 2022 10:54)    Lipid panel:               RADIOLOGY & ADDITIONAL TESTS:

## 2022-07-24 NOTE — PROGRESS NOTE ADULT - ASSESSMENT
66F Kinyarwanda speaking PMHx of HTN, HLD, DM 2  presenting with left hip pain x 1-2 days. Described as mild, achy, shooting pain down left buttocks to knee. Exacerbated w/ standing and position. Pain level is 8/10 in intensity, not getting better after multiple doses of pain meds in ER. Otherwise, the patient denies any neck pain, headaches, chest pain, shortness of breath, n/v/d, abdominal pain, recent illness, fevers, chills, recent spinal/back surgeries or procedures, bowel or bladder incontinence, bowel or bladder retention, constipation, IV drug use, known cancer history, recent weight loss, trauma, weakness, other subjective neurological deficits, numbness/tingling, dysuria, hematuria, rashes. Ct Abd/Pelvis and CT Lumbar Spine :Mild motion artifact in the abdomen. No evidence of acute abdominal or pelvic abnormality. No evidence of acute lumbar spine abnormality. Mild degenerative changes of the lower lumbar spine. US negative for DVT.     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   -CT Lumbar Spine: L3-4: Disc bulge slightly asymmetric to the left. Mild bilateral facet   arthropathy. Mild spinal canal stenosis and minimal left neural foraminal   narrowing.  L4-5: Moderate disc bulge, mild bilateral facet arthropathy and   thickening of ligamenta flava. Moderate to severe spinal canal stenosis.   Mild to moderate left greater than right neural foraminal narrowing.  L5-S1: Mild disc bulge. Moderate bilateral facet arthropathy.   Mild/moderate spinal canal stenosis. Mass effect on the traversing   bilateral S1 nerve roots. Mild bilateral neural foraminal narrowing.  Orthopedic on board   7/14 L4-5 decompression, TLIF ( transforaminal lumbar interbody fusion)  -cont w/ analgesics prn   -cont w/  lumosacral orthosis brace; [facility] on discharge   7/23/2022 appreciate ortho note    Acute Toxic Metabolic Encephalopathy   per my colleague's documentation was presumed secondary to opioid  Head CT: no acute changes    cont to monitor mentation with any  opioid use     DM II   HgA1c: 8.4% uncontrolled   cont w/  lantus and prandial insulin   cont w/ FS monitoring w/ insulin s/s coverage for now   7/20/2022 FS still elevated will d/w endocrine    CHRIS-on Stage 2 chronic kidney disease  avoid nephrotoxic medications, renal dose meds   cont to monitor renal function as needed    7/20/2022 at baseline  7/21/2022 voiding per nurse patton was removed     Hypertension   cont w/ Metoprolol.  7/23/2022 consider increase metoprolol  7/24/2022 will increase  toprol xl    Dyslipidemia.   cont w/ Atorvastatin.    DVTp: heparin   Disposition:  pending [facility] acceptance     Rob Winter [phone]   66F Sami speaking PMHx of HTN, HLD, DM 2  presenting with left hip pain x 1-2 days. Described as mild, achy, shooting pain down left buttocks to knee. Exacerbated w/ standing and position. Pain level is 8/10 in intensity, not getting better after multiple doses of pain meds in ER. Otherwise, the patient denies any neck pain, headaches, chest pain, shortness of breath, n/v/d, abdominal pain, recent illness, fevers, chills, recent spinal/back surgeries or procedures, bowel or bladder incontinence, bowel or bladder retention, constipation, IV drug use, known cancer history, recent weight loss, trauma, weakness, other subjective neurological deficits, numbness/tingling, dysuria, hematuria, rashes. Ct Abd/Pelvis and CT Lumbar Spine :Mild motion artifact in the abdomen. No evidence of acute abdominal or pelvic abnormality. No evidence of acute lumbar spine abnormality. Mild degenerative changes of the lower lumbar spine. US negative for DVT.     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   -CT Lumbar Spine: L3-4: Disc bulge slightly asymmetric to the left. Mild bilateral facet   arthropathy. Mild spinal canal stenosis and minimal left neural foraminal   narrowing.  L4-5: Moderate disc bulge, mild bilateral facet arthropathy and   thickening of ligamenta flava. Moderate to severe spinal canal stenosis.   Mild to moderate left greater than right neural foraminal narrowing.  L5-S1: Mild disc bulge. Moderate bilateral facet arthropathy.   Mild/moderate spinal canal stenosis. Mass effect on the traversing   bilateral S1 nerve roots. Mild bilateral neural foraminal narrowing.  Orthopedic on board   7/14 L4-5 decompression, TLIF ( transforaminal lumbar interbody fusion)  -cont w/ analgesics prn   -cont w/  lumosacral orthosis brace; [facility] on discharge   7/23/2022 appreciate ortho note    Acute Toxic Metabolic Encephalopathy   per my colleague's documentation was presumed secondary to opioid  Head CT: no acute changes    cont to monitor mentation with any  opioid use     DM II   HgA1c: 8.4% uncontrolled   cont w/  lantus and prandial insulin   cont w/ FS monitoring w/ insulin s/s coverage for now   7/20/2022 FS still elevated will d/w endocrine    CHRIS-on Stage 2 chronic kidney disease  avoid nephrotoxic medications, renal dose meds   cont to monitor renal function as needed    7/20/2022 at baseline  7/21/2022 voiding per nurse patton was removed     Hypertension   cont w/ Metoprolol.  7/23/2022 consider increase metoprolol  7/24/2022 will increase  toprol xl    Dyslipidemia.   cont w/ Atorvastatin.     c/o urinary burning chcek ua for uti    DVTp: heparin   Disposition:  pending [facility] acceptance     Rob Winter [phone]

## 2022-07-24 NOTE — PROGRESS NOTE ADULT - SUBJECTIVE AND OBJECTIVE BOX
Patient is a 66y old  Female who presents with a chief complaint of Left leg/thigh pain. (19 Jul 2022 05:54)    [place]      INTERVAL HPI/ OVERNIGHT EVENTS: Pt was seen and examined at bedside today,    MEDICATIONS  (STANDING):  amLODIPine   Tablet 5 milliGRAM(s) Oral daily  atorvastatin Oral Tab/Cap - Peds 10 milliGRAM(s) Oral daily  cyclobenzaprine 10 milliGRAM(s) Oral every 8 hours  dextrose 5%. 1000 milliLiter(s) (50 mL/Hr) IV Continuous <Continuous>  dextrose 5%. 1000 milliLiter(s) (100 mL/Hr) IV Continuous <Continuous>  dextrose 5%. 1000 milliLiter(s) (50 mL/Hr) IV Continuous <Continuous>  dextrose 50% Injectable 25 Gram(s) IV Push once  dextrose 50% Injectable 25 Gram(s) IV Push once  dextrose 50% Injectable 12.5 Gram(s) IV Push once  dextrose 50% Injectable 25 Gram(s) IV Push once  ferrous    sulfate 325 milliGRAM(s) Oral two times a day  glucagon  Injectable 1 milliGRAM(s) IntraMuscular once  glucagon  Injectable 1 milliGRAM(s) IntraMuscular once  heparin   Injectable 5000 Unit(s) SubCutaneous every 8 hours  insulin glargine Injectable (LANTUS) 30 Unit(s) SubCutaneous at bedtime  insulin lispro (ADMELOG) corrective regimen sliding scale   SubCutaneous three times a day before meals  insulin lispro (ADMELOG) corrective regimen sliding scale   SubCutaneous at bedtime  insulin lispro Injectable (ADMELOG) 10 Unit(s) SubCutaneous three times a day before meals  lidocaine 2% Viscous 15 milliLiter(s) Swish and Spit four times a day  metFORMIN 500 milliGRAM(s) Oral daily  metoprolol succinate ER 25 milliGRAM(s) Oral daily  montelukast 10 milliGRAM(s) Oral daily  naloxone Injectable 0.4 milliGRAM(s) IV Push once  pantoprazole    Tablet 40 milliGRAM(s) Oral before breakfast  polyethylene glycol 3350 17 Gram(s) Oral daily  senna 2 Tablet(s) Oral at bedtime  sodium chloride 0.9%. 1000 milliLiter(s) (75 mL/Hr) IV Continuous <Continuous>  vitamin A &amp; D Ointment 1 Application(s) Topical two times a day    MEDICATIONS  (PRN):  acetaminophen     Tablet .. 650 milliGRAM(s) Oral every 6 hours PRN Temp greater or equal to 38C (100.4F), Mild Pain (1 - 3)  benzocaine 15 mG/menthol 3.6 mG Lozenge 1 Lozenge Oral three times a day PRN Sore Throat  bisacodyl 5 milliGRAM(s) Oral every 12 hours PRN Constipation  dextrose Oral Gel 15 Gram(s) Oral once PRN Blood Glucose LESS THAN 70 milliGRAM(s)/deciliter  dextrose Oral Gel 15 Gram(s) Oral once PRN Blood Glucose LESS THAN 70 milliGRAM(s)/deciliter  magnesium hydroxide Suspension 30 milliLiter(s) Oral every 12 hours PRN Constipation  melatonin 3 milliGRAM(s) Oral at bedtime PRN Insomnia  ondansetron Injectable 4 milliGRAM(s) IV Push every 6 hours PRN Nausea and/or Vomiting      Allergies    No Known Allergies    Intolerances      Vital Signs Last 24 Hrs  T(C): 36.7 (24 Jul 2022 11:31), Max: 36.9 (23 Jul 2022 23:17)  T(F): 98.1 (24 Jul 2022 11:31), Max: 98.5 (23 Jul 2022 23:17)  HR: 95 (24 Jul 2022 11:31) (86 - 95)  BP: 169/70 (24 Jul 2022 11:31) (138/82 - 169/70)  BP(mean): --  RR: 18 (24 Jul 2022 11:31) (18 - 18)  SpO2: 99% (24 Jul 2022 11:31) (97% - 100%)      PHYSICAL EXAM:  GENERAL: NAD on RA,  well-developed, well-groomed  HEAD:  Atraumatic, Normocephalic  EYES: conjunctiva and sclera clear  ENMT: Moist mucous membranes  NECK: Supple,   CHEST/LUNG: Clear to Auscultation bilaterally; No rales, rhonchi, wheezing, or rubs  HEART: Regular rate and rhythm; No murmurs, rubs, or gallops  ABDOMEN: Soft, Nontender, Nondistended; Bowel sounds present  EXTREMITIES: 2+ Peripheral Pulses, No clubbing, cyanosis, or edema  SKIN: Lumbar surgical wound   NERVOUS SYSTEM:  Alert & Oriented X3, Good concentration; Motor Strength 4/5 B/L upper extremities, Lower extremity strength unable to be assess due to severe pain     LABS:                                    Culture - Urine (collected 07-12-22)  Source: Clean Catch Clean Catch (Midstream)  Final Report (07-14-22):    <10,000 CFU/mL Normal Urogenital [place]        RADIOLOGY & ADDITIONAL TESTS:          Imaging Personally Reviewed:  [ ] YES  [ ] NO    Consultant(s) Notes Reviewed:  [ ] YES  [ ] NO    Care Discussed with Consultants/Other Providers [x ] YES  [ ] NO Patient is a 66y old  Female who presents with a chief complaint of Left leg/thigh pain. (19 Jul 2022 05:54)    [place]  [id number]    INTERVAL HPI/ OVERNIGHT EVENTS: Pt was seen and examined at bedside today,    MEDICATIONS  (STANDING):  amLODIPine   Tablet 5 milliGRAM(s) Oral daily  atorvastatin Oral Tab/Cap - Peds 10 milliGRAM(s) Oral daily  cyclobenzaprine 10 milliGRAM(s) Oral every 8 hours  dextrose 5%. 1000 milliLiter(s) (50 mL/Hr) IV Continuous <Continuous>  dextrose 5%. 1000 milliLiter(s) (100 mL/Hr) IV Continuous <Continuous>  dextrose 5%. 1000 milliLiter(s) (50 mL/Hr) IV Continuous <Continuous>  dextrose 50% Injectable 25 Gram(s) IV Push once  dextrose 50% Injectable 25 Gram(s) IV Push once  dextrose 50% Injectable 12.5 Gram(s) IV Push once  dextrose 50% Injectable 25 Gram(s) IV Push once  ferrous    sulfate 325 milliGRAM(s) Oral two times a day  glucagon  Injectable 1 milliGRAM(s) IntraMuscular once  glucagon  Injectable 1 milliGRAM(s) IntraMuscular once  heparin   Injectable 5000 Unit(s) SubCutaneous every 8 hours  insulin glargine Injectable (LANTUS) 30 Unit(s) SubCutaneous at bedtime  insulin lispro (ADMELOG) corrective regimen sliding scale   SubCutaneous three times a day before meals  insulin lispro (ADMELOG) corrective regimen sliding scale   SubCutaneous at bedtime  insulin lispro Injectable (ADMELOG) 10 Unit(s) SubCutaneous three times a day before meals  lidocaine 2% Viscous 15 milliLiter(s) Swish and Spit four times a day  metFORMIN 500 milliGRAM(s) Oral daily  metoprolol succinate ER 25 milliGRAM(s) Oral daily  montelukast 10 milliGRAM(s) Oral daily  naloxone Injectable 0.4 milliGRAM(s) IV Push once  pantoprazole    Tablet 40 milliGRAM(s) Oral before breakfast  polyethylene glycol 3350 17 Gram(s) Oral daily  senna 2 Tablet(s) Oral at bedtime  sodium chloride 0.9%. 1000 milliLiter(s) (75 mL/Hr) IV Continuous <Continuous>  vitamin A &amp; D Ointment 1 Application(s) Topical two times a day    MEDICATIONS  (PRN):  acetaminophen     Tablet .. 650 milliGRAM(s) Oral every 6 hours PRN Temp greater or equal to 38C (100.4F), Mild Pain (1 - 3)  benzocaine 15 mG/menthol 3.6 mG Lozenge 1 Lozenge Oral three times a day PRN Sore Throat  bisacodyl 5 milliGRAM(s) Oral every 12 hours PRN Constipation  dextrose Oral Gel 15 Gram(s) Oral once PRN Blood Glucose LESS THAN 70 milliGRAM(s)/deciliter  dextrose Oral Gel 15 Gram(s) Oral once PRN Blood Glucose LESS THAN 70 milliGRAM(s)/deciliter  magnesium hydroxide Suspension 30 milliLiter(s) Oral every 12 hours PRN Constipation  melatonin 3 milliGRAM(s) Oral at bedtime PRN Insomnia  ondansetron Injectable 4 milliGRAM(s) IV Push every 6 hours PRN Nausea and/or Vomiting      Allergies    No Known Allergies    Intolerances      Vital Signs Last 24 Hrs  T(C): 36.7 (24 Jul 2022 11:31), Max: 36.9 (23 Jul 2022 23:17)  T(F): 98.1 (24 Jul 2022 11:31), Max: 98.5 (23 Jul 2022 23:17)  HR: 95 (24 Jul 2022 11:31) (86 - 95)  BP: 169/70 (24 Jul 2022 11:31) (138/82 - 169/70)  BP(mean): --  RR: 18 (24 Jul 2022 11:31) (18 - 18)  SpO2: 99% (24 Jul 2022 11:31) (97% - 100%)      PHYSICAL EXAM:  GENERAL: NAD on RA,  well-developed, well-groomed  HEAD:  Atraumatic, Normocephalic  EYES: conjunctiva and sclera clear  ENMT: Moist mucous membranes  NECK: Supple,   CHEST/LUNG: Clear to Auscultation bilaterally; No rales, rhonchi, wheezing, or rubs  HEART: Regular rate and rhythm; No murmurs, rubs, or gallops  ABDOMEN: Soft, Nontender, Nondistended; Bowel sounds present  EXTREMITIES: 2+ Peripheral Pulses, No clubbing, cyanosis, or edema  SKIN: Lumbar surgical wound   NERVOUS SYSTEM:  Alert & Oriented X3, Good concentration; Motor Strength 4/5 B/L upper extremities, Lower extremity strength unable to be assess due to severe pain     LABS:                                    Culture - Urine (collected 07-12-22)  Source: Clean Catch Clean Catch (Midstream)  Final Report (07-14-22):    <10,000 CFU/mL Normal Urogenital [place]        RADIOLOGY & ADDITIONAL TESTS:          Imaging Personally Reviewed:  [ ] YES  [ ] NO    Consultant(s) Notes Reviewed:  [ ] YES  [ ] NO    Care Discussed with Consultants/Other Providers [x ] YES  [ ] NO

## 2022-07-25 LAB
ANION GAP SERPL CALC-SCNC: 10 MMOL/L — SIGNIFICANT CHANGE UP (ref 5–17)
BUN SERPL-MCNC: 9 MG/DL — SIGNIFICANT CHANGE UP (ref 7–23)
CALCIUM SERPL-MCNC: 9.3 MG/DL — SIGNIFICANT CHANGE UP (ref 8.5–10.1)
CHLORIDE SERPL-SCNC: 108 MMOL/L — SIGNIFICANT CHANGE UP (ref 96–108)
CO2 SERPL-SCNC: 22 MMOL/L — SIGNIFICANT CHANGE UP (ref 22–31)
CREAT SERPL-MCNC: 1.02 MG/DL — SIGNIFICANT CHANGE UP (ref 0.5–1.3)
EGFR: 61 ML/MIN/1.73M2 — SIGNIFICANT CHANGE UP
GLUCOSE BLDC GLUCOMTR-MCNC: 187 MG/DL — HIGH (ref 70–99)
GLUCOSE BLDC GLUCOMTR-MCNC: 193 MG/DL — HIGH (ref 70–99)
GLUCOSE BLDC GLUCOMTR-MCNC: 199 MG/DL — HIGH (ref 70–99)
GLUCOSE BLDC GLUCOMTR-MCNC: 201 MG/DL — HIGH (ref 70–99)
GLUCOSE SERPL-MCNC: 180 MG/DL — HIGH (ref 70–99)
HCT VFR BLD CALC: 29.5 % — LOW (ref 34.5–45)
HGB BLD-MCNC: 10 G/DL — LOW (ref 11.5–15.5)
MAGNESIUM SERPL-MCNC: 1.8 MG/DL — SIGNIFICANT CHANGE UP (ref 1.6–2.6)
MCHC RBC-ENTMCNC: 29.8 PG — SIGNIFICANT CHANGE UP (ref 27–34)
MCHC RBC-ENTMCNC: 33.9 G/DL — SIGNIFICANT CHANGE UP (ref 32–36)
MCV RBC AUTO: 87.8 FL — SIGNIFICANT CHANGE UP (ref 80–100)
NRBC # BLD: 0 /100 WBCS — SIGNIFICANT CHANGE UP (ref 0–0)
PHOSPHATE SERPL-MCNC: 4.8 MG/DL — HIGH (ref 2.5–4.5)
PLATELET # BLD AUTO: 493 K/UL — HIGH (ref 150–400)
POTASSIUM SERPL-MCNC: 3.7 MMOL/L — SIGNIFICANT CHANGE UP (ref 3.5–5.3)
POTASSIUM SERPL-SCNC: 3.7 MMOL/L — SIGNIFICANT CHANGE UP (ref 3.5–5.3)
RBC # BLD: 3.36 M/UL — LOW (ref 3.8–5.2)
RBC # FLD: 13.6 % — SIGNIFICANT CHANGE UP (ref 10.3–14.5)
SODIUM SERPL-SCNC: 140 MMOL/L — SIGNIFICANT CHANGE UP (ref 135–145)
WBC # BLD: 12.01 K/UL — HIGH (ref 3.8–10.5)
WBC # FLD AUTO: 12.01 K/UL — HIGH (ref 3.8–10.5)

## 2022-07-25 PROCEDURE — 99232 SBSQ HOSP IP/OBS MODERATE 35: CPT

## 2022-07-25 RX ORDER — CEFTRIAXONE 500 MG/1
1000 INJECTION, POWDER, FOR SOLUTION INTRAMUSCULAR; INTRAVENOUS EVERY 24 HOURS
Refills: 0 | Status: COMPLETED | OUTPATIENT
Start: 2022-07-26 | End: 2022-07-28

## 2022-07-25 RX ORDER — CEFTRIAXONE 500 MG/1
INJECTION, POWDER, FOR SOLUTION INTRAMUSCULAR; INTRAVENOUS
Refills: 0 | Status: COMPLETED | OUTPATIENT
Start: 2022-07-25 | End: 2022-07-29

## 2022-07-25 RX ORDER — CEFTRIAXONE 500 MG/1
1000 INJECTION, POWDER, FOR SOLUTION INTRAMUSCULAR; INTRAVENOUS ONCE
Refills: 0 | Status: COMPLETED | OUTPATIENT
Start: 2022-07-25 | End: 2022-07-25

## 2022-07-25 RX ADMIN — ATORVASTATIN CALCIUM 10 MILLIGRAM(S): 80 TABLET, FILM COATED ORAL at 12:17

## 2022-07-25 RX ADMIN — Medication 2: at 08:43

## 2022-07-25 RX ADMIN — CYCLOBENZAPRINE HYDROCHLORIDE 10 MILLIGRAM(S): 10 TABLET, FILM COATED ORAL at 14:26

## 2022-07-25 RX ADMIN — Medication 10 UNIT(S): at 08:44

## 2022-07-25 RX ADMIN — HEPARIN SODIUM 5000 UNIT(S): 5000 INJECTION INTRAVENOUS; SUBCUTANEOUS at 21:30

## 2022-07-25 RX ADMIN — HEPARIN SODIUM 5000 UNIT(S): 5000 INJECTION INTRAVENOUS; SUBCUTANEOUS at 05:01

## 2022-07-25 RX ADMIN — Medication 2: at 16:50

## 2022-07-25 RX ADMIN — SODIUM CHLORIDE 75 MILLILITER(S): 9 INJECTION INTRAMUSCULAR; INTRAVENOUS; SUBCUTANEOUS at 21:35

## 2022-07-25 RX ADMIN — POLYETHYLENE GLYCOL 3350 17 GRAM(S): 17 POWDER, FOR SOLUTION ORAL at 12:26

## 2022-07-25 RX ADMIN — METFORMIN HYDROCHLORIDE 500 MILLIGRAM(S): 850 TABLET ORAL at 12:15

## 2022-07-25 RX ADMIN — Medication 325 MILLIGRAM(S): at 05:01

## 2022-07-25 RX ADMIN — Medication 50 MILLIGRAM(S): at 05:01

## 2022-07-25 RX ADMIN — SENNA PLUS 2 TABLET(S): 8.6 TABLET ORAL at 21:30

## 2022-07-25 RX ADMIN — LIDOCAINE 15 MILLILITER(S): 4 CREAM TOPICAL at 12:17

## 2022-07-25 RX ADMIN — INSULIN GLARGINE 30 UNIT(S): 100 INJECTION, SOLUTION SUBCUTANEOUS at 21:30

## 2022-07-25 RX ADMIN — MONTELUKAST 10 MILLIGRAM(S): 4 TABLET, CHEWABLE ORAL at 12:27

## 2022-07-25 RX ADMIN — CYCLOBENZAPRINE HYDROCHLORIDE 10 MILLIGRAM(S): 10 TABLET, FILM COATED ORAL at 05:01

## 2022-07-25 RX ADMIN — HEPARIN SODIUM 5000 UNIT(S): 5000 INJECTION INTRAVENOUS; SUBCUTANEOUS at 14:26

## 2022-07-25 RX ADMIN — CYCLOBENZAPRINE HYDROCHLORIDE 10 MILLIGRAM(S): 10 TABLET, FILM COATED ORAL at 21:29

## 2022-07-25 RX ADMIN — Medication 1 APPLICATION(S): at 05:02

## 2022-07-25 RX ADMIN — Medication 10 UNIT(S): at 16:51

## 2022-07-25 RX ADMIN — CEFTRIAXONE 100 MILLIGRAM(S): 500 INJECTION, POWDER, FOR SOLUTION INTRAMUSCULAR; INTRAVENOUS at 11:00

## 2022-07-25 RX ADMIN — Medication 10 UNIT(S): at 12:13

## 2022-07-25 RX ADMIN — SODIUM CHLORIDE 75 MILLILITER(S): 9 INJECTION INTRAMUSCULAR; INTRAVENOUS; SUBCUTANEOUS at 05:00

## 2022-07-25 RX ADMIN — LIDOCAINE 15 MILLILITER(S): 4 CREAM TOPICAL at 05:00

## 2022-07-25 RX ADMIN — LIDOCAINE 15 MILLILITER(S): 4 CREAM TOPICAL at 18:49

## 2022-07-25 RX ADMIN — Medication 1 APPLICATION(S): at 18:49

## 2022-07-25 RX ADMIN — Medication 325 MILLIGRAM(S): at 18:47

## 2022-07-25 RX ADMIN — Medication 2: at 12:12

## 2022-07-25 RX ADMIN — PANTOPRAZOLE SODIUM 40 MILLIGRAM(S): 20 TABLET, DELAYED RELEASE ORAL at 08:43

## 2022-07-25 NOTE — CHART NOTE - NSCHARTNOTEFT_GEN_A_CORE
IF patient not leaving for [facility] in AM, can repeat TOV, w/ repeat bladder scan 8 hours after; If Fails, can replace patton for outpatient follow up with Urology for monitoring of bladder function.
66F Danish speaking PMHx of dm-2, htn hld presenting with left hip pain x 1-2 days. Described as mild, achy, shooting pain down left buttocks to knee. Exacerbated w/ standing and position. Pain level is 8/10 in intensity, not getting better after mutiple doses of pain meds in ER. Please see H&P for more details. Pt was seen and examined at the bedside. Will continue with plan outlined in H&P.
Per chart pt with PMH T2DM, HTN, HLD, presents with hip pain, found with lumbar radiculopathy. s/p lumbar decompression 07/14. Course complicated by acute toxic metabolic encephalopathy possibly due to opioid meds. Pending [facility] acceptance.     Factors impacting intake: [ ] none [ ] nausea  [ ] vomiting [ ] diarrhea [ ] constipation  [ ]chewing problems [ ] swallowing issues  [x] other: variable appetite    Diet Prescription: Diet, Consistent Carbohydrate/No Snacks (07-16-22 @ 23:18)    Intake: Per flow sheets pt consuming 26-75% of documented meals; PCA reports pt consumed ~50% of breakfast this morning and is tolerating it well.     Current Weight: no recent weights to trend  % Weight Change: n/a    No noted edema as per flow sheets.     Unable to conduct nutrition focused physical exam as pt asleep at time of visit    Pertinent Medications: MEDICATIONS  (STANDING):  atorvastatin Oral Tab/Cap - Peds 10 milliGRAM(s) Oral daily  cefTRIAXone   IVPB      cefTRIAXone   IVPB 1000 milliGRAM(s) IV Intermittent once  cyclobenzaprine 10 milliGRAM(s) Oral every 8 hours  dextrose 5%. 1000 milliLiter(s) (50 mL/Hr) IV Continuous <Continuous>  dextrose 5%. 1000 milliLiter(s) (100 mL/Hr) IV Continuous <Continuous>  dextrose 5%. 1000 milliLiter(s) (50 mL/Hr) IV Continuous <Continuous>  dextrose 50% Injectable 25 Gram(s) IV Push once  dextrose 50% Injectable 25 Gram(s) IV Push once  dextrose 50% Injectable 12.5 Gram(s) IV Push once  dextrose 50% Injectable 25 Gram(s) IV Push once  ferrous    sulfate 325 milliGRAM(s) Oral two times a day  glucagon  Injectable 1 milliGRAM(s) IntraMuscular once  glucagon  Injectable 1 milliGRAM(s) IntraMuscular once  heparin   Injectable 5000 Unit(s) SubCutaneous every 8 hours  insulin glargine Injectable (LANTUS) 30 Unit(s) SubCutaneous at bedtime  insulin lispro (ADMELOG) corrective regimen sliding scale   SubCutaneous three times a day before meals  insulin lispro (ADMELOG) corrective regimen sliding scale   SubCutaneous at bedtime  insulin lispro Injectable (ADMELOG) 10 Unit(s) SubCutaneous three times a day before meals  lidocaine 2% Viscous 15 milliLiter(s) Swish and Spit four times a day  metFORMIN 500 milliGRAM(s) Oral daily  metoprolol succinate ER 50 milliGRAM(s) Oral daily  montelukast 10 milliGRAM(s) Oral daily  naloxone Injectable 0.4 milliGRAM(s) IV Push once  pantoprazole    Tablet 40 milliGRAM(s) Oral before breakfast  polyethylene glycol 3350 17 Gram(s) Oral daily  senna 2 Tablet(s) Oral at bedtime  sodium chloride 0.9%. 1000 milliLiter(s) (75 mL/Hr) IV Continuous <Continuous>  vitamin A &amp; D Ointment 1 Application(s) Topical two times a day    MEDICATIONS  (PRN):  acetaminophen     Tablet .. 650 milliGRAM(s) Oral every 6 hours PRN Temp greater or equal to 38C (100.4F), Mild Pain (1 - 3)  benzocaine 15 mG/menthol 3.6 mG Lozenge 1 Lozenge Oral three times a day PRN Sore Throat  bisacodyl 5 milliGRAM(s) Oral every 12 hours PRN Constipation  dextrose Oral Gel 15 Gram(s) Oral once PRN Blood Glucose LESS THAN 70 milliGRAM(s)/deciliter  dextrose Oral Gel 15 Gram(s) Oral once PRN Blood Glucose LESS THAN 70 milliGRAM(s)/deciliter  magnesium hydroxide Suspension 30 milliLiter(s) Oral every 12 hours PRN Constipation  melatonin 3 milliGRAM(s) Oral at bedtime PRN Insomnia  ondansetron Injectable 4 milliGRAM(s) IV Push every 6 hours PRN Nausea and/or Vomiting    Pertinent Labs: 07-25 Na140 mmol/L Glu 180 mg/dL<H> K+ 3.7 mmol/L Cr  1.02 mg/dL BUN 9 mg/dL 07-25 Phos 4.8 mg/dL<H>    07-14-22 @ 05:16  A1C 8.4    Skin: no pressure injuries per flow sheets    Estimated Needs:   [x] no change since previous assessment: 07/16  [ ] recalculated:     Previous Nutrition Diagnosis:   [x] Other	Excessive CHO intake    Etiology	Physiological cause (DM) requiring modified CHO intake    Signs/Symptoms	Elevated HgbA1c (8.4%), elevated BG levels (381, 477, 514 mg/dL)    Goal/Expected Outcome	Improved glycemic control (140-180 m/dL) (not consistently met)      Nutrition Diagnosis is [x] ongoing  [ ] resolved [ ] not applicable     New Nutrition Diagnosis: [x] not applicable      Interventions:   Recommend  [x] Continue current diet as ordered  [ ] Change Diet To:  [x] Nutrition Supplement: Add Glucerna x 1/day (provides 220 kcal, 10 g protein) to optimize PO intake  [ ] Nutrition Support  [x] Other: Continue to provide assistance and encouragement with PO intake     Monitoring and Evaluation:   [x] PO intake [ x ] Tolerance to diet prescription [ x ] weights [ x ] labs[ x ] follow up per protocol  [ ] other:

## 2022-07-25 NOTE — PROGRESS NOTE ADULT - SUBJECTIVE AND OBJECTIVE BOX
Patient seen and examined at bedside this AM. Pain well controlled with medication, pt reports doing much better. Patient denies any numbness, tingling, weakness, or any other orthopaedic complaint. Denies N/V/CP/SOB. Ambulating well with walker.    LABS:    NNL        Urinalysis Basic - ( 2022 15:38 )    Color: Yellow / Appearance: Clear / S.005 / pH: x  Gluc: x / Ketone: Negative  / Bili: Negative / Urobili: Negative mg/dL   Blood: x / Protein: 15 mg/dL / Nitrite: Negative   Leuk Esterase: Trace / RBC: 0-2 /HPF / WBC 6-10   Sq Epi: x / Non Sq Epi: Occasional / Bacteria: Occasional        VITAL SIGNS:  T(C): 36.5 (22 @ 05:25), Max: 36.8 (22 @ 16:57)  HR: 81 (22 @ 05:25) (81 - 95)  BP: 131/72 (22 @ 05:25) (131/72 - 169/70)  RR: 17 (22 @ 05:25) (17 - 18)  SpO2: 98% (22 @ 05:25) (97% - 99%)    PE  General: awake, alert     Spine:  dressing CDI  +mild dee dee-incisional TTP  + radial pulses  + DP Pulses    LE Motor:                     L2                  L3             L4              L5            S1  R            5/5                5/5             5/5            5/5          5/5  L             5/5                5/5            5/5            5/5          5/5    LE Sensory:               L2          L3         L4      L5       S1         (0=absent, 1=impaired, 2=normal, NT=not testable)  R         2            2            2        2        2  L          2            2           2        2         2        A/P:  66y F s/p L4-5 decompression, TLIF POD 11    -Patient passed TOV, , no further patton needed  -Medical comanagement and endo recs appreciated  -PT/OT   -WBAT  -Pain Control PRN  -DVT ppx - OK for SubQ heparin 5000U q8 hrs   -Encourage ambulation / SCDs  -FU AM Labs  -Incentive Spirometry  -Urology consulted, recs appreciated, f/u outpatient for further management and monitoring if neurogenic bladder returns  -Continue with Bowel Regimen   -Patient doing much better, ambulating with significantly decreased pain/ discomfort --> ambulating well  -No further acute orthopaedic surgical intervention indicated. Orthopaedically stable for d/c w/ outpatient followup   -Dispo: [facility]; Pending authorization from insurance co for d/c  -Will Discuss with attending Dr. Christopher and advise if any changes to plan

## 2022-07-25 NOTE — PROGRESS NOTE ADULT - ASSESSMENT
66F Persian speaking PMHx of HTN, HLD, DM 2  presenting with left hip pain x 1-2 days. Described as mild, achy, shooting pain down left buttocks to knee. Exacerbated w/ standing and position. Pain level is 8/10 in intensity, not getting better after multiple doses of pain meds in ER. Otherwise, the patient denies any neck pain, headaches, chest pain, shortness of breath, n/v/d, abdominal pain, recent illness, fevers, chills, recent spinal/back surgeries or procedures, bowel or bladder incontinence, bowel or bladder retention, constipation, IV drug use, known cancer history, recent weight loss, trauma, weakness, other subjective neurological deficits, numbness/tingling, dysuria, hematuria, rashes. Ct Abd/Pelvis and CT Lumbar Spine :Mild motion artifact in the abdomen. No evidence of acute abdominal or pelvic abnormality. No evidence of acute lumbar spine abnormality. Mild degenerative changes of the lower lumbar spine. US negative for DVT.     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   -CT Lumbar Spine: L3-4: Disc bulge slightly asymmetric to the left. Mild bilateral facet   arthropathy. Mild spinal canal stenosis and minimal left neural foraminal   narrowing.  L4-5: Moderate disc bulge, mild bilateral facet arthropathy and   thickening of ligamenta flava. Moderate to severe spinal canal stenosis.   Mild to moderate left greater than right neural foraminal narrowing.  L5-S1: Mild disc bulge. Moderate bilateral facet arthropathy.   Mild/moderate spinal canal stenosis. Mass effect on the traversing   bilateral S1 nerve roots. Mild bilateral neural foraminal narrowing.  Orthopedic on board   7/14 L4-5 decompression, TLIF ( transforaminal lumbar interbody fusion)  -cont w/ analgesics prn   -cont w/  lumosacral orthosis brace; MAIA on discharge   7/23/2022 appreciate ortho note    Acute Toxic Metabolic Encephalopathy   per my colleague's documentation was presumed secondary to opioid  Head CT: no acute changes    cont to monitor mentation with any  opioid use     DM II   HgA1c: 8.4% uncontrolled   cont w/  lantus and prandial insulin   cont w/ FS monitoring w/ insulin s/s coverage for now   7/20/2022 FS still elevated will d/w endocrine    CHRIS-on Stage 2 chronic kidney disease  avoid nephrotoxic medications, renal dose meds   cont to monitor renal function as needed    7/20/2022 at baseline  7/21/2022 voiding per nurse patton was removed     Hypertension   cont w/ Metoprolol.  7/23/2022 consider increase metoprolol  7/24/2022 will increase  toprol xl    Dyslipidemia.   cont w/ Atorvastatin.   urinary burning / frequency ua c/w with uti will start antibx f/u urine cx    DVTp: heparin   Disposition:  pending MAIA acceptance     Son Moy [phone]

## 2022-07-25 NOTE — PROGRESS NOTE ADULT - SUBJECTIVE AND OBJECTIVE BOX
Patient is a 66y old  Female who presents with a chief complaint of Left leg/thigh pain. (19 Jul 2022 05:54)    [place]      INTERVAL HPI/ OVERNIGHT EVENTS: Pt was seen and examined at bedside today,    MEDICATIONS  (STANDING):  atorvastatin Oral Tab/Cap - Peds 10 milliGRAM(s) Oral daily  cefTRIAXone   IVPB      cyclobenzaprine 10 milliGRAM(s) Oral every 8 hours  dextrose 5%. 1000 milliLiter(s) (50 mL/Hr) IV Continuous <Continuous>  dextrose 5%. 1000 milliLiter(s) (100 mL/Hr) IV Continuous <Continuous>  dextrose 5%. 1000 milliLiter(s) (50 mL/Hr) IV Continuous <Continuous>  dextrose 50% Injectable 25 Gram(s) IV Push once  dextrose 50% Injectable 25 Gram(s) IV Push once  dextrose 50% Injectable 12.5 Gram(s) IV Push once  dextrose 50% Injectable 25 Gram(s) IV Push once  ferrous    sulfate 325 milliGRAM(s) Oral two times a day  glucagon  Injectable 1 milliGRAM(s) IntraMuscular once  glucagon  Injectable 1 milliGRAM(s) IntraMuscular once  heparin   Injectable 5000 Unit(s) SubCutaneous every 8 hours  insulin glargine Injectable (LANTUS) 30 Unit(s) SubCutaneous at bedtime  insulin lispro (ADMELOG) corrective regimen sliding scale   SubCutaneous three times a day before meals  insulin lispro (ADMELOG) corrective regimen sliding scale   SubCutaneous at bedtime  insulin lispro Injectable (ADMELOG) 10 Unit(s) SubCutaneous three times a day before meals  lidocaine 2% Viscous 15 milliLiter(s) Swish and Spit four times a day  metFORMIN 500 milliGRAM(s) Oral daily  metoprolol succinate ER 50 milliGRAM(s) Oral daily  montelukast 10 milliGRAM(s) Oral daily  naloxone Injectable 0.4 milliGRAM(s) IV Push once  pantoprazole    Tablet 40 milliGRAM(s) Oral before breakfast  polyethylene glycol 3350 17 Gram(s) Oral daily  senna 2 Tablet(s) Oral at bedtime  sodium chloride 0.9%. 1000 milliLiter(s) (75 mL/Hr) IV Continuous <Continuous>  vitamin A &amp; D Ointment 1 Application(s) Topical two times a day    MEDICATIONS  (PRN):  acetaminophen     Tablet .. 650 milliGRAM(s) Oral every 6 hours PRN Temp greater or equal to 38C (100.4F), Mild Pain (1 - 3)  benzocaine 15 mG/menthol 3.6 mG Lozenge 1 Lozenge Oral three times a day PRN Sore Throat  bisacodyl 5 milliGRAM(s) Oral every 12 hours PRN Constipation  dextrose Oral Gel 15 Gram(s) Oral once PRN Blood Glucose LESS THAN 70 milliGRAM(s)/deciliter  dextrose Oral Gel 15 Gram(s) Oral once PRN Blood Glucose LESS THAN 70 milliGRAM(s)/deciliter  magnesium hydroxide Suspension 30 milliLiter(s) Oral every 12 hours PRN Constipation  melatonin 3 milliGRAM(s) Oral at bedtime PRN Insomnia  ondansetron Injectable 4 milliGRAM(s) IV Push every 6 hours PRN Nausea and/or Vomiting      Allergies    No Known Allergies    Intolerances    Vital Signs Last 24 Hrs  T(C): 36.5 (25 Jul 2022 05:25), Max: 36.8 (24 Jul 2022 16:57)  T(F): 97.7 (25 Jul 2022 05:25), Max: 98.2 (24 Jul 2022 16:57)  HR: 81 (25 Jul 2022 05:25) (81 - 95)  BP: 131/72 (25 Jul 2022 05:25) (131/72 - 169/70)  BP(mean): --  RR: 17 (25 Jul 2022 05:25) (17 - 18)  SpO2: 98% (25 Jul 2022 05:25) (97% - 99%)    Parameters below as of 25 Jul 2022 05:25  Patient On (Oxygen Delivery Method): room air        PHYSICAL EXAM:  GENERAL: NAD on RA,  well-developed, well-groomed  HEAD:  Atraumatic, Normocephalic  EYES: conjunctiva and sclera clear  ENMT: Moist mucous membranes  NECK: Supple,   CHEST/LUNG: Clear to Auscultation bilaterally; No rales, rhonchi, wheezing, or rubs  HEART: Regular rate and rhythm; No murmurs, rubs, or gallops  ABDOMEN: Soft, Nontender, Nondistended; Bowel sounds present  EXTREMITIES: 2+ Peripheral Pulses, No clubbing, cyanosis, or edema  SKIN: Lumbar surgical wound   NERVOUS SYSTEM:  Alert & Oriented X3, Good concentration; Motor Strength 4/5 B/L upper extremities, Lower extremity strength unable to be assess due to severe pain     LABS:                                                  10.0   12.01 )-----------( 493      ( 25 Jul 2022 06:46 )             29.5   07-25    140  |  108  |  9   ----------------------------<  180<H>  3.7   |  22  |  1.02    Ca    9.3      25 Jul 2022 06:46  Phos  4.8     07-25  Mg     1.8     07-25            Culture - Urine (collected 07-12-22)  Source: Clean Catch Clean Catch (Midstream)  Final Report (07-14-22):    <10,000 CFU/mL Normal Urogenital Mireille  Urinalysis + Microscopic Examination (07.24.22 @ 15:38)    Urine Appearance: Clear    Specific Gravity: 1.005    Leukocyte Esterase Concentration: Trace    Ketone - Urine: Negative    pH Urine: 6.5    Blood, Urine: Negative    Urobilinogen: Negative mg/dL    Color: Yellow    Protein, Urine: 15 mg/dL    Nitrite: Negative    Bilirubin: Negative    Glucose Qualitative, Urine: Negative mg/dL    Red Blood Cell - Urine: 0-2 /HPF    White Blood Cell - Urine: 6-10    Epithelial Cells: Occasional    Bacteria: Occasional          RADIOLOGY & ADDITIONAL TESTS:          Imaging Personally Reviewed:  [ ] YES  [ ] NO    Consultant(s) Notes Reviewed:  [ ] YES  [ ] NO    Care Discussed with Consultants/Other Providers [x ] YES  [ ] NO Patient is a 66y old  Female who presents with a chief complaint of Left leg/thigh pain. (19 Jul 2022 05:54)    [place]  [id number]    INTERVAL HPI/ OVERNIGHT EVENTS: Pt was seen and examined at bedside today,    MEDICATIONS  (STANDING):  atorvastatin Oral Tab/Cap - Peds 10 milliGRAM(s) Oral daily  cefTRIAXone   IVPB      cyclobenzaprine 10 milliGRAM(s) Oral every 8 hours  dextrose 5%. 1000 milliLiter(s) (50 mL/Hr) IV Continuous <Continuous>  dextrose 5%. 1000 milliLiter(s) (100 mL/Hr) IV Continuous <Continuous>  dextrose 5%. 1000 milliLiter(s) (50 mL/Hr) IV Continuous <Continuous>  dextrose 50% Injectable 25 Gram(s) IV Push once  dextrose 50% Injectable 25 Gram(s) IV Push once  dextrose 50% Injectable 12.5 Gram(s) IV Push once  dextrose 50% Injectable 25 Gram(s) IV Push once  ferrous    sulfate 325 milliGRAM(s) Oral two times a day  glucagon  Injectable 1 milliGRAM(s) IntraMuscular once  glucagon  Injectable 1 milliGRAM(s) IntraMuscular once  heparin   Injectable 5000 Unit(s) SubCutaneous every 8 hours  insulin glargine Injectable (LANTUS) 30 Unit(s) SubCutaneous at bedtime  insulin lispro (ADMELOG) corrective regimen sliding scale   SubCutaneous three times a day before meals  insulin lispro (ADMELOG) corrective regimen sliding scale   SubCutaneous at bedtime  insulin lispro Injectable (ADMELOG) 10 Unit(s) SubCutaneous three times a day before meals  lidocaine 2% Viscous 15 milliLiter(s) Swish and Spit four times a day  metFORMIN 500 milliGRAM(s) Oral daily  metoprolol succinate ER 50 milliGRAM(s) Oral daily  montelukast 10 milliGRAM(s) Oral daily  naloxone Injectable 0.4 milliGRAM(s) IV Push once  pantoprazole    Tablet 40 milliGRAM(s) Oral before breakfast  polyethylene glycol 3350 17 Gram(s) Oral daily  senna 2 Tablet(s) Oral at bedtime  sodium chloride 0.9%. 1000 milliLiter(s) (75 mL/Hr) IV Continuous <Continuous>  vitamin A &amp; D Ointment 1 Application(s) Topical two times a day    MEDICATIONS  (PRN):  acetaminophen     Tablet .. 650 milliGRAM(s) Oral every 6 hours PRN Temp greater or equal to 38C (100.4F), Mild Pain (1 - 3)  benzocaine 15 mG/menthol 3.6 mG Lozenge 1 Lozenge Oral three times a day PRN Sore Throat  bisacodyl 5 milliGRAM(s) Oral every 12 hours PRN Constipation  dextrose Oral Gel 15 Gram(s) Oral once PRN Blood Glucose LESS THAN 70 milliGRAM(s)/deciliter  dextrose Oral Gel 15 Gram(s) Oral once PRN Blood Glucose LESS THAN 70 milliGRAM(s)/deciliter  magnesium hydroxide Suspension 30 milliLiter(s) Oral every 12 hours PRN Constipation  melatonin 3 milliGRAM(s) Oral at bedtime PRN Insomnia  ondansetron Injectable 4 milliGRAM(s) IV Push every 6 hours PRN Nausea and/or Vomiting      Allergies    No Known Allergies    Intolerances    Vital Signs Last 24 Hrs  T(C): 36.5 (25 Jul 2022 05:25), Max: 36.8 (24 Jul 2022 16:57)  T(F): 97.7 (25 Jul 2022 05:25), Max: 98.2 (24 Jul 2022 16:57)  HR: 81 (25 Jul 2022 05:25) (81 - 95)  BP: 131/72 (25 Jul 2022 05:25) (131/72 - 169/70)  BP(mean): --  RR: 17 (25 Jul 2022 05:25) (17 - 18)  SpO2: 98% (25 Jul 2022 05:25) (97% - 99%)    Parameters below as of 25 Jul 2022 05:25  Patient On (Oxygen Delivery Method): room air        PHYSICAL EXAM:  GENERAL: NAD on RA,  well-developed, well-groomed  HEAD:  Atraumatic, Normocephalic  EYES: conjunctiva and sclera clear  ENMT: Moist mucous membranes  NECK: Supple,   CHEST/LUNG: Clear to Auscultation bilaterally; No rales, rhonchi, wheezing, or rubs  HEART: Regular rate and rhythm; No murmurs, rubs, or gallops  ABDOMEN: Soft, Nontender, Nondistended; Bowel sounds present  EXTREMITIES: 2+ Peripheral Pulses, No clubbing, cyanosis, or edema  SKIN: Lumbar surgical wound   NERVOUS SYSTEM:  Alert & Oriented X3, Good concentration; Motor Strength 4/5 B/L upper extremities, Lower extremity strength unable to be assess due to severe pain     LABS:                                                  10.0   12.01 )-----------( 493      ( 25 Jul 2022 06:46 )             29.5   07-25    140  |  108  |  9   ----------------------------<  180<H>  3.7   |  22  |  1.02    Ca    9.3      25 Jul 2022 06:46  Phos  4.8     07-25  Mg     1.8     07-25            Culture - Urine (collected 07-12-22)  Source: Clean Catch Clean Catch (Midstream)  Final Report (07-14-22):    <10,000 CFU/mL Normal Urogenital Mireille  Urinalysis + Microscopic Examination (07.24.22 @ 15:38)    Urine Appearance: Clear    Specific Gravity: 1.005    Leukocyte Esterase Concentration: Trace    Ketone - Urine: Negative    pH Urine: 6.5    Blood, Urine: Negative    Urobilinogen: Negative mg/dL    Color: Yellow    Protein, Urine: 15 mg/dL    Nitrite: Negative    Bilirubin: Negative    Glucose Qualitative, Urine: Negative mg/dL    Red Blood Cell - Urine: 0-2 /HPF    White Blood Cell - Urine: 6-10    Epithelial Cells: Occasional    Bacteria: Occasional          RADIOLOGY & ADDITIONAL TESTS:          Imaging Personally Reviewed:  [ ] YES  [ ] NO    Consultant(s) Notes Reviewed:  [ ] YES  [ ] NO    Care Discussed with Consultants/Other Providers [x ] YES  [ ] NO

## 2022-07-25 NOTE — PROGRESS NOTE ADULT - SUBJECTIVE AND OBJECTIVE BOX
Patient is a 66y old  Female who presents with a chief complaint of Left leg/thigh pain. (2022 07:06)      Interval History: Finger-sticks are in high 100's and low 200's   on Lantus 30 units and prandial lispro 10 units and Metformin 500 mg QD     MEDICATIONS  (STANDING):  atorvastatin Oral Tab/Cap - Peds 10 milliGRAM(s) Oral daily  cefTRIAXone   IVPB      cefTRIAXone   IVPB 1000 milliGRAM(s) IV Intermittent every 24 hours  cyclobenzaprine 10 milliGRAM(s) Oral every 8 hours  dextrose 5%. 1000 milliLiter(s) (50 mL/Hr) IV Continuous <Continuous>  dextrose 5%. 1000 milliLiter(s) (100 mL/Hr) IV Continuous <Continuous>  dextrose 5%. 1000 milliLiter(s) (50 mL/Hr) IV Continuous <Continuous>  dextrose 50% Injectable 25 Gram(s) IV Push once  dextrose 50% Injectable 25 Gram(s) IV Push once  dextrose 50% Injectable 12.5 Gram(s) IV Push once  dextrose 50% Injectable 25 Gram(s) IV Push once  ferrous    sulfate 325 milliGRAM(s) Oral two times a day  glucagon  Injectable 1 milliGRAM(s) IntraMuscular once  glucagon  Injectable 1 milliGRAM(s) IntraMuscular once  heparin   Injectable 5000 Unit(s) SubCutaneous every 8 hours  insulin glargine Injectable (LANTUS) 30 Unit(s) SubCutaneous at bedtime  insulin lispro (ADMELOG) corrective regimen sliding scale   SubCutaneous three times a day before meals  insulin lispro (ADMELOG) corrective regimen sliding scale   SubCutaneous at bedtime  insulin lispro Injectable (ADMELOG) 10 Unit(s) SubCutaneous three times a day before meals  metFORMIN 500 milliGRAM(s) Oral two times a day  metoprolol succinate ER 50 milliGRAM(s) Oral daily  montelukast 10 milliGRAM(s) Oral daily  naloxone Injectable 0.4 milliGRAM(s) IV Push once  pantoprazole    Tablet 40 milliGRAM(s) Oral before breakfast  polyethylene glycol 3350 17 Gram(s) Oral daily  senna 2 Tablet(s) Oral at bedtime  sodium chloride 0.9%. 1000 milliLiter(s) (75 mL/Hr) IV Continuous <Continuous>  vitamin A &amp; D Ointment 1 Application(s) Topical two times a day    MEDICATIONS  (PRN):  acetaminophen     Tablet .. 650 milliGRAM(s) Oral every 6 hours PRN Temp greater or equal to 38C (100.4F), Mild Pain (1 - 3)  benzocaine 15 mG/menthol 3.6 mG Lozenge 1 Lozenge Oral three times a day PRN Sore Throat  bisacodyl 5 milliGRAM(s) Oral every 12 hours PRN Constipation  dextrose Oral Gel 15 Gram(s) Oral once PRN Blood Glucose LESS THAN 70 milliGRAM(s)/deciliter  dextrose Oral Gel 15 Gram(s) Oral once PRN Blood Glucose LESS THAN 70 milliGRAM(s)/deciliter  magnesium hydroxide Suspension 30 milliLiter(s) Oral every 12 hours PRN Constipation  melatonin 3 milliGRAM(s) Oral at bedtime PRN Insomnia  ondansetron Injectable 4 milliGRAM(s) IV Push every 6 hours PRN Nausea and/or Vomiting      Allergies    No Known Allergies    Intolerances        REVIEW OF SYSTEMS:  CONSTITUTIONAL: no changes  EYES: No eye pain, visual disturbances, or discharge  ENMT:  No difficulty hearing, No sinus or throat pain  NECK: No pain or stiffness  RESPIRATORY: No cough, wheezing, chills or hemoptysis; No shortness of breath  CARDIOVASCULAR: No chest pain, palpitations or leg swelling  GASTROINTESTINAL: No abdominal or epigastric pain. No nausea, vomiting, or hematemesis; No diarrhea or constipation. No melena or hematochezia.  GENITOURINARY: No dysuria, frequency, hematuria, or incontinence  NEUROLOGICAL: No headaches, memory loss, loss of strength, numbness, or tremors  SKIN: No itching, burning, rashes, or lesions   ENDOCRINE: No heat or cold intolerance; No hair loss  MUSCULOSKELETAL: No joint pain or swelling; No muscle, back, or extremity pain  PSYCHIATRIC: No depression, anxiety, mood swings, or difficulty sleeping  HEME/LYMPH: No easy bruising, or bleeding gums  ALLERY AND IMMUNOLOGIC: No hives or eczema    Vital Signs Last 24 Hrs  T(C): 36.8 (2022 05:30), Max: 36.8 (2022 11:47)  T(F): 98.2 (2022 05:30), Max: 98.2 (2022 11:47)  HR: 79 (2022 05:30) (79 - 86)  BP: 162/85 (2022 05:30) (150/74 - 162/85)  BP(mean): --  RR: 18 (2022 05:30) (17 - 18)  SpO2: 95% (2022 05:30) (95% - 100%)    Parameters below as of 2022 05:30  Patient On (Oxygen Delivery Method): room air        PHYSICAL EXAM:  GENERAL:   HEAD: Atraumatic, Normocephalic  EYES: PERRLA, conjunctiva and sclera clear  ENMT: No  exudates,; Moist mucous membranes,, No lesions  NECK: Supple, No JVD, Normal thyroid  NERVOUS SYSTEM:  Alert & Oriented,   CHEST/LUNG: Clear to auscultation bilaterally; No rales, rhonchi, wheezing, or rubs  HEART: Regular rate and rhythm; No murmurs, rubs, or gallops  ABDOMEN: Soft, Nontender, Nondistended; Bowel sounds present  EXTREMITIES:  2+ Peripheral Pulses, no edema  SKIN: No rashes or lesions    LABS:      Urinalysis Basic - ( 2022 15:38 )    Color: Yellow / Appearance: Clear / S.005 / pH: x  Gluc: x / Ketone: Negative  / Bili: Negative / Urobili: Negative mg/dL   Blood: x / Protein: 15 mg/dL / Nitrite: Negative   Leuk Esterase: Trace / RBC: 0-2 /HPF / WBC 6-10   Sq Epi: x / Non Sq Epi: Occasional / Bacteria: Occasional      CAPILLARY BLOOD GLUCOSE      POCT Blood Glucose.: 216 mg/dL (2022 07:47)  POCT Blood Glucose.: 201 mg/dL (2022 21:11)  POCT Blood Glucose.: 199 mg/dL (2022 16:05)  POCT Blood Glucose.: 193 mg/dL (2022 11:24)    Lipid panel:           Thyroid:  Diabetes Tests:  Parathyroid Panel:  Adrenals:  RADIOLOGY & ADDITIONAL TESTS:    Imaging Personally Reviewed:  [ ] YES  [ ] NO    Consultant(s) Notes Reviewed:  [ ] YES  [ ] NO    Care Discussed with Consultants/Other Providers [ ] YES  [ ] NO

## 2022-07-26 LAB
FLUAV AG NPH QL: SIGNIFICANT CHANGE UP
FLUBV AG NPH QL: SIGNIFICANT CHANGE UP
GLUCOSE BLDC GLUCOMTR-MCNC: 180 MG/DL — HIGH (ref 70–99)
GLUCOSE BLDC GLUCOMTR-MCNC: 194 MG/DL — HIGH (ref 70–99)
GLUCOSE BLDC GLUCOMTR-MCNC: 216 MG/DL — HIGH (ref 70–99)
GLUCOSE BLDC GLUCOMTR-MCNC: 290 MG/DL — HIGH (ref 70–99)
HCT VFR BLD CALC: 31.8 % — LOW (ref 34.5–45)
HGB BLD-MCNC: 10.8 G/DL — LOW (ref 11.5–15.5)
MCHC RBC-ENTMCNC: 29.5 PG — SIGNIFICANT CHANGE UP (ref 27–34)
MCHC RBC-ENTMCNC: 34 G/DL — SIGNIFICANT CHANGE UP (ref 32–36)
MCV RBC AUTO: 86.9 FL — SIGNIFICANT CHANGE UP (ref 80–100)
NRBC # BLD: 0 /100 WBCS — SIGNIFICANT CHANGE UP (ref 0–0)
PLATELET # BLD AUTO: 494 K/UL — HIGH (ref 150–400)
RBC # BLD: 3.66 M/UL — LOW (ref 3.8–5.2)
RBC # FLD: 13.7 % — SIGNIFICANT CHANGE UP (ref 10.3–14.5)
SARS-COV-2 RNA SPEC QL NAA+PROBE: SIGNIFICANT CHANGE UP
WBC # BLD: 11.53 K/UL — HIGH (ref 3.8–10.5)
WBC # FLD AUTO: 11.53 K/UL — HIGH (ref 3.8–10.5)

## 2022-07-26 PROCEDURE — 99232 SBSQ HOSP IP/OBS MODERATE 35: CPT

## 2022-07-26 RX ORDER — METFORMIN HYDROCHLORIDE 850 MG/1
500 TABLET ORAL
Refills: 0 | Status: DISCONTINUED | OUTPATIENT
Start: 2022-07-26 | End: 2022-07-29

## 2022-07-26 RX ADMIN — INSULIN GLARGINE 30 UNIT(S): 100 INJECTION, SOLUTION SUBCUTANEOUS at 21:54

## 2022-07-26 RX ADMIN — Medication 10 UNIT(S): at 11:58

## 2022-07-26 RX ADMIN — POLYETHYLENE GLYCOL 3350 17 GRAM(S): 17 POWDER, FOR SOLUTION ORAL at 11:57

## 2022-07-26 RX ADMIN — HEPARIN SODIUM 5000 UNIT(S): 5000 INJECTION INTRAVENOUS; SUBCUTANEOUS at 21:53

## 2022-07-26 RX ADMIN — MONTELUKAST 10 MILLIGRAM(S): 4 TABLET, CHEWABLE ORAL at 11:57

## 2022-07-26 RX ADMIN — CYCLOBENZAPRINE HYDROCHLORIDE 10 MILLIGRAM(S): 10 TABLET, FILM COATED ORAL at 21:53

## 2022-07-26 RX ADMIN — Medication 325 MILLIGRAM(S): at 18:42

## 2022-07-26 RX ADMIN — Medication 4: at 08:31

## 2022-07-26 RX ADMIN — Medication 2: at 16:40

## 2022-07-26 RX ADMIN — CYCLOBENZAPRINE HYDROCHLORIDE 10 MILLIGRAM(S): 10 TABLET, FILM COATED ORAL at 14:24

## 2022-07-26 RX ADMIN — HEPARIN SODIUM 5000 UNIT(S): 5000 INJECTION INTRAVENOUS; SUBCUTANEOUS at 14:24

## 2022-07-26 RX ADMIN — ATORVASTATIN CALCIUM 10 MILLIGRAM(S): 80 TABLET, FILM COATED ORAL at 11:56

## 2022-07-26 RX ADMIN — SENNA PLUS 2 TABLET(S): 8.6 TABLET ORAL at 21:56

## 2022-07-26 RX ADMIN — HEPARIN SODIUM 5000 UNIT(S): 5000 INJECTION INTRAVENOUS; SUBCUTANEOUS at 06:30

## 2022-07-26 RX ADMIN — Medication 6: at 11:58

## 2022-07-26 RX ADMIN — Medication 10 UNIT(S): at 16:40

## 2022-07-26 RX ADMIN — Medication 1 APPLICATION(S): at 06:35

## 2022-07-26 RX ADMIN — CYCLOBENZAPRINE HYDROCHLORIDE 10 MILLIGRAM(S): 10 TABLET, FILM COATED ORAL at 06:29

## 2022-07-26 RX ADMIN — Medication 1 APPLICATION(S): at 18:30

## 2022-07-26 RX ADMIN — SODIUM CHLORIDE 75 MILLILITER(S): 9 INJECTION INTRAMUSCULAR; INTRAVENOUS; SUBCUTANEOUS at 06:29

## 2022-07-26 RX ADMIN — Medication 10 UNIT(S): at 08:30

## 2022-07-26 RX ADMIN — CEFTRIAXONE 100 MILLIGRAM(S): 500 INJECTION, POWDER, FOR SOLUTION INTRAMUSCULAR; INTRAVENOUS at 09:54

## 2022-07-26 RX ADMIN — Medication 50 MILLIGRAM(S): at 06:29

## 2022-07-26 RX ADMIN — PANTOPRAZOLE SODIUM 40 MILLIGRAM(S): 20 TABLET, DELAYED RELEASE ORAL at 08:31

## 2022-07-26 RX ADMIN — METFORMIN HYDROCHLORIDE 500 MILLIGRAM(S): 850 TABLET ORAL at 18:41

## 2022-07-26 RX ADMIN — Medication 325 MILLIGRAM(S): at 06:29

## 2022-07-26 NOTE — PROGRESS NOTE ADULT - SUBJECTIVE AND OBJECTIVE BOX
Patient is a 66y old  Female who presents with a chief complaint of Left leg/thigh pain. (19 Jul 2022 05:54)    Greek      INTERVAL HPI/ OVERNIGHT EVENTS: Pt was seen and examined at bedside today,  MEDICATIONS  (STANDING):  atorvastatin Oral Tab/Cap - Peds 10 milliGRAM(s) Oral daily  cefTRIAXone   IVPB      cefTRIAXone   IVPB 1000 milliGRAM(s) IV Intermittent every 24 hours  cyclobenzaprine 10 milliGRAM(s) Oral every 8 hours  dextrose 5%. 1000 milliLiter(s) (50 mL/Hr) IV Continuous <Continuous>  dextrose 5%. 1000 milliLiter(s) (100 mL/Hr) IV Continuous <Continuous>  dextrose 5%. 1000 milliLiter(s) (50 mL/Hr) IV Continuous <Continuous>  dextrose 50% Injectable 25 Gram(s) IV Push once  dextrose 50% Injectable 25 Gram(s) IV Push once  dextrose 50% Injectable 12.5 Gram(s) IV Push once  dextrose 50% Injectable 25 Gram(s) IV Push once  ferrous    sulfate 325 milliGRAM(s) Oral two times a day  glucagon  Injectable 1 milliGRAM(s) IntraMuscular once  glucagon  Injectable 1 milliGRAM(s) IntraMuscular once  heparin   Injectable 5000 Unit(s) SubCutaneous every 8 hours  insulin glargine Injectable (LANTUS) 30 Unit(s) SubCutaneous at bedtime  insulin lispro (ADMELOG) corrective regimen sliding scale   SubCutaneous three times a day before meals  insulin lispro (ADMELOG) corrective regimen sliding scale   SubCutaneous at bedtime  insulin lispro Injectable (ADMELOG) 10 Unit(s) SubCutaneous three times a day before meals  metFORMIN 500 milliGRAM(s) Oral two times a day  metoprolol succinate ER 50 milliGRAM(s) Oral daily  montelukast 10 milliGRAM(s) Oral daily  naloxone Injectable 0.4 milliGRAM(s) IV Push once  pantoprazole    Tablet 40 milliGRAM(s) Oral before breakfast  polyethylene glycol 3350 17 Gram(s) Oral daily  senna 2 Tablet(s) Oral at bedtime  vitamin A &amp; D Ointment 1 Application(s) Topical two times a day    MEDICATIONS  (PRN):  acetaminophen     Tablet .. 650 milliGRAM(s) Oral every 6 hours PRN Temp greater or equal to 38C (100.4F), Mild Pain (1 - 3)  benzocaine 15 mG/menthol 3.6 mG Lozenge 1 Lozenge Oral three times a day PRN Sore Throat  bisacodyl 5 milliGRAM(s) Oral every 12 hours PRN Constipation  dextrose Oral Gel 15 Gram(s) Oral once PRN Blood Glucose LESS THAN 70 milliGRAM(s)/deciliter  dextrose Oral Gel 15 Gram(s) Oral once PRN Blood Glucose LESS THAN 70 milliGRAM(s)/deciliter  magnesium hydroxide Suspension 30 milliLiter(s) Oral every 12 hours PRN Constipation  melatonin 3 milliGRAM(s) Oral at bedtime PRN Insomnia  ondansetron Injectable 4 milliGRAM(s) IV Push every 6 hours PRN Nausea and/or Vomiting    Allergies    No Known Allergies    Intolerances    Vital Signs Last 24 Hrs  T(C): 37 (26 Jul 2022 12:03), Max: 37 (26 Jul 2022 12:03)  T(F): 98.6 (26 Jul 2022 12:03), Max: 98.6 (26 Jul 2022 12:03)  HR: 84 (26 Jul 2022 12:03) (79 - 86)  BP: 148/69 (26 Jul 2022 12:03) (148/69 - 162/85)  BP(mean): --  RR: 17 (26 Jul 2022 12:03) (17 - 18)  SpO2: 98% (26 Jul 2022 12:03) (95% - 100%)    Parameters below as of 26 Jul 2022 05:30  Patient On (Oxygen Delivery Method): room air          PHYSICAL EXAM:  GENERAL: NAD on RA,  well-developed, well-groomed  HEAD:  Atraumatic, Normocephalic  EYES: conjunctiva and sclera clear  ENMT: Moist mucous membranes  NECK: Supple,   CHEST/LUNG: Clear to Auscultation bilaterally; No rales, rhonchi, wheezing, or rubs  HEART: Regular rate and rhythm; No murmurs, rubs, or gallops  ABDOMEN: Soft, Nontender, Nondistended; Bowel sounds present  EXTREMITIES: 2+ Peripheral Pulses, No clubbing, cyanosis, or edema  SKIN: Lumbar surgical wound   NERVOUS SYSTEM:  Alert & Oriented X3, Good concentration; Motor Strength 4/5 B/L upper extremities, Lower extremity strength unable to be assess due to severe pain     LABS:                                                10.8   11.53 )-----------( 494      ( 26 Jul 2022 08:14 )             31.8   07-25    140  |  108  |  9   ----------------------------<  180<H>  3.7   |  22  |  1.02    Ca    9.3      25 Jul 2022 06:46  Phos  4.8     07-25  Mg     1.8     07-25        Culture - Urine (collected 07-12-22)  Source: Clean Catch Clean Catch (Midstream)  Final Report (07-14-22):    <10,000 CFU/mL Normal Urogenital Mireille  Urinalysis + Microscopic Examination (07.24.22 @ 15:38)    Urine Appearance: Clear    Specific Gravity: 1.005    Leukocyte Esterase Concentration: Trace    Ketone - Urine: Negative    pH Urine: 6.5    Blood, Urine: Negative    Urobilinogen: Negative mg/dL    Color: Yellow    Protein, Urine: 15 mg/dL    Nitrite: Negative    Bilirubin: Negative    Glucose Qualitative, Urine: Negative mg/dL    Red Blood Cell - Urine: 0-2 /HPF    White Blood Cell - Urine: 6-10    Epithelial Cells: Occasional    Bacteria: Occasional          RADIOLOGY & ADDITIONAL TESTS:          Imaging Personally Reviewed:  [ ] YES  [ ] NO    Consultant(s) Notes Reviewed:  [ ] YES  [ ] NO    Care Discussed with Consultants/Other Providers [x ] YES  [ ] NO Patient is a 66y old  Female who presents with a chief complaint of Left leg/thigh pain. (19 Jul 2022 05:54)    Sami  [id number]    INTERVAL HPI/ OVERNIGHT EVENTS: Pt was seen and examined at bedside today,  MEDICATIONS  (STANDING):  atorvastatin Oral Tab/Cap - Peds 10 milliGRAM(s) Oral daily  cefTRIAXone   IVPB      cefTRIAXone   IVPB 1000 milliGRAM(s) IV Intermittent every 24 hours  cyclobenzaprine 10 milliGRAM(s) Oral every 8 hours  dextrose 5%. 1000 milliLiter(s) (50 mL/Hr) IV Continuous <Continuous>  dextrose 5%. 1000 milliLiter(s) (100 mL/Hr) IV Continuous <Continuous>  dextrose 5%. 1000 milliLiter(s) (50 mL/Hr) IV Continuous <Continuous>  dextrose 50% Injectable 25 Gram(s) IV Push once  dextrose 50% Injectable 25 Gram(s) IV Push once  dextrose 50% Injectable 12.5 Gram(s) IV Push once  dextrose 50% Injectable 25 Gram(s) IV Push once  ferrous    sulfate 325 milliGRAM(s) Oral two times a day  glucagon  Injectable 1 milliGRAM(s) IntraMuscular once  glucagon  Injectable 1 milliGRAM(s) IntraMuscular once  heparin   Injectable 5000 Unit(s) SubCutaneous every 8 hours  insulin glargine Injectable (LANTUS) 30 Unit(s) SubCutaneous at bedtime  insulin lispro (ADMELOG) corrective regimen sliding scale   SubCutaneous three times a day before meals  insulin lispro (ADMELOG) corrective regimen sliding scale   SubCutaneous at bedtime  insulin lispro Injectable (ADMELOG) 10 Unit(s) SubCutaneous three times a day before meals  metFORMIN 500 milliGRAM(s) Oral two times a day  metoprolol succinate ER 50 milliGRAM(s) Oral daily  montelukast 10 milliGRAM(s) Oral daily  naloxone Injectable 0.4 milliGRAM(s) IV Push once  pantoprazole    Tablet 40 milliGRAM(s) Oral before breakfast  polyethylene glycol 3350 17 Gram(s) Oral daily  senna 2 Tablet(s) Oral at bedtime  vitamin A &amp; D Ointment 1 Application(s) Topical two times a day    MEDICATIONS  (PRN):  acetaminophen     Tablet .. 650 milliGRAM(s) Oral every 6 hours PRN Temp greater or equal to 38C (100.4F), Mild Pain (1 - 3)  benzocaine 15 mG/menthol 3.6 mG Lozenge 1 Lozenge Oral three times a day PRN Sore Throat  bisacodyl 5 milliGRAM(s) Oral every 12 hours PRN Constipation  dextrose Oral Gel 15 Gram(s) Oral once PRN Blood Glucose LESS THAN 70 milliGRAM(s)/deciliter  dextrose Oral Gel 15 Gram(s) Oral once PRN Blood Glucose LESS THAN 70 milliGRAM(s)/deciliter  magnesium hydroxide Suspension 30 milliLiter(s) Oral every 12 hours PRN Constipation  melatonin 3 milliGRAM(s) Oral at bedtime PRN Insomnia  ondansetron Injectable 4 milliGRAM(s) IV Push every 6 hours PRN Nausea and/or Vomiting    Allergies    No Known Allergies    Intolerances    Vital Signs Last 24 Hrs  T(C): 37 (26 Jul 2022 12:03), Max: 37 (26 Jul 2022 12:03)  T(F): 98.6 (26 Jul 2022 12:03), Max: 98.6 (26 Jul 2022 12:03)  HR: 84 (26 Jul 2022 12:03) (79 - 86)  BP: 148/69 (26 Jul 2022 12:03) (148/69 - 162/85)  BP(mean): --  RR: 17 (26 Jul 2022 12:03) (17 - 18)  SpO2: 98% (26 Jul 2022 12:03) (95% - 100%)    Parameters below as of 26 Jul 2022 05:30  Patient On (Oxygen Delivery Method): room air          PHYSICAL EXAM:  GENERAL: NAD on RA,  well-developed, well-groomed  HEAD:  Atraumatic, Normocephalic  EYES: conjunctiva and sclera clear  ENMT: Moist mucous membranes  NECK: Supple,   CHEST/LUNG: Clear to Auscultation bilaterally; No rales, rhonchi, wheezing, or rubs  HEART: Regular rate and rhythm; No murmurs, rubs, or gallops  ABDOMEN: Soft, Nontender, Nondistended; Bowel sounds present  EXTREMITIES: 2+ Peripheral Pulses, No clubbing, cyanosis, or edema  SKIN: Lumbar surgical wound   NERVOUS SYSTEM:  Alert & Oriented X3, Good concentration; Motor Strength 4/5 B/L upper extremities, Lower extremity strength unable to be assess due to severe pain     LABS:                                                10.8   11.53 )-----------( 494      ( 26 Jul 2022 08:14 )             31.8   07-25    140  |  108  |  9   ----------------------------<  180<H>  3.7   |  22  |  1.02    Ca    9.3      25 Jul 2022 06:46  Phos  4.8     07-25  Mg     1.8     07-25        Culture - Urine (collected 07-12-22)  Source: Clean Catch Clean Catch (Midstream)  Final Report (07-14-22):    <10,000 CFU/mL Normal Urogenital Mireille  Urinalysis + Microscopic Examination (07.24.22 @ 15:38)    Urine Appearance: Clear    Specific Gravity: 1.005    Leukocyte Esterase Concentration: Trace    Ketone - Urine: Negative    pH Urine: 6.5    Blood, Urine: Negative    Urobilinogen: Negative mg/dL    Color: Yellow    Protein, Urine: 15 mg/dL    Nitrite: Negative    Bilirubin: Negative    Glucose Qualitative, Urine: Negative mg/dL    Red Blood Cell - Urine: 0-2 /HPF    White Blood Cell - Urine: 6-10    Epithelial Cells: Occasional    Bacteria: Occasional          RADIOLOGY & ADDITIONAL TESTS:          Imaging Personally Reviewed:  [ ] YES  [ ] NO    Consultant(s) Notes Reviewed:  [ ] YES  [ ] NO    Care Discussed with Consultants/Other Providers [x ] YES  [ ] NO

## 2022-07-26 NOTE — PROGRESS NOTE ADULT - SUBJECTIVE AND OBJECTIVE BOX
Patient seen and examined at bedside this AM. Pain well controlled and pt reports doing well. Patient denies any numbness, tingling, weakness, or any other orthopaedic complaint. Denies N/V/CP/SOB. Ambulating well with walker.    LABS:                        10.0   12.01 )-----------( 493      ( 2022 06:46 )             29.5     07-25    140  |  108  |  9   ----------------------------<  180<H>  3.7   |  22  |  1.02    Ca    9.3      2022 06:46  Phos  4.8     -  Mg     1.8     -        Urinalysis Basic - ( 2022 15:38 )    Color: Yellow / Appearance: Clear / S.005 / pH: x  Gluc: x / Ketone: Negative  / Bili: Negative / Urobili: Negative mg/dL   Blood: x / Protein: 15 mg/dL / Nitrite: Negative   Leuk Esterase: Trace / RBC: 0-2 /HPF / WBC 6-10   Sq Epi: x / Non Sq Epi: Occasional / Bacteria: Occasional        VITAL SIGNS:  T(C): 36.8 (22 @ 05:30), Max: 36.8 (22 @ 11:47)  HR: 79 (22 @ 05:30) (79 - 86)  BP: 162/85 (22 @ 05:30) (150/74 - 162/85)  RR: 18 (22 @ 05:30) (17 - 18)  SpO2: 95% (22 @ 05:30) (95% - 100%)    PE  General: awake, alert     Spine:  dressing CDI  +mild dee dee-incisional TTP  + radial pulses  + DP Pulses    LE Motor:                     L2                  L3             L4              L5            S1  R            5/5                5/5             5/5            5/5          5/5  L             5/5                5/5            5/5            5/5          5/5    LE Sensory:               L2          L3         L4      L5       S1         (0=absent, 1=impaired, 2=normal, NT=not testable)  R         2            2            2        2        2  L          2            2           2        2         2        A/P:  66y F s/p L4-5 decompression, TLIF POD 12    -Patient passed TOV, , no further patton needed  -Medical comanagement and endo recs appreciated  -PT/OT   -WBAT  -Pain Control PRN  -DVT ppx - OK for SubQ heparin 5000U q8 hrs   -Encourage ambulation / SCDs  -FU AM Labs  -Incentive Spirometry  -Urology consulted, recs appreciated, f/u outpatient for further management and monitoring if neurogenic bladder returns  -Continue with Bowel Regimen   -Patient doing much better, ambulating with significantly decreased pain/ discomfort --> ambulating well  -No further acute orthopaedic surgical intervention indicated. Orthopaedically stable for d/c w/ outpatient followup   -Dispo: [facility]; Pending authorization from insurance co for d/c  -Will Discuss with attending Dr. Christopher and advise if any changes to plan

## 2022-07-26 NOTE — PROGRESS NOTE ADULT - SUBJECTIVE AND OBJECTIVE BOX
Patient is a 66y old  Female who presents with a chief complaint of Left leg/thigh pain. (27 Jul 2022 16:25)      Interval History: finger sticks are in low 200s   on Lantus 30 units and prandial lispro 10 units an dme increased to BID now     MEDICATIONS  (STANDING):  atorvastatin Oral Tab/Cap - Peds 10 milliGRAM(s) Oral daily  cefTRIAXone   IVPB      cefTRIAXone   IVPB 1000 milliGRAM(s) IV Intermittent every 24 hours  cyclobenzaprine 10 milliGRAM(s) Oral every 8 hours  dextrose 5%. 1000 milliLiter(s) (50 mL/Hr) IV Continuous <Continuous>  dextrose 5%. 1000 milliLiter(s) (50 mL/Hr) IV Continuous <Continuous>  dextrose 5%. 1000 milliLiter(s) (100 mL/Hr) IV Continuous <Continuous>  dextrose 50% Injectable 25 Gram(s) IV Push once  dextrose 50% Injectable 25 Gram(s) IV Push once  dextrose 50% Injectable 12.5 Gram(s) IV Push once  dextrose 50% Injectable 25 Gram(s) IV Push once  ferrous    sulfate 325 milliGRAM(s) Oral two times a day  glucagon  Injectable 1 milliGRAM(s) IntraMuscular once  glucagon  Injectable 1 milliGRAM(s) IntraMuscular once  heparin   Injectable 5000 Unit(s) SubCutaneous every 8 hours  insulin glargine Injectable (LANTUS) 30 Unit(s) SubCutaneous at bedtime  insulin lispro (ADMELOG) corrective regimen sliding scale   SubCutaneous three times a day before meals  insulin lispro (ADMELOG) corrective regimen sliding scale   SubCutaneous at bedtime  insulin lispro Injectable (ADMELOG) 10 Unit(s) SubCutaneous three times a day before meals  lactulose Syrup 20 Gram(s) Oral once  metFORMIN 500 milliGRAM(s) Oral two times a day  metoprolol succinate ER 50 milliGRAM(s) Oral daily  montelukast 10 milliGRAM(s) Oral daily  naloxone Injectable 0.4 milliGRAM(s) IV Push once  pantoprazole    Tablet 40 milliGRAM(s) Oral before breakfast  polyethylene glycol 3350 17 Gram(s) Oral daily  senna 2 Tablet(s) Oral at bedtime  vitamin A &amp; D Ointment 1 Application(s) Topical two times a day    MEDICATIONS  (PRN):  acetaminophen     Tablet .. 650 milliGRAM(s) Oral every 6 hours PRN Temp greater or equal to 38C (100.4F), Mild Pain (1 - 3)  benzocaine 15 mG/menthol 3.6 mG Lozenge 1 Lozenge Oral three times a day PRN Sore Throat  bisacodyl 5 milliGRAM(s) Oral every 12 hours PRN Constipation  dextrose Oral Gel 15 Gram(s) Oral once PRN Blood Glucose LESS THAN 70 milliGRAM(s)/deciliter  dextrose Oral Gel 15 Gram(s) Oral once PRN Blood Glucose LESS THAN 70 milliGRAM(s)/deciliter  magnesium hydroxide Suspension 30 milliLiter(s) Oral every 12 hours PRN Constipation  melatonin 3 milliGRAM(s) Oral at bedtime PRN Insomnia  ondansetron Injectable 4 milliGRAM(s) IV Push every 6 hours PRN Nausea and/or Vomiting      Allergies    No Known Allergies    Intolerances        REVIEW OF SYSTEMS:  CONSTITUTIONAL: no changes  EYES: No eye pain, visual disturbances, or discharge  ENMT:  No difficulty hearing, No sinus or throat pain  NECK: No pain or stiffness  RESPIRATORY: No cough, wheezing, chills or hemoptysis; No shortness of breath  CARDIOVASCULAR: No chest pain, palpitations or leg swelling  GASTROINTESTINAL: No abdominal or epigastric pain. No nausea, vomiting, or hematemesis; No diarrhea or constipation. No melena or hematochezia.  GENITOURINARY: No dysuria, frequency, hematuria, or incontinence  NEUROLOGICAL: No headaches, memory loss, loss of strength, numbness, or tremors  SKIN: No itching, burning, rashes, or lesions   ENDOCRINE: No heat or cold intolerance; No hair loss  MUSCULOSKELETAL: No joint pain or swelling; No muscle, back, or extremity pain  PSYCHIATRIC: No depression, anxiety, mood swings, or difficulty sleeping  HEME/LYMPH: No easy bruising, or bleeding gums  ALLERY AND IMMUNOLOGIC: No hives or eczema    Vital Signs Last 24 Hrs  T(C): 36.6 (27 Jul 2022 16:38), Max: 36.9 (27 Jul 2022 00:03)  T(F): 97.8 (27 Jul 2022 16:38), Max: 98.4 (27 Jul 2022 00:03)  HR: 80 (27 Jul 2022 16:38) (80 - 93)  BP: 125/73 (27 Jul 2022 16:38) (125/73 - 151/80)  BP(mean): --  RR: 18 (27 Jul 2022 16:38) (18 - 19)  SpO2: 100% (27 Jul 2022 16:38) (95% - 100%)    Parameters below as of 27 Jul 2022 12:15  Patient On (Oxygen Delivery Method): room air        PHYSICAL EXAM:  GENERAL:   HEAD: Atraumatic, Normocephalic  EYES: PERRLA, conjunctiva and sclera clear  ENMT: No  exudates,; Moist mucous membranes,, No lesions  NECK: Supple, No JVD, Normal thyroid  NERVOUS SYSTEM:  Alert & Oriented,   CHEST/LUNG: Clear to auscultation bilaterally; No rales, rhonchi, wheezing, or rubs  HEART: Regular rate and rhythm; No murmurs, rubs, or gallops  ABDOMEN: Soft, Nontender, Nondistended; Bowel sounds present  EXTREMITIES:  2+ Peripheral Pulses, no edema  SKIN: No rashes or lesions    LABS:        CAPILLARY BLOOD GLUCOSE      POCT Blood Glucose.: 108 mg/dL (27 Jul 2022 15:19)  POCT Blood Glucose.: 293 mg/dL (27 Jul 2022 12:19)  POCT Blood Glucose.: 229 mg/dL (27 Jul 2022 10:59)  POCT Blood Glucose.: 186 mg/dL (27 Jul 2022 07:26)  POCT Blood Glucose.: 180 mg/dL (26 Jul 2022 21:41)    Lipid panel:           Thyroid:  Diabetes Tests:  Parathyroid Panel:  Adrenals:  RADIOLOGY & ADDITIONAL TESTS:    Imaging Personally Reviewed:  [ ] YES  [ ] NO    Consultant(s) Notes Reviewed:  [ ] YES  [ ] NO    Care Discussed with Consultants/Other Providers [ ] YES  [ ] NO

## 2022-07-26 NOTE — PROGRESS NOTE ADULT - ASSESSMENT
66F Romanian speaking PMHx of HTN, HLD, DM 2  presenting with left hip pain x 1-2 days. Described as mild, achy, shooting pain down left buttocks to knee. Exacerbated w/ standing and position. Pain level is 8/10 in intensity, not getting better after multiple doses of pain meds in ER. Otherwise, the patient denies any neck pain, headaches, chest pain, shortness of breath, n/v/d, abdominal pain, recent illness, fevers, chills, recent spinal/back surgeries or procedures, bowel or bladder incontinence, bowel or bladder retention, constipation, IV drug use, known cancer history, recent weight loss, trauma, weakness, other subjective neurological deficits, numbness/tingling, dysuria, hematuria, rashes. Ct Abd/Pelvis and CT Lumbar Spine :Mild motion artifact in the abdomen. No evidence of acute abdominal or pelvic abnormality. No evidence of acute lumbar spine abnormality. Mild degenerative changes of the lower lumbar spine. US negative for DVT.     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   -CT Lumbar Spine: L3-4: Disc bulge slightly asymmetric to the left. Mild bilateral facet   arthropathy. Mild spinal canal stenosis and minimal left neural foraminal   narrowing.  L4-5: Moderate disc bulge, mild bilateral facet arthropathy and   thickening of ligamenta flava. Moderate to severe spinal canal stenosis.   Mild to moderate left greater than right neural foraminal narrowing.  L5-S1: Mild disc bulge. Moderate bilateral facet arthropathy.   Mild/moderate spinal canal stenosis. Mass effect on the traversing   bilateral S1 nerve roots. Mild bilateral neural foraminal narrowing.  Orthopedic on board   7/14 L4-5 decompression, TLIF ( transforaminal lumbar interbody fusion)  -cont w/ analgesics prn   -cont w/  lumosacral orthosis brace; [facility] on discharge   7/23/2022 appreciate ortho note    Acute Toxic Metabolic Encephalopathy   per my colleague's documentation was presumed secondary to opioid  Head CT: no acute changes    cont to monitor mentation with any  opioid use     DM II   HgA1c: 8.4% uncontrolled   cont w/  lantus and prandial insulin   cont w/ FS monitoring w/ insulin s/s coverage for now   7/20/2022 FS still elevated will d/w endocrine    CHRIS-on Stage 2 chronic kidney disease  avoid nephrotoxic medications, renal dose meds   cont to monitor renal function as needed    7/20/2022 at baseline  7/21/2022 voiding per nurse patton was removed     Hypertension   cont w/ Metoprolol.  7/23/2022 consider increase metoprolol  7/24/2022 will increase toprol xl    Dyslipidemia.   cont w/ Atorvastatin.   urinary burning / frequency ua c/w with uti will start antibx f/u urine cx  7/26/2022 GNR in urine f/u cx data extend antibx as needed    DVTp: heparin   Disposition:  pending [facility]IA acceptance     Rob Winter [phone]

## 2022-07-27 LAB
-  AMIKACIN: SIGNIFICANT CHANGE UP
-  AMOXICILLIN/CLAVULANIC ACID: SIGNIFICANT CHANGE UP
-  AMPICILLIN/SULBACTAM: SIGNIFICANT CHANGE UP
-  AMPICILLIN: SIGNIFICANT CHANGE UP
-  AZTREONAM: SIGNIFICANT CHANGE UP
-  CEFAZOLIN: SIGNIFICANT CHANGE UP
-  CEFEPIME: SIGNIFICANT CHANGE UP
-  CEFOXITIN: SIGNIFICANT CHANGE UP
-  CEFTRIAXONE: SIGNIFICANT CHANGE UP
-  CIPROFLOXACIN: SIGNIFICANT CHANGE UP
-  ERTAPENEM: SIGNIFICANT CHANGE UP
-  GENTAMICIN: SIGNIFICANT CHANGE UP
-  IMIPENEM: SIGNIFICANT CHANGE UP
-  LEVOFLOXACIN: SIGNIFICANT CHANGE UP
-  MEROPENEM: SIGNIFICANT CHANGE UP
-  NITROFURANTOIN: SIGNIFICANT CHANGE UP
-  PIPERACILLIN/TAZOBACTAM: SIGNIFICANT CHANGE UP
-  TIGECYCLINE: SIGNIFICANT CHANGE UP
-  TOBRAMYCIN: SIGNIFICANT CHANGE UP
-  TRIMETHOPRIM/SULFAMETHOXAZOLE: SIGNIFICANT CHANGE UP
ANION GAP SERPL CALC-SCNC: 8 MMOL/L — SIGNIFICANT CHANGE UP (ref 5–17)
BUN SERPL-MCNC: 12 MG/DL — SIGNIFICANT CHANGE UP (ref 7–23)
CALCIUM SERPL-MCNC: 9.9 MG/DL — SIGNIFICANT CHANGE UP (ref 8.5–10.1)
CHLORIDE SERPL-SCNC: 104 MMOL/L — SIGNIFICANT CHANGE UP (ref 96–108)
CO2 SERPL-SCNC: 24 MMOL/L — SIGNIFICANT CHANGE UP (ref 22–31)
CREAT SERPL-MCNC: 1.03 MG/DL — SIGNIFICANT CHANGE UP (ref 0.5–1.3)
CULTURE RESULTS: SIGNIFICANT CHANGE UP
EGFR: 60 ML/MIN/1.73M2 — SIGNIFICANT CHANGE UP
GLUCOSE BLDC GLUCOMTR-MCNC: 108 MG/DL — HIGH (ref 70–99)
GLUCOSE BLDC GLUCOMTR-MCNC: 186 MG/DL — HIGH (ref 70–99)
GLUCOSE BLDC GLUCOMTR-MCNC: 229 MG/DL — HIGH (ref 70–99)
GLUCOSE BLDC GLUCOMTR-MCNC: 290 MG/DL — HIGH (ref 70–99)
GLUCOSE BLDC GLUCOMTR-MCNC: 293 MG/DL — HIGH (ref 70–99)
GLUCOSE SERPL-MCNC: 197 MG/DL — HIGH (ref 70–99)
HCT VFR BLD CALC: 35.6 % — SIGNIFICANT CHANGE UP (ref 34.5–45)
HGB BLD-MCNC: 11.5 G/DL — SIGNIFICANT CHANGE UP (ref 11.5–15.5)
MCHC RBC-ENTMCNC: 28.8 PG — SIGNIFICANT CHANGE UP (ref 27–34)
MCHC RBC-ENTMCNC: 32.3 G/DL — SIGNIFICANT CHANGE UP (ref 32–36)
MCV RBC AUTO: 89.2 FL — SIGNIFICANT CHANGE UP (ref 80–100)
METHOD TYPE: SIGNIFICANT CHANGE UP
NRBC # BLD: 0 /100 WBCS — SIGNIFICANT CHANGE UP (ref 0–0)
ORGANISM # SPEC MICROSCOPIC CNT: SIGNIFICANT CHANGE UP
ORGANISM # SPEC MICROSCOPIC CNT: SIGNIFICANT CHANGE UP
PLATELET # BLD AUTO: 551 K/UL — HIGH (ref 150–400)
POTASSIUM SERPL-MCNC: 4.2 MMOL/L — SIGNIFICANT CHANGE UP (ref 3.5–5.3)
POTASSIUM SERPL-SCNC: 4.2 MMOL/L — SIGNIFICANT CHANGE UP (ref 3.5–5.3)
RBC # BLD: 3.99 M/UL — SIGNIFICANT CHANGE UP (ref 3.8–5.2)
RBC # FLD: 14 % — SIGNIFICANT CHANGE UP (ref 10.3–14.5)
SODIUM SERPL-SCNC: 136 MMOL/L — SIGNIFICANT CHANGE UP (ref 135–145)
SPECIMEN SOURCE: SIGNIFICANT CHANGE UP
WBC # BLD: 13.31 K/UL — HIGH (ref 3.8–10.5)
WBC # FLD AUTO: 13.31 K/UL — HIGH (ref 3.8–10.5)

## 2022-07-27 PROCEDURE — 99232 SBSQ HOSP IP/OBS MODERATE 35: CPT

## 2022-07-27 RX ORDER — LACTULOSE 10 G/15ML
20 SOLUTION ORAL ONCE
Refills: 0 | Status: COMPLETED | OUTPATIENT
Start: 2022-07-27 | End: 2022-07-27

## 2022-07-27 RX ADMIN — HEPARIN SODIUM 5000 UNIT(S): 5000 INJECTION INTRAVENOUS; SUBCUTANEOUS at 21:39

## 2022-07-27 RX ADMIN — CYCLOBENZAPRINE HYDROCHLORIDE 10 MILLIGRAM(S): 10 TABLET, FILM COATED ORAL at 05:39

## 2022-07-27 RX ADMIN — Medication 50 MILLIGRAM(S): at 05:40

## 2022-07-27 RX ADMIN — CYCLOBENZAPRINE HYDROCHLORIDE 10 MILLIGRAM(S): 10 TABLET, FILM COATED ORAL at 12:21

## 2022-07-27 RX ADMIN — POLYETHYLENE GLYCOL 3350 17 GRAM(S): 17 POWDER, FOR SOLUTION ORAL at 12:20

## 2022-07-27 RX ADMIN — PANTOPRAZOLE SODIUM 40 MILLIGRAM(S): 20 TABLET, DELAYED RELEASE ORAL at 06:06

## 2022-07-27 RX ADMIN — METFORMIN HYDROCHLORIDE 500 MILLIGRAM(S): 850 TABLET ORAL at 17:58

## 2022-07-27 RX ADMIN — Medication 10 UNIT(S): at 07:56

## 2022-07-27 RX ADMIN — Medication 6: at 12:22

## 2022-07-27 RX ADMIN — Medication 650 MILLIGRAM(S): at 12:21

## 2022-07-27 RX ADMIN — Medication 325 MILLIGRAM(S): at 05:39

## 2022-07-27 RX ADMIN — Medication 2: at 07:56

## 2022-07-27 RX ADMIN — Medication 650 MILLIGRAM(S): at 13:00

## 2022-07-27 RX ADMIN — ATORVASTATIN CALCIUM 10 MILLIGRAM(S): 80 TABLET, FILM COATED ORAL at 12:20

## 2022-07-27 RX ADMIN — HEPARIN SODIUM 5000 UNIT(S): 5000 INJECTION INTRAVENOUS; SUBCUTANEOUS at 06:43

## 2022-07-27 RX ADMIN — Medication 325 MILLIGRAM(S): at 17:58

## 2022-07-27 RX ADMIN — Medication 1 APPLICATION(S): at 05:41

## 2022-07-27 RX ADMIN — CYCLOBENZAPRINE HYDROCHLORIDE 10 MILLIGRAM(S): 10 TABLET, FILM COATED ORAL at 21:39

## 2022-07-27 RX ADMIN — Medication 1: at 21:43

## 2022-07-27 RX ADMIN — CEFTRIAXONE 100 MILLIGRAM(S): 500 INJECTION, POWDER, FOR SOLUTION INTRAMUSCULAR; INTRAVENOUS at 10:01

## 2022-07-27 RX ADMIN — HEPARIN SODIUM 5000 UNIT(S): 5000 INJECTION INTRAVENOUS; SUBCUTANEOUS at 14:38

## 2022-07-27 RX ADMIN — Medication 1 APPLICATION(S): at 17:52

## 2022-07-27 RX ADMIN — METFORMIN HYDROCHLORIDE 500 MILLIGRAM(S): 850 TABLET ORAL at 05:40

## 2022-07-27 RX ADMIN — INSULIN GLARGINE 30 UNIT(S): 100 INJECTION, SOLUTION SUBCUTANEOUS at 21:44

## 2022-07-27 RX ADMIN — LACTULOSE 20 GRAM(S): 10 SOLUTION ORAL at 21:39

## 2022-07-27 RX ADMIN — MONTELUKAST 10 MILLIGRAM(S): 4 TABLET, CHEWABLE ORAL at 12:20

## 2022-07-27 RX ADMIN — Medication 10 UNIT(S): at 12:23

## 2022-07-27 RX ADMIN — SENNA PLUS 2 TABLET(S): 8.6 TABLET ORAL at 21:39

## 2022-07-27 NOTE — PROGRESS NOTE ADULT - SUBJECTIVE AND OBJECTIVE BOX
Patient is a 66y old  Female who presents with a chief complaint of Left leg/thigh pain. (27 Jul 2022 16:25)      Interval History: decreasinf blood glucose and now mainly in high 100s   on Lantus 30 units and prandial lispro 10 units and Metformin increased to 500 mg BID   Nutrition better     MEDICATIONS  (STANDING):  atorvastatin Oral Tab/Cap - Peds 10 milliGRAM(s) Oral daily  cefTRIAXone   IVPB      cefTRIAXone   IVPB 1000 milliGRAM(s) IV Intermittent every 24 hours  cyclobenzaprine 10 milliGRAM(s) Oral every 8 hours  dextrose 5%. 1000 milliLiter(s) (50 mL/Hr) IV Continuous <Continuous>  dextrose 5%. 1000 milliLiter(s) (100 mL/Hr) IV Continuous <Continuous>  dextrose 5%. 1000 milliLiter(s) (50 mL/Hr) IV Continuous <Continuous>  dextrose 50% Injectable 25 Gram(s) IV Push once  dextrose 50% Injectable 25 Gram(s) IV Push once  dextrose 50% Injectable 12.5 Gram(s) IV Push once  dextrose 50% Injectable 25 Gram(s) IV Push once  ferrous    sulfate 325 milliGRAM(s) Oral two times a day  glucagon  Injectable 1 milliGRAM(s) IntraMuscular once  glucagon  Injectable 1 milliGRAM(s) IntraMuscular once  heparin   Injectable 5000 Unit(s) SubCutaneous every 8 hours  insulin glargine Injectable (LANTUS) 30 Unit(s) SubCutaneous at bedtime  insulin lispro (ADMELOG) corrective regimen sliding scale   SubCutaneous three times a day before meals  insulin lispro (ADMELOG) corrective regimen sliding scale   SubCutaneous at bedtime  insulin lispro Injectable (ADMELOG) 10 Unit(s) SubCutaneous three times a day before meals  lactulose Syrup 20 Gram(s) Oral once  metFORMIN 500 milliGRAM(s) Oral two times a day  metoprolol succinate ER 50 milliGRAM(s) Oral daily  montelukast 10 milliGRAM(s) Oral daily  naloxone Injectable 0.4 milliGRAM(s) IV Push once  pantoprazole    Tablet 40 milliGRAM(s) Oral before breakfast  polyethylene glycol 3350 17 Gram(s) Oral daily  senna 2 Tablet(s) Oral at bedtime  vitamin A &amp; D Ointment 1 Application(s) Topical two times a day    MEDICATIONS  (PRN):  acetaminophen     Tablet .. 650 milliGRAM(s) Oral every 6 hours PRN Temp greater or equal to 38C (100.4F), Mild Pain (1 - 3)  benzocaine 15 mG/menthol 3.6 mG Lozenge 1 Lozenge Oral three times a day PRN Sore Throat  bisacodyl 5 milliGRAM(s) Oral every 12 hours PRN Constipation  dextrose Oral Gel 15 Gram(s) Oral once PRN Blood Glucose LESS THAN 70 milliGRAM(s)/deciliter  dextrose Oral Gel 15 Gram(s) Oral once PRN Blood Glucose LESS THAN 70 milliGRAM(s)/deciliter  magnesium hydroxide Suspension 30 milliLiter(s) Oral every 12 hours PRN Constipation  melatonin 3 milliGRAM(s) Oral at bedtime PRN Insomnia  ondansetron Injectable 4 milliGRAM(s) IV Push every 6 hours PRN Nausea and/or Vomiting      Allergies    No Known Allergies    Intolerances        REVIEW OF SYSTEMS:  CONSTITUTIONAL: no changes  EYES: No eye pain, visual disturbances, or discharge  ENMT:  No difficulty hearing, No sinus or throat pain  NECK: No pain or stiffness  RESPIRATORY: No cough, wheezing, chills or hemoptysis; No shortness of breath  CARDIOVASCULAR: No chest pain, palpitations or leg swelling  GASTROINTESTINAL: No abdominal or epigastric pain. No nausea, vomiting, or hematemesis; No diarrhea or constipation. No melena or hematochezia.  GENITOURINARY: No dysuria, frequency, hematuria, or incontinence  NEUROLOGICAL: No headaches, memory loss, loss of strength, numbness, or tremors  SKIN: No itching, burning, rashes, or lesions   ENDOCRINE: No heat or cold intolerance; No hair loss  MUSCULOSKELETAL: No joint pain or swelling; No muscle, back, or extremity pain  PSYCHIATRIC: No depression, anxiety, mood swings, or difficulty sleeping  HEME/LYMPH: No easy bruising, or bleeding gums  ALLERY AND IMMUNOLOGIC: No hives or eczema    Vital Signs Last 24 Hrs  T(C): 36.6 (27 Jul 2022 16:38), Max: 36.9 (27 Jul 2022 00:03)  T(F): 97.8 (27 Jul 2022 16:38), Max: 98.4 (27 Jul 2022 00:03)  HR: 80 (27 Jul 2022 16:38) (80 - 93)  BP: 125/73 (27 Jul 2022 16:38) (125/73 - 151/80)  BP(mean): --  RR: 18 (27 Jul 2022 16:38) (18 - 19)  SpO2: 100% (27 Jul 2022 16:38) (95% - 100%)    Parameters below as of 27 Jul 2022 12:15  Patient On (Oxygen Delivery Method): room air        PHYSICAL EXAM:  GENERAL:   HEAD: Atraumatic, Normocephalic  EYES: PERRLA, conjunctiva and sclera clear  ENMT: No  exudates,; Moist mucous membranes,, No lesions  NECK: Supple, No JVD, Normal thyroid  NERVOUS SYSTEM:  Alert & Oriented,   CHEST/LUNG: Clear to auscultation bilaterally; No rales, rhonchi, wheezing, or rubs  HEART: Regular rate and rhythm; No murmurs, rubs, or gallops  ABDOMEN: Soft, Nontender, Nondistended; Bowel sounds present  EXTREMITIES:  2+ Peripheral Pulses, no edema  SKIN: No rashes or lesions    LABS:        CAPILLARY BLOOD GLUCOSE      POCT Blood Glucose.: 108 mg/dL (27 Jul 2022 15:19)  POCT Blood Glucose.: 293 mg/dL (27 Jul 2022 12:19)  POCT Blood Glucose.: 229 mg/dL (27 Jul 2022 10:59)  POCT Blood Glucose.: 186 mg/dL (27 Jul 2022 07:26)  POCT Blood Glucose.: 180 mg/dL (26 Jul 2022 21:41)    Lipid panel:           Thyroid:  Diabetes Tests:  Parathyroid Panel:  Adrenals:  RADIOLOGY & ADDITIONAL TESTS:    Imaging Personally Reviewed:  [ ] YES  [ ] NO    Consultant(s) Notes Reviewed:  [ ] YES  [ ] NO    Care Discussed with Consultants/Other Providers [ ] YES  [ ] NO

## 2022-07-27 NOTE — PROGRESS NOTE ADULT - SUBJECTIVE AND OBJECTIVE BOX
Patient is a 66y old  Female who presents with a chief complaint of Left leg/thigh pain. (19 Jul 2022 05:54)        INTERVAL HPI/ OVERNIGHT EVENTS: Pt was seen and examined at bedside today, no complaints     MEDICATIONS  (STANDING):  atorvastatin Oral Tab/Cap - Peds 10 milliGRAM(s) Oral daily  cefTRIAXone   IVPB      cefTRIAXone   IVPB 1000 milliGRAM(s) IV Intermittent every 24 hours  cyclobenzaprine 10 milliGRAM(s) Oral every 8 hours  dextrose 5%. 1000 milliLiter(s) (100 mL/Hr) IV Continuous <Continuous>  dextrose 5%. 1000 milliLiter(s) (50 mL/Hr) IV Continuous <Continuous>  dextrose 5%. 1000 milliLiter(s) (50 mL/Hr) IV Continuous <Continuous>  dextrose 50% Injectable 25 Gram(s) IV Push once  dextrose 50% Injectable 25 Gram(s) IV Push once  dextrose 50% Injectable 12.5 Gram(s) IV Push once  dextrose 50% Injectable 25 Gram(s) IV Push once  ferrous    sulfate 325 milliGRAM(s) Oral two times a day  glucagon  Injectable 1 milliGRAM(s) IntraMuscular once  glucagon  Injectable 1 milliGRAM(s) IntraMuscular once  heparin   Injectable 5000 Unit(s) SubCutaneous every 8 hours  insulin glargine Injectable (LANTUS) 30 Unit(s) SubCutaneous at bedtime  insulin lispro (ADMELOG) corrective regimen sliding scale   SubCutaneous three times a day before meals  insulin lispro (ADMELOG) corrective regimen sliding scale   SubCutaneous at bedtime  insulin lispro Injectable (ADMELOG) 10 Unit(s) SubCutaneous three times a day before meals  metFORMIN 500 milliGRAM(s) Oral two times a day  metoprolol succinate ER 50 milliGRAM(s) Oral daily  montelukast 10 milliGRAM(s) Oral daily  naloxone Injectable 0.4 milliGRAM(s) IV Push once  pantoprazole    Tablet 40 milliGRAM(s) Oral before breakfast  polyethylene glycol 3350 17 Gram(s) Oral daily  senna 2 Tablet(s) Oral at bedtime  vitamin A &amp; D Ointment 1 Application(s) Topical two times a day    MEDICATIONS  (PRN):  acetaminophen     Tablet .. 650 milliGRAM(s) Oral every 6 hours PRN Temp greater or equal to 38C (100.4F), Mild Pain (1 - 3)  benzocaine 15 mG/menthol 3.6 mG Lozenge 1 Lozenge Oral three times a day PRN Sore Throat  bisacodyl 5 milliGRAM(s) Oral every 12 hours PRN Constipation  dextrose Oral Gel 15 Gram(s) Oral once PRN Blood Glucose LESS THAN 70 milliGRAM(s)/deciliter  dextrose Oral Gel 15 Gram(s) Oral once PRN Blood Glucose LESS THAN 70 milliGRAM(s)/deciliter  magnesium hydroxide Suspension 30 milliLiter(s) Oral every 12 hours PRN Constipation  melatonin 3 milliGRAM(s) Oral at bedtime PRN Insomnia  ondansetron Injectable 4 milliGRAM(s) IV Push every 6 hours PRN Nausea and/or Vomiting    Allergies    No Known Allergies    Intolerances    Vital Signs Last 24 Hrs  T(C): 36.6 (27 Jul 2022 11:28), Max: 36.9 (27 Jul 2022 00:03)  T(F): 97.9 (27 Jul 2022 11:28), Max: 98.4 (27 Jul 2022 00:03)  HR: 93 (27 Jul 2022 12:15) (84 - 93)  BP: 151/80 (27 Jul 2022 12:15) (138/75 - 151/80)  BP(mean): --  RR: 18 (27 Jul 2022 12:15) (18 - 19)  SpO2: 95% (27 Jul 2022 12:15) (95% - 100%)    Parameters below as of 27 Jul 2022 12:15  Patient On (Oxygen Delivery Method): room air            PHYSICAL EXAM:  GENERAL: NAD on RA,  well-developed, well-groomed  HEAD:  Atraumatic, Normocephalic  EYES: conjunctiva and sclera clear  ENMT: Moist mucous membranes  NECK: Supple,   CHEST/LUNG: Clear to Auscultation bilaterally; No rales, rhonchi, wheezing, or rubs  HEART: Regular rate and rhythm; No murmurs, rubs, or gallops  ABDOMEN: Soft, Nontender, Nondistended; Bowel sounds present  EXTREMITIES: 2+ Peripheral Pulses, No clubbing, cyanosis, or edema  SKIN: Lumbar surgical wound   NERVOUS SYSTEM:  Alert & Oriented X3, Good concentration; Motor Strength 4/5 B/L upper extremities, Lower extremity strength unable to be assess due to severe pain     LABS:                                                           11.5   13.31 )-----------( 551      ( 27 Jul 2022 07:45 )             35.6     07-27    136  |  104  |  12  ----------------------------<  197<H>  4.2   |  24  |  1.03    Ca    9.9      27 Jul 2022 07:45              Culture - Urine (collected 07-12-22)  Source: Clean Catch Clean Catch (Midstream)  Final Report (07-14-22):    <10,000 CFU/mL Normal Urogenital Mireille  Urinalysis + Microscopic Examination (07.24.22 @ 15:38)    Urine Appearance: Clear    Specific Gravity: 1.005    Leukocyte Esterase Concentration: Trace    Ketone - Urine: Negative    pH Urine: 6.5    Blood, Urine: Negative    Urobilinogen: Negative mg/dL    Color: Yellow    Protein, Urine: 15 mg/dL    Nitrite: Negative    Bilirubin: Negative    Glucose Qualitative, Urine: Negative mg/dL    Red Blood Cell - Urine: 0-2 /HPF    White Blood Cell - Urine: 6-10    Epithelial Cells: Occasional    Bacteria: Occasional          RADIOLOGY & ADDITIONAL TESTS:          Imaging Personally Reviewed:  [ ] YES  [ ] NO    Consultant(s) Notes Reviewed:  [ ] YES  [ ] NO    Care Discussed with Consultants/Other Providers [x ] YES  [ ] NO

## 2022-07-27 NOTE — PROGRESS NOTE ADULT - PROBLEM SELECTOR PLAN 1
improving  continue lantus 30units   lispro 10units with meals  continue ALESSIA with meals
Continue with the current  regimen while inpatient   Encourage more nutrition   while inpatient, finger sticks should be 100-180
Continue with the current  regimen while inpatient   add Metformin 500 mg QD   while inpatient, finger sticks should be 100-180
Increased glucose toxicity and possibly infection driven hyperglycemia.  HbA1c 7.4, indicating towards fair control of diabetes at home.  Last increased blood glucose points towards acute hyperglycemia.  Once creatinine is normal, introduce metformin.  Renal consult is suggested
Continue with the current  regimen while inpatient   addition of Metformin ( will need Nephrology clearance) will help decrease Insulin Resistance and get better control of blood glucose   while inpatient, finger sticks should be 100-180
Continue with the current  regimen while inpatient   stable finger sticks now   Encourage more nutrition
Continue with the current  regimen while inpatient   add Metformin 500 mg BID in a day or two   while inpatient, finger sticks should be 100-180 , patient with increased Insulin Resistance
Continue with the current  regimen while inpatient   increase Metformin to BID   while inpatient, finger sticks should be 100-180
controlled  continue lantus 30units   lispro 10units with meals  continue ALESSIA with meals
may need more Lantus and prandial lispro   while inpatient, finger sticks should be 100-180 and stable finger sticks   Continue with the current  regimen while inpatient

## 2022-07-27 NOTE — PROGRESS NOTE ADULT - ASSESSMENT
66F Spanish speaking PMHx of HTN, HLD, DM 2  presenting with left hip pain x 1-2 days. Described as mild, achy, shooting pain down left buttocks to knee. Exacerbated w/ standing and position. Pain level is 8/10 in intensity, not getting better after multiple doses of pain meds in ER. Otherwise, the patient denies any neck pain, headaches, chest pain, shortness of breath, n/v/d, abdominal pain, recent illness, fevers, chills, recent spinal/back surgeries or procedures, bowel or bladder incontinence, bowel or bladder retention, constipation, IV drug use, known cancer history, recent weight loss, trauma, weakness, other subjective neurological deficits, numbness/tingling, dysuria, hematuria, rashes. Ct Abd/Pelvis and CT Lumbar Spine :Mild motion artifact in the abdomen. No evidence of acute abdominal or pelvic abnormality. No evidence of acute lumbar spine abnormality. Mild degenerative changes of the lower lumbar spine. US negative for DVT.     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     s/p  L4-5 decompression, TLIF  -cont w/ analgesics prn   -cont w/  lumosacral orthosis brace; MAIA on discharge       Acute Toxic Metabolic Encephalopathy   per my colleague's documentation was presumed secondary to opioid  Head CT: no acute changes    cont to monitor mentation with any  opioid use     DM II   HgA1c: 8.4% uncontrolled   cont w/  lantus and prandial insulin   cont w/ FS monitoring w/ insulin s/s coverage for now   7/20/2022 FS still elevated will d/w endocrine    CHRIS-on Stage 2 chronic kidney disease  avoid nephrotoxic medications, renal dose meds   cont to monitor renal function as needed    7/20/2022 at baseline  7/21/2022 voiding per nurse patton was removed     Hypertension   cont w/ Metoprolol.  7/23/2022 consider increase metoprolol  7/24/2022 will increase toprol xl    Dyslipidemia.   cont w/ Atorvastatin.       urinary burning / frequency urine cx growing klebsiella sens to ceftriaxone,       DVTp: heparin   Disposition:  pending MAIA acceptance     Son Moy [phone]

## 2022-07-27 NOTE — PROGRESS NOTE ADULT - PROBLEM SELECTOR PROBLEM 1
Poorly controlled diabetes mellitus
Uncontrolled type 2 DM with hyperosmolar nonketotic hyperglycemia
Poorly controlled diabetes mellitus
Poorly controlled diabetes mellitus
Uncontrolled type 2 DM with hyperosmolar nonketotic hyperglycemia
Poorly controlled diabetes mellitus

## 2022-07-28 LAB
ALBUMIN SERPL ELPH-MCNC: 2.6 G/DL — LOW (ref 3.3–5)
ALP SERPL-CCNC: 88 U/L — SIGNIFICANT CHANGE UP (ref 40–120)
ALT FLD-CCNC: 55 U/L — SIGNIFICANT CHANGE UP (ref 12–78)
ANION GAP SERPL CALC-SCNC: 7 MMOL/L — SIGNIFICANT CHANGE UP (ref 5–17)
AST SERPL-CCNC: 30 U/L — SIGNIFICANT CHANGE UP (ref 15–37)
BILIRUB SERPL-MCNC: 0.4 MG/DL — SIGNIFICANT CHANGE UP (ref 0.2–1.2)
BUN SERPL-MCNC: 12 MG/DL — SIGNIFICANT CHANGE UP (ref 7–23)
CALCIUM SERPL-MCNC: 10.3 MG/DL — HIGH (ref 8.5–10.1)
CHLORIDE SERPL-SCNC: 100 MMOL/L — SIGNIFICANT CHANGE UP (ref 96–108)
CO2 SERPL-SCNC: 26 MMOL/L — SIGNIFICANT CHANGE UP (ref 22–31)
CREAT SERPL-MCNC: 1.24 MG/DL — SIGNIFICANT CHANGE UP (ref 0.5–1.3)
EGFR: 48 ML/MIN/1.73M2 — LOW
GLUCOSE BLDC GLUCOMTR-MCNC: 231 MG/DL — HIGH (ref 70–99)
GLUCOSE BLDC GLUCOMTR-MCNC: 237 MG/DL — HIGH (ref 70–99)
GLUCOSE BLDC GLUCOMTR-MCNC: 278 MG/DL — HIGH (ref 70–99)
GLUCOSE BLDC GLUCOMTR-MCNC: 306 MG/DL — HIGH (ref 70–99)
GLUCOSE SERPL-MCNC: 258 MG/DL — HIGH (ref 70–99)
HCT VFR BLD CALC: 36 % — SIGNIFICANT CHANGE UP (ref 34.5–45)
HGB BLD-MCNC: 11.9 G/DL — SIGNIFICANT CHANGE UP (ref 11.5–15.5)
MCHC RBC-ENTMCNC: 29.6 PG — SIGNIFICANT CHANGE UP (ref 27–34)
MCHC RBC-ENTMCNC: 33.1 G/DL — SIGNIFICANT CHANGE UP (ref 32–36)
MCV RBC AUTO: 89.6 FL — SIGNIFICANT CHANGE UP (ref 80–100)
NRBC # BLD: 0 /100 WBCS — SIGNIFICANT CHANGE UP (ref 0–0)
PLATELET # BLD AUTO: 580 K/UL — HIGH (ref 150–400)
POTASSIUM SERPL-MCNC: 4.2 MMOL/L — SIGNIFICANT CHANGE UP (ref 3.5–5.3)
POTASSIUM SERPL-SCNC: 4.2 MMOL/L — SIGNIFICANT CHANGE UP (ref 3.5–5.3)
PROT SERPL-MCNC: 8 GM/DL — SIGNIFICANT CHANGE UP (ref 6–8.3)
RBC # BLD: 4.02 M/UL — SIGNIFICANT CHANGE UP (ref 3.8–5.2)
RBC # FLD: 13.9 % — SIGNIFICANT CHANGE UP (ref 10.3–14.5)
SODIUM SERPL-SCNC: 133 MMOL/L — LOW (ref 135–145)
WBC # BLD: 12.45 K/UL — HIGH (ref 3.8–10.5)
WBC # FLD AUTO: 12.45 K/UL — HIGH (ref 3.8–10.5)

## 2022-07-28 PROCEDURE — 99232 SBSQ HOSP IP/OBS MODERATE 35: CPT

## 2022-07-28 RX ADMIN — Medication 10 UNIT(S): at 07:46

## 2022-07-28 RX ADMIN — CYCLOBENZAPRINE HYDROCHLORIDE 10 MILLIGRAM(S): 10 TABLET, FILM COATED ORAL at 21:34

## 2022-07-28 RX ADMIN — METFORMIN HYDROCHLORIDE 500 MILLIGRAM(S): 850 TABLET ORAL at 05:24

## 2022-07-28 RX ADMIN — CYCLOBENZAPRINE HYDROCHLORIDE 10 MILLIGRAM(S): 10 TABLET, FILM COATED ORAL at 13:28

## 2022-07-28 RX ADMIN — SENNA PLUS 2 TABLET(S): 8.6 TABLET ORAL at 21:34

## 2022-07-28 RX ADMIN — INSULIN GLARGINE 30 UNIT(S): 100 INJECTION, SOLUTION SUBCUTANEOUS at 21:35

## 2022-07-28 RX ADMIN — Medication 1 APPLICATION(S): at 06:15

## 2022-07-28 RX ADMIN — Medication 6: at 11:04

## 2022-07-28 RX ADMIN — ATORVASTATIN CALCIUM 10 MILLIGRAM(S): 80 TABLET, FILM COATED ORAL at 11:04

## 2022-07-28 RX ADMIN — MONTELUKAST 10 MILLIGRAM(S): 4 TABLET, CHEWABLE ORAL at 11:04

## 2022-07-28 RX ADMIN — Medication 325 MILLIGRAM(S): at 17:15

## 2022-07-28 RX ADMIN — Medication 50 MILLIGRAM(S): at 06:20

## 2022-07-28 RX ADMIN — HEPARIN SODIUM 5000 UNIT(S): 5000 INJECTION INTRAVENOUS; SUBCUTANEOUS at 21:34

## 2022-07-28 RX ADMIN — METFORMIN HYDROCHLORIDE 500 MILLIGRAM(S): 850 TABLET ORAL at 17:15

## 2022-07-28 RX ADMIN — POLYETHYLENE GLYCOL 3350 17 GRAM(S): 17 POWDER, FOR SOLUTION ORAL at 11:04

## 2022-07-28 RX ADMIN — HEPARIN SODIUM 5000 UNIT(S): 5000 INJECTION INTRAVENOUS; SUBCUTANEOUS at 05:27

## 2022-07-28 RX ADMIN — HEPARIN SODIUM 5000 UNIT(S): 5000 INJECTION INTRAVENOUS; SUBCUTANEOUS at 13:28

## 2022-07-28 RX ADMIN — Medication 4: at 07:45

## 2022-07-28 RX ADMIN — PANTOPRAZOLE SODIUM 40 MILLIGRAM(S): 20 TABLET, DELAYED RELEASE ORAL at 06:14

## 2022-07-28 RX ADMIN — CEFTRIAXONE 100 MILLIGRAM(S): 500 INJECTION, POWDER, FOR SOLUTION INTRAMUSCULAR; INTRAVENOUS at 10:34

## 2022-07-28 RX ADMIN — CYCLOBENZAPRINE HYDROCHLORIDE 10 MILLIGRAM(S): 10 TABLET, FILM COATED ORAL at 05:23

## 2022-07-28 RX ADMIN — Medication 10 UNIT(S): at 11:05

## 2022-07-28 RX ADMIN — Medication 325 MILLIGRAM(S): at 05:24

## 2022-07-28 RX ADMIN — Medication 8: at 16:32

## 2022-07-28 RX ADMIN — Medication 1 APPLICATION(S): at 17:16

## 2022-07-28 RX ADMIN — Medication 10 UNIT(S): at 16:32

## 2022-07-28 NOTE — PROGRESS NOTE ADULT - ASSESSMENT
66F Telugu speaking PMHx of HTN, HLD, DM 2  presenting with left hip pain x 1-2 days. Described as mild, achy, shooting pain down left buttocks to knee. Exacerbated w/ standing and position. Pain level is 8/10 in intensity, not getting better after multiple doses of pain meds in ER. Otherwise, the patient denies any neck pain, headaches, chest pain, shortness of breath, n/v/d, abdominal pain, recent illness, fevers, chills, recent spinal/back surgeries or procedures, bowel or bladder incontinence, bowel or bladder retention, constipation, IV drug use, known cancer history, recent weight loss, trauma, weakness, other subjective neurological deficits, numbness/tingling, dysuria, hematuria, rashes. Ct Abd/Pelvis and CT Lumbar Spine :Mild motion artifact in the abdomen. No evidence of acute abdominal or pelvic abnormality. No evidence of acute lumbar spine abnormality. Mild degenerative changes of the lower lumbar spine. US negative for DVT.     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     s/p  L4-5 decompression, TLIF  -cont w/ analgesics prn   -cont w/  lumosacral orthosis brace; MAIA on discharge       Acute Toxic Metabolic Encephalopathy   per my colleague's documentation was presumed secondary to opioid  Head CT: no acute changes    resolved     DM II   HgA1c: 8.4% uncontrolled   cont w/  lantus and prandial insulin   cont w/ FS monitoring w/ insulin s/s coverage for now   7/20/2022 FS still elevated will d/w endocrine    CHRIS-on Stage 2 chronic kidney disease  avoid nephrotoxic medications, renal dose meds   cont to monitor renal function as needed     Hypertension   cont w/ Metoprolol.      Dyslipidemia.   cont w/ Atorvastatin.       urinary burning / frequency urine cx growing klebsiella sens to ceftriaxone, s/p tx       DVTp: heparin   Disposition:  pending MAIA acceptance     Son Moy [phone]

## 2022-07-28 NOTE — PROGRESS NOTE ADULT - SUBJECTIVE AND OBJECTIVE BOX
Patient is a 66y old  Female who presents with a chief complaint of Left leg/thigh pain. (19 Jul 2022 05:54)        INTERVAL HPI/ OVERNIGHT EVENTS: Pt was seen and examined at bedside today, no complaints. had 2 bms overnight and feels relieved     MEDICATIONS  (STANDING):  atorvastatin Oral Tab/Cap - Peds 10 milliGRAM(s) Oral daily  cyclobenzaprine 10 milliGRAM(s) Oral every 8 hours  dextrose 5%. 1000 milliLiter(s) (50 mL/Hr) IV Continuous <Continuous>  dextrose 5%. 1000 milliLiter(s) (100 mL/Hr) IV Continuous <Continuous>  dextrose 5%. 1000 milliLiter(s) (50 mL/Hr) IV Continuous <Continuous>  dextrose 50% Injectable 25 Gram(s) IV Push once  dextrose 50% Injectable 25 Gram(s) IV Push once  dextrose 50% Injectable 12.5 Gram(s) IV Push once  dextrose 50% Injectable 25 Gram(s) IV Push once  ferrous    sulfate 325 milliGRAM(s) Oral two times a day  glucagon  Injectable 1 milliGRAM(s) IntraMuscular once  glucagon  Injectable 1 milliGRAM(s) IntraMuscular once  heparin   Injectable 5000 Unit(s) SubCutaneous every 8 hours  insulin glargine Injectable (LANTUS) 30 Unit(s) SubCutaneous at bedtime  insulin lispro (ADMELOG) corrective regimen sliding scale   SubCutaneous three times a day before meals  insulin lispro (ADMELOG) corrective regimen sliding scale   SubCutaneous at bedtime  insulin lispro Injectable (ADMELOG) 10 Unit(s) SubCutaneous three times a day before meals  metFORMIN 500 milliGRAM(s) Oral two times a day  metoprolol succinate ER 50 milliGRAM(s) Oral daily  montelukast 10 milliGRAM(s) Oral daily  naloxone Injectable 0.4 milliGRAM(s) IV Push once  pantoprazole    Tablet 40 milliGRAM(s) Oral before breakfast  polyethylene glycol 3350 17 Gram(s) Oral daily  senna 2 Tablet(s) Oral at bedtime  vitamin A &amp; D Ointment 1 Application(s) Topical two times a day    MEDICATIONS  (PRN):  acetaminophen     Tablet .. 650 milliGRAM(s) Oral every 6 hours PRN Temp greater or equal to 38C (100.4F), Mild Pain (1 - 3)  benzocaine 15 mG/menthol 3.6 mG Lozenge 1 Lozenge Oral three times a day PRN Sore Throat  bisacodyl 5 milliGRAM(s) Oral every 12 hours PRN Constipation  dextrose Oral Gel 15 Gram(s) Oral once PRN Blood Glucose LESS THAN 70 milliGRAM(s)/deciliter  dextrose Oral Gel 15 Gram(s) Oral once PRN Blood Glucose LESS THAN 70 milliGRAM(s)/deciliter  magnesium hydroxide Suspension 30 milliLiter(s) Oral every 12 hours PRN Constipation  melatonin 3 milliGRAM(s) Oral at bedtime PRN Insomnia  ondansetron Injectable 4 milliGRAM(s) IV Push every 6 hours PRN Nausea and/or Vomiting    Allergies    No Known Allergies    Intolerances    Vital Signs Last 24 Hrs  T(C): 37 (28 Jul 2022 11:25), Max: 37 (28 Jul 2022 11:25)  T(F): 98.6 (28 Jul 2022 11:25), Max: 98.6 (28 Jul 2022 11:25)  HR: 86 (28 Jul 2022 11:25) (80 - 103)  BP: 144/75 (28 Jul 2022 11:25) (125/73 - 153/74)  BP(mean): --  RR: 18 (28 Jul 2022 11:25) (18 - 18)  SpO2: 96% (28 Jul 2022 11:25) (96% - 100%)              PHYSICAL EXAM:  GENERAL: NAD on RA,  well-developed, well-groomed  HEAD:  Atraumatic, Normocephalic  EYES: conjunctiva and sclera clear  ENMT: Moist mucous membranes  NECK: Supple,   CHEST/LUNG: Clear to Auscultation bilaterally; No rales, rhonchi, wheezing, or rubs  HEART: Regular rate and rhythm; No murmurs, rubs, or gallops  ABDOMEN: Soft, Nontender, Nondistended; Bowel sounds present  EXTREMITIES: 2+ Peripheral Pulses, No clubbing, cyanosis, or edema  SKIN: Lumbar surgical wound   NERVOUS SYSTEM:  Alert & Oriented X3, Good concentration; Motor Strength 4/5 B/L upper extremities, Lower extremity strength unable to be assess due to severe pain     LABS:                                                                      11.9   12.45 )-----------( 580      ( 28 Jul 2022 08:15 )             36.0     07-28    133<L>  |  100  |  12  ----------------------------<  258<H>  4.2   |  26  |  1.24    Ca    10.3<H>      28 Jul 2022 08:15    TPro  8.0  /  Alb  2.6<L>  /  TBili  0.4  /  DBili  x   /  AST  30  /  ALT  55  /  AlkPhos  88  07-28                  Culture - Urine (collected 07-12-22)  Source: Clean Catch Clean Catch (Midstream)  Final Report (07-14-22):    <10,000 CFU/mL Normal Urogenital Mireille  Urinalysis + Microscopic Examination (07.24.22 @ 15:38)    Urine Appearance: Clear    Specific Gravity: 1.005    Leukocyte Esterase Concentration: Trace    Ketone - Urine: Negative    pH Urine: 6.5    Blood, Urine: Negative    Urobilinogen: Negative mg/dL    Color: Yellow    Protein, Urine: 15 mg/dL    Nitrite: Negative    Bilirubin: Negative    Glucose Qualitative, Urine: Negative mg/dL    Red Blood Cell - Urine: 0-2 /HPF    White Blood Cell - Urine: 6-10    Epithelial Cells: Occasional    Bacteria: Occasional          RADIOLOGY & ADDITIONAL TESTS:          Imaging Personally Reviewed:  [ ] YES  [ ] NO    Consultant(s) Notes Reviewed:  [ ] YES  [ ] NO    Care Discussed with Consultants/Other Providers [x ] YES  [ ] NO

## 2022-07-29 ENCOUNTER — TRANSCRIPTION ENCOUNTER (OUTPATIENT)
Age: 67
End: 2022-07-29

## 2022-07-29 VITALS
DIASTOLIC BLOOD PRESSURE: 76 MMHG | OXYGEN SATURATION: 97 % | SYSTOLIC BLOOD PRESSURE: 146 MMHG | HEART RATE: 94 BPM | TEMPERATURE: 98 F | RESPIRATION RATE: 18 BRPM

## 2022-07-29 LAB
ANION GAP SERPL CALC-SCNC: 9 MMOL/L — SIGNIFICANT CHANGE UP (ref 5–17)
BUN SERPL-MCNC: 13 MG/DL — SIGNIFICANT CHANGE UP (ref 7–23)
CALCIUM SERPL-MCNC: 10.1 MG/DL — SIGNIFICANT CHANGE UP (ref 8.5–10.1)
CHLORIDE SERPL-SCNC: 101 MMOL/L — SIGNIFICANT CHANGE UP (ref 96–108)
CO2 SERPL-SCNC: 24 MMOL/L — SIGNIFICANT CHANGE UP (ref 22–31)
CREAT SERPL-MCNC: 1.11 MG/DL — SIGNIFICANT CHANGE UP (ref 0.5–1.3)
EGFR: 55 ML/MIN/1.73M2 — LOW
GLUCOSE BLDC GLUCOMTR-MCNC: 147 MG/DL — HIGH (ref 70–99)
GLUCOSE BLDC GLUCOMTR-MCNC: 188 MG/DL — HIGH (ref 70–99)
GLUCOSE BLDC GLUCOMTR-MCNC: 271 MG/DL — HIGH (ref 70–99)
GLUCOSE SERPL-MCNC: 219 MG/DL — HIGH (ref 70–99)
HCT VFR BLD CALC: 36.3 % — SIGNIFICANT CHANGE UP (ref 34.5–45)
HGB BLD-MCNC: 12 G/DL — SIGNIFICANT CHANGE UP (ref 11.5–15.5)
MCHC RBC-ENTMCNC: 29.3 PG — SIGNIFICANT CHANGE UP (ref 27–34)
MCHC RBC-ENTMCNC: 33.1 G/DL — SIGNIFICANT CHANGE UP (ref 32–36)
MCV RBC AUTO: 88.5 FL — SIGNIFICANT CHANGE UP (ref 80–100)
NRBC # BLD: 0 /100 WBCS — SIGNIFICANT CHANGE UP (ref 0–0)
PLATELET # BLD AUTO: 549 K/UL — HIGH (ref 150–400)
POTASSIUM SERPL-MCNC: 4 MMOL/L — SIGNIFICANT CHANGE UP (ref 3.5–5.3)
POTASSIUM SERPL-SCNC: 4 MMOL/L — SIGNIFICANT CHANGE UP (ref 3.5–5.3)
RBC # BLD: 4.1 M/UL — SIGNIFICANT CHANGE UP (ref 3.8–5.2)
RBC # FLD: 13.9 % — SIGNIFICANT CHANGE UP (ref 10.3–14.5)
SODIUM SERPL-SCNC: 134 MMOL/L — LOW (ref 135–145)
WBC # BLD: 13.26 K/UL — HIGH (ref 3.8–10.5)
WBC # FLD AUTO: 13.26 K/UL — HIGH (ref 3.8–10.5)

## 2022-07-29 PROCEDURE — 99239 HOSP IP/OBS DSCHRG MGMT >30: CPT

## 2022-07-29 RX ORDER — INSULIN LISPRO 100/ML
16 VIAL (ML) SUBCUTANEOUS
Qty: 0 | Refills: 0 | DISCHARGE

## 2022-07-29 RX ORDER — METFORMIN HYDROCHLORIDE 850 MG/1
1 TABLET ORAL
Qty: 0 | Refills: 0 | DISCHARGE

## 2022-07-29 RX ORDER — ENOXAPARIN SODIUM 100 MG/ML
40 INJECTION SUBCUTANEOUS
Qty: 0 | Refills: 0 | DISCHARGE

## 2022-07-29 RX ORDER — INSULIN GLARGINE 100 [IU]/ML
30 INJECTION, SOLUTION SUBCUTANEOUS
Qty: 0 | Refills: 0 | DISCHARGE
Start: 2022-07-29

## 2022-07-29 RX ORDER — ACETAMINOPHEN 500 MG
2 TABLET ORAL
Qty: 0 | Refills: 0 | DISCHARGE
Start: 2022-07-29

## 2022-07-29 RX ORDER — CYCLOBENZAPRINE HYDROCHLORIDE 10 MG/1
1 TABLET, FILM COATED ORAL
Qty: 0 | Refills: 0 | DISCHARGE
Start: 2022-07-29

## 2022-07-29 RX ORDER — INSULIN LISPRO 100/ML
10 VIAL (ML) SUBCUTANEOUS
Qty: 0 | Refills: 0 | DISCHARGE
Start: 2022-07-29

## 2022-07-29 RX ORDER — CYCLOBENZAPRINE HYDROCHLORIDE 10 MG/1
1 TABLET, FILM COATED ORAL
Qty: 12 | Refills: 0
Start: 2022-07-29 | End: 2022-08-01

## 2022-07-29 RX ORDER — SALICYLIC ACID 0.5 %
1 CLEANSER (GRAM) TOPICAL
Qty: 0 | Refills: 0 | DISCHARGE
Start: 2022-07-29

## 2022-07-29 RX ADMIN — Medication 10 UNIT(S): at 07:50

## 2022-07-29 RX ADMIN — POLYETHYLENE GLYCOL 3350 17 GRAM(S): 17 POWDER, FOR SOLUTION ORAL at 11:35

## 2022-07-29 RX ADMIN — CYCLOBENZAPRINE HYDROCHLORIDE 10 MILLIGRAM(S): 10 TABLET, FILM COATED ORAL at 13:02

## 2022-07-29 RX ADMIN — Medication 325 MILLIGRAM(S): at 17:47

## 2022-07-29 RX ADMIN — Medication 2: at 07:49

## 2022-07-29 RX ADMIN — HEPARIN SODIUM 5000 UNIT(S): 5000 INJECTION INTRAVENOUS; SUBCUTANEOUS at 05:07

## 2022-07-29 RX ADMIN — METFORMIN HYDROCHLORIDE 500 MILLIGRAM(S): 850 TABLET ORAL at 05:06

## 2022-07-29 RX ADMIN — HEPARIN SODIUM 5000 UNIT(S): 5000 INJECTION INTRAVENOUS; SUBCUTANEOUS at 13:02

## 2022-07-29 RX ADMIN — Medication 50 MILLIGRAM(S): at 05:06

## 2022-07-29 RX ADMIN — Medication 10 UNIT(S): at 11:33

## 2022-07-29 RX ADMIN — METFORMIN HYDROCHLORIDE 500 MILLIGRAM(S): 850 TABLET ORAL at 17:47

## 2022-07-29 RX ADMIN — ATORVASTATIN CALCIUM 10 MILLIGRAM(S): 80 TABLET, FILM COATED ORAL at 11:34

## 2022-07-29 RX ADMIN — Medication 1 APPLICATION(S): at 17:46

## 2022-07-29 RX ADMIN — Medication 10 UNIT(S): at 16:07

## 2022-07-29 RX ADMIN — MONTELUKAST 10 MILLIGRAM(S): 4 TABLET, CHEWABLE ORAL at 11:35

## 2022-07-29 RX ADMIN — Medication 325 MILLIGRAM(S): at 05:06

## 2022-07-29 RX ADMIN — Medication 1 APPLICATION(S): at 05:14

## 2022-07-29 RX ADMIN — CYCLOBENZAPRINE HYDROCHLORIDE 10 MILLIGRAM(S): 10 TABLET, FILM COATED ORAL at 05:06

## 2022-07-29 RX ADMIN — Medication 6: at 11:33

## 2022-07-29 RX ADMIN — PANTOPRAZOLE SODIUM 40 MILLIGRAM(S): 20 TABLET, DELAYED RELEASE ORAL at 07:50

## 2022-07-29 NOTE — PROGRESS NOTE ADULT - SUBJECTIVE AND OBJECTIVE BOX
Patient seen and examined at bedside this AM. Pain well controlled and pt reports doing well. Patient denies any numbness, tingling, weakness, or any other orthopaedic complaint. Denies N/V/CP/SOB. Ambulating well with walker.    LABS:                        11.9   12.45 )-----------( 580      ( 28 Jul 2022 08:15 )             36.0     07-28    133<L>  |  100  |  12  ----------------------------<  258<H>  4.2   |  26  |  1.24    Ca    10.3<H>      28 Jul 2022 08:15    TPro  8.0  /  Alb  2.6<L>  /  TBili  0.4  /  DBili  x   /  AST  30  /  ALT  55  /  AlkPhos  88  07-28          VITAL SIGNS:  T(C): 37 (07-29-22 @ 05:02), Max: 37 (07-28-22 @ 11:25)  HR: 80 (07-29-22 @ 05:02) (80 - 97)  BP: 126/69 (07-29-22 @ 05:02) (126/69 - 147/69)  RR: 16 (07-29-22 @ 05:02) (16 - 18)  SpO2: 98% (07-29-22 @ 05:02) (96% - 100%)      PE  General: awake, alert     Spine:  dressing CDI  + radial pulses  + DP Pulses    LE Motor:                     L2                  L3             L4              L5            S1  R            5/5                5/5             5/5            5/5          5/5  L             5/5                5/5            5/5            5/5          5/5    LE Sensory:               L2          L3         L4      L5       S1         (0=absent, 1=impaired, 2=normal, NT=not testable)  R         2            2            2        2        2  L          2            2           2        2         2        A/P:  66y F s/p L4-5 decompression, TLIF POD 15    -Patient passed TOV, 7/22, no further patton needed  -Medical comanagement and endo recs appreciated  -PT/OT   -WBAT  -Pain Control PRN  -DVT ppx - OK for SubQ heparin 5000U q8 hrs   -Encourage ambulation / SCDs  -FU AM Labs  -Incentive Spirometry  -Urology consulted, recs appreciated, f/u outpatient for further management and monitoring if neurogenic bladder returns  -Continue with Bowel Regimen   -Patient continues to do well, ambulating with significantly decreased pain/discomfort  -No further acute orthopaedic surgical intervention indicated. Orthopaedically stable for d/c w/ outpatient followup   -Dispo: MAIA; Pending authorization from insurance co for d/c  -Will Discuss with attending Dr. Christopher and advise if any changes to plan

## 2022-07-29 NOTE — DISCHARGE NOTE NURSING/CASE MANAGEMENT/SOCIAL WORK - PATIENT PORTAL LINK FT
You can access the FollowMyHealth Patient Portal offered by [facility] by registering at the following website: [email or web address]. By joining [facility]’s FollowMyHealth portal, you will also be able to view your health information using other applications (apps) compatible with our system.

## 2022-07-29 NOTE — PROGRESS NOTE ADULT - PROVIDER SPECIALTY LIST ADULT
Hospitalist
Endocrinology
Hospitalist
Orthopedics
Endocrinology
Endocrinology
Hospitalist
Orthopedics
Orthopedics
Hospitalist
Endocrinology

## 2022-07-29 NOTE — PROGRESS NOTE ADULT - ASSESSMENT
66F Chinese speaking PMHx of HTN, HLD, DM 2  presenting with left hip pain x 1-2 days. Described as mild, achy, shooting pain down left buttocks to knee. Exacerbated w/ standing and position. Pain level is 8/10 in intensity, not getting better after multiple doses of pain meds in ER. Otherwise, the patient denies any neck pain, headaches, chest pain, shortness of breath, n/v/d, abdominal pain, recent illness, fevers, chills, recent spinal/back surgeries or procedures, bowel or bladder incontinence, bowel or bladder retention, constipation, IV drug use, known cancer history, recent weight loss, trauma, weakness, other subjective neurological deficits, numbness/tingling, dysuria, hematuria, rashes. Ct Abd/Pelvis and CT Lumbar Spine :Mild motion artifact in the abdomen. No evidence of acute abdominal or pelvic abnormality. No evidence of acute lumbar spine abnormality. Mild degenerative changes of the lower lumbar spine. US negative for DVT.     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     s/p  L4-5 decompression, TLIF  -cont w/ analgesics prn   -cont w/  lumosacral orthosis brace;     Acute Toxic Metabolic Encephalopathy   per my colleague's documentation was presumed secondary to opioid  Head CT: no acute changes    resolved     DM II   HgA1c: 8.4% uncontrolled   cont w/  lantus and prandial insulin   cont w/ FS monitoring w/ insulin s/s coverage for now   7/20/2022 FS still elevated will d/w endocrine    CHRIS-on Stage 2 chronic kidney disease  avoid nephrotoxic medications, renal dose meds   cont to monitor renal function as needed     Hypertension   cont w/ Metoprolol.      Dyslipidemia.   cont w/ Atorvastatin.       urinary burning / frequency urine cx growing klebsiella sens to ceftriaxone, s/p tx       DVTp: heparin   Disposition:  pt wants to be dc home now. stable for dc with home pt     Son Moy [phone]

## 2022-07-29 NOTE — PROGRESS NOTE ADULT - SUBJECTIVE AND OBJECTIVE BOX
Patient is a 66y old  Female who presents with a chief complaint of Left leg/thigh pain. (19 Jul 2022 05:54)        INTERVAL HPI/ OVERNIGHT EVENTS: Pt was seen and examined at bedside today, no complaints.     MEDICATIONS  (STANDING):  atorvastatin Oral Tab/Cap - Peds 10 milliGRAM(s) Oral daily  cyclobenzaprine 10 milliGRAM(s) Oral every 8 hours  dextrose 5%. 1000 milliLiter(s) (50 mL/Hr) IV Continuous <Continuous>  dextrose 5%. 1000 milliLiter(s) (100 mL/Hr) IV Continuous <Continuous>  dextrose 5%. 1000 milliLiter(s) (50 mL/Hr) IV Continuous <Continuous>  dextrose 50% Injectable 25 Gram(s) IV Push once  dextrose 50% Injectable 25 Gram(s) IV Push once  dextrose 50% Injectable 12.5 Gram(s) IV Push once  dextrose 50% Injectable 25 Gram(s) IV Push once  ferrous    sulfate 325 milliGRAM(s) Oral two times a day  glucagon  Injectable 1 milliGRAM(s) IntraMuscular once  glucagon  Injectable 1 milliGRAM(s) IntraMuscular once  heparin   Injectable 5000 Unit(s) SubCutaneous every 8 hours  insulin glargine Injectable (LANTUS) 30 Unit(s) SubCutaneous at bedtime  insulin lispro (ADMELOG) corrective regimen sliding scale   SubCutaneous three times a day before meals  insulin lispro (ADMELOG) corrective regimen sliding scale   SubCutaneous at bedtime  insulin lispro Injectable (ADMELOG) 10 Unit(s) SubCutaneous three times a day before meals  metFORMIN 500 milliGRAM(s) Oral two times a day  metoprolol succinate ER 50 milliGRAM(s) Oral daily  montelukast 10 milliGRAM(s) Oral daily  naloxone Injectable 0.4 milliGRAM(s) IV Push once  pantoprazole    Tablet 40 milliGRAM(s) Oral before breakfast  polyethylene glycol 3350 17 Gram(s) Oral daily  senna 2 Tablet(s) Oral at bedtime  vitamin A &amp; D Ointment 1 Application(s) Topical two times a day    MEDICATIONS  (PRN):  acetaminophen     Tablet .. 650 milliGRAM(s) Oral every 6 hours PRN Temp greater or equal to 38C (100.4F), Mild Pain (1 - 3)  benzocaine 15 mG/menthol 3.6 mG Lozenge 1 Lozenge Oral three times a day PRN Sore Throat  bisacodyl 5 milliGRAM(s) Oral every 12 hours PRN Constipation  dextrose Oral Gel 15 Gram(s) Oral once PRN Blood Glucose LESS THAN 70 milliGRAM(s)/deciliter  dextrose Oral Gel 15 Gram(s) Oral once PRN Blood Glucose LESS THAN 70 milliGRAM(s)/deciliter  magnesium hydroxide Suspension 30 milliLiter(s) Oral every 12 hours PRN Constipation  melatonin 3 milliGRAM(s) Oral at bedtime PRN Insomnia  ondansetron Injectable 4 milliGRAM(s) IV Push every 6 hours PRN Nausea and/or Vomiting    Allergies    No Known Allergies    Intolerances    Vital Signs Last 24 Hrs  T(C): 36.2 (29 Jul 2022 11:32), Max: 37 (29 Jul 2022 05:02)  T(F): 97.1 (29 Jul 2022 11:32), Max: 98.6 (29 Jul 2022 05:02)  HR: 90 (29 Jul 2022 15:52) (80 - 95)  BP: 155/77 (29 Jul 2022 15:52) (126/69 - 155/77)  BP(mean): --  RR: 18 (29 Jul 2022 15:52) (16 - 18)  SpO2: 98% (29 Jul 2022 15:52) (97% - 100%)    Parameters below as of 28 Jul 2022 19:15  Patient On (Oxygen Delivery Method): room air              PHYSICAL EXAM:  GENERAL: NAD on RA,  well-developed, well-groomed  HEAD:  Atraumatic, Normocephalic  EYES: conjunctiva and sclera clear  ENMT: Moist mucous membranes  NECK: Supple,   CHEST/LUNG: Clear to Auscultation bilaterally; No rales, rhonchi, wheezing, or rubs  HEART: Regular rate and rhythm; No murmurs, rubs, or gallops  ABDOMEN: Soft, Nontender, Nondistended; Bowel sounds present  EXTREMITIES: 2+ Peripheral Pulses, No clubbing, cyanosis, or edema  SKIN: Lumbar surgical wound   NERVOUS SYSTEM:  Alert & Oriented X3, Good concentration; Motor Strength 4/5 B/L upper extremities, Lower extremity strength unable to be assess due to severe pain     LABS:                                                                                 12.0   13.26 )-----------( 549      ( 29 Jul 2022 08:48 )             36.3     07-29    134<L>  |  101  |  13  ----------------------------<  219<H>  4.0   |  24  |  1.11    Ca    10.1      29 Jul 2022 08:48    TPro  8.0  /  Alb  2.6<L>  /  TBili  0.4  /  DBili  x   /  AST  30  /  ALT  55  /  AlkPhos  88  07-28                    Culture - Urine (collected 07-12-22)  Source: Clean Catch Clean Catch (Midstream)  Final Report (07-14-22):    <10,000 CFU/mL Normal Urogenital Mireille  Urinalysis + Microscopic Examination (07.24.22 @ 15:38)    Urine Appearance: Clear    Specific Gravity: 1.005    Leukocyte Esterase Concentration: Trace    Ketone - Urine: Negative    pH Urine: 6.5    Blood, Urine: Negative    Urobilinogen: Negative mg/dL    Color: Yellow    Protein, Urine: 15 mg/dL    Nitrite: Negative    Bilirubin: Negative    Glucose Qualitative, Urine: Negative mg/dL    Red Blood Cell - Urine: 0-2 /HPF    White Blood Cell - Urine: 6-10    Epithelial Cells: Occasional    Bacteria: Occasional          RADIOLOGY & ADDITIONAL TESTS:          Imaging Personally Reviewed:  [ ] YES  [ ] NO    Consultant(s) Notes Reviewed:  [ ] YES  [ ] NO    Care Discussed with Consultants/Other Providers [x ] YES  [ ] NO

## 2022-07-29 NOTE — PROGRESS NOTE ADULT - REASON FOR ADMISSION
Left leg/thigh pain.

## 2022-10-18 NOTE — ED PROVIDER NOTE - PROGRESS NOTE DETAILS
CT ap negative, CT lumbar showing multiple disc bulging in L1-S1 nerve root, likely sciatica of left leg. labs unremarkable other than mild CHRIS, prerenal? Neuro intact, no numbness/tingling, discussed with son at bedside. Agreed to stay for pain control as unable to take care of patient at home. No

## 2023-09-11 NOTE — ED ADULT TRIAGE NOTE - NS ED TRIAGE AVPU SCALE
I certify as stated above.
Alert-The patient is alert, awake and responds to voice. The patient is oriented to time, place, and person. The triage nurse is able to obtain subjective information.

## 2023-09-21 NOTE — OCCUPATIONAL THERAPY INITIAL EVALUATION ADULT - TRANSFER TRAINING, PT EVAL
She had an EGD in 2/2023 by Dr. Iverson.  She feels good.  She denies abdominal pain.    SHe is taking PPI daily with good response.  She denies N/V/dysphagia.  She denies LGI symptoms.  She denies LGI bleed or melena.  Per her aide, everything is improved Pt will perform transfers with rolling walker independently in 2 weeks

## 2023-11-16 NOTE — DIETITIAN INITIAL EVALUATION ADULT - DIET TYPE
Called patient for further follow up after e-advice from 11/15.  Patient reports onset of  lower back pain began after placing Cytotec prior to 11/13 EMB.  Describes pain as a constant dull ache. Patient denies menstrual cramping or UTI symptoms. Reports sporadic vaginal spotting. Reports dull back ache has remain unchanged since Monday. States \"feels like I'm in labor.\"   Patient not taking anything for her symptoms.   Informed patient that I will forward her concern to Dr. Pagan for further recommendations.   
Called the patient and instructed the patient on Dr. Pagan's recommendations.  Patient agrees with plan and verbalizes understanding  
She should try taking some Ibuprofen for this.  If this persists > 1 week to let us know.  Also to let us know if there is a fever > 100.5 or a foul odor or profuse bleeding.  Spotting is normal after the EMB
As medically feasible, advance diet as tolerated to Consistent Carbohydrate (no snacks), Low Sodium diet

## 2024-07-01 ENCOUNTER — EMERGENCY (EMERGENCY)
Facility: HOSPITAL | Age: 69
LOS: 0 days | Discharge: HOME | End: 2024-07-02
Attending: EMERGENCY MEDICINE
Payer: MEDICAID

## 2024-07-01 VITALS
RESPIRATION RATE: 19 BRPM | HEIGHT: 60 IN | HEART RATE: 101 BPM | OXYGEN SATURATION: 95 % | WEIGHT: 119.93 LBS | SYSTOLIC BLOOD PRESSURE: 148 MMHG | TEMPERATURE: 98 F | DIASTOLIC BLOOD PRESSURE: 75 MMHG

## 2024-07-01 DIAGNOSIS — R35.0 FREQUENCY OF MICTURITION: ICD-10-CM

## 2024-07-01 DIAGNOSIS — M79.89 OTHER SPECIFIED SOFT TISSUE DISORDERS: ICD-10-CM

## 2024-07-01 DIAGNOSIS — E78.5 HYPERLIPIDEMIA, UNSPECIFIED: ICD-10-CM

## 2024-07-01 DIAGNOSIS — G89.29 OTHER CHRONIC PAIN: ICD-10-CM

## 2024-07-01 DIAGNOSIS — E11.9 TYPE 2 DIABETES MELLITUS WITHOUT COMPLICATIONS: ICD-10-CM

## 2024-07-01 DIAGNOSIS — M79.604 PAIN IN RIGHT LEG: ICD-10-CM

## 2024-07-01 DIAGNOSIS — M79.18 MYALGIA, OTHER SITE: ICD-10-CM

## 2024-07-01 DIAGNOSIS — R07.89 OTHER CHEST PAIN: ICD-10-CM

## 2024-07-01 DIAGNOSIS — Z90.49 ACQUIRED ABSENCE OF OTHER SPECIFIED PARTS OF DIGESTIVE TRACT: Chronic | ICD-10-CM

## 2024-07-01 DIAGNOSIS — I11.0 HYPERTENSIVE HEART DISEASE WITH HEART FAILURE: ICD-10-CM

## 2024-07-01 DIAGNOSIS — R10.30 LOWER ABDOMINAL PAIN, UNSPECIFIED: ICD-10-CM

## 2024-07-01 DIAGNOSIS — R14.0 ABDOMINAL DISTENSION (GASEOUS): ICD-10-CM

## 2024-07-01 DIAGNOSIS — M54.9 DORSALGIA, UNSPECIFIED: ICD-10-CM

## 2024-07-01 DIAGNOSIS — M79.605 PAIN IN LEFT LEG: ICD-10-CM

## 2024-07-01 DIAGNOSIS — R10.2 PELVIC AND PERINEAL PAIN: ICD-10-CM

## 2024-07-01 LAB
ALBUMIN SERPL ELPH-MCNC: 3.3 G/DL — SIGNIFICANT CHANGE UP (ref 3.3–5)
ALP SERPL-CCNC: 44 U/L — SIGNIFICANT CHANGE UP (ref 40–120)
ALT FLD-CCNC: 30 U/L — SIGNIFICANT CHANGE UP (ref 12–78)
ANION GAP SERPL CALC-SCNC: 7 MMOL/L — SIGNIFICANT CHANGE UP (ref 5–17)
APPEARANCE UR: CLEAR — SIGNIFICANT CHANGE UP
AST SERPL-CCNC: 27 U/L — SIGNIFICANT CHANGE UP (ref 15–37)
BASOPHILS # BLD AUTO: 0.05 K/UL — SIGNIFICANT CHANGE UP (ref 0–0.2)
BASOPHILS NFR BLD AUTO: 0.5 % — SIGNIFICANT CHANGE UP (ref 0–2)
BILIRUB SERPL-MCNC: 0.8 MG/DL — SIGNIFICANT CHANGE UP (ref 0.2–1.2)
BILIRUB UR-MCNC: NEGATIVE — SIGNIFICANT CHANGE UP
BUN SERPL-MCNC: 15 MG/DL — SIGNIFICANT CHANGE UP (ref 7–23)
CALCIUM SERPL-MCNC: 9.6 MG/DL — SIGNIFICANT CHANGE UP (ref 8.5–10.1)
CHLORIDE SERPL-SCNC: 109 MMOL/L — HIGH (ref 96–108)
CK SERPL-CCNC: 181 U/L — SIGNIFICANT CHANGE UP (ref 26–192)
CO2 SERPL-SCNC: 21 MMOL/L — LOW (ref 22–31)
COLOR SPEC: YELLOW — SIGNIFICANT CHANGE UP
CREAT SERPL-MCNC: 1.54 MG/DL — HIGH (ref 0.5–1.3)
D DIMER BLD IA.RAPID-MCNC: <150 NG/ML DDU — SIGNIFICANT CHANGE UP
DIFF PNL FLD: NEGATIVE — SIGNIFICANT CHANGE UP
EGFR: 37 ML/MIN/1.73M2 — LOW
EOSINOPHIL # BLD AUTO: 0.43 K/UL — SIGNIFICANT CHANGE UP (ref 0–0.5)
EOSINOPHIL NFR BLD AUTO: 4 % — SIGNIFICANT CHANGE UP (ref 0–6)
GLUCOSE SERPL-MCNC: 217 MG/DL — HIGH (ref 70–99)
GLUCOSE UR QL: >=1000 MG/DL
HCT VFR BLD CALC: 39.4 % — SIGNIFICANT CHANGE UP (ref 34.5–45)
HGB BLD-MCNC: 13.6 G/DL — SIGNIFICANT CHANGE UP (ref 11.5–15.5)
IMM GRANULOCYTES NFR BLD AUTO: 0.5 % — SIGNIFICANT CHANGE UP (ref 0–0.9)
KETONES UR-MCNC: NEGATIVE MG/DL — SIGNIFICANT CHANGE UP
LEUKOCYTE ESTERASE UR-ACNC: NEGATIVE — SIGNIFICANT CHANGE UP
LIDOCAIN IGE QN: 55 U/L — SIGNIFICANT CHANGE UP (ref 13–75)
LYMPHOCYTES # BLD AUTO: 2.75 K/UL — SIGNIFICANT CHANGE UP (ref 1–3.3)
LYMPHOCYTES # BLD AUTO: 25.7 % — SIGNIFICANT CHANGE UP (ref 13–44)
MAGNESIUM SERPL-MCNC: 2.1 MG/DL — SIGNIFICANT CHANGE UP (ref 1.6–2.6)
MCHC RBC-ENTMCNC: 29.6 PG — SIGNIFICANT CHANGE UP (ref 27–34)
MCHC RBC-ENTMCNC: 34.5 G/DL — SIGNIFICANT CHANGE UP (ref 32–36)
MCV RBC AUTO: 85.7 FL — SIGNIFICANT CHANGE UP (ref 80–100)
MONOCYTES # BLD AUTO: 0.8 K/UL — SIGNIFICANT CHANGE UP (ref 0–0.9)
MONOCYTES NFR BLD AUTO: 7.5 % — SIGNIFICANT CHANGE UP (ref 2–14)
NEUTROPHILS # BLD AUTO: 6.61 K/UL — SIGNIFICANT CHANGE UP (ref 1.8–7.4)
NEUTROPHILS NFR BLD AUTO: 61.8 % — SIGNIFICANT CHANGE UP (ref 43–77)
NITRITE UR-MCNC: NEGATIVE — SIGNIFICANT CHANGE UP
NRBC # BLD: 0 /100 WBCS — SIGNIFICANT CHANGE UP (ref 0–0)
PH UR: 6 — SIGNIFICANT CHANGE UP (ref 5–8)
PLATELET # BLD AUTO: 284 K/UL — SIGNIFICANT CHANGE UP (ref 150–400)
POTASSIUM SERPL-MCNC: 4.4 MMOL/L — SIGNIFICANT CHANGE UP (ref 3.5–5.3)
POTASSIUM SERPL-SCNC: 4.4 MMOL/L — SIGNIFICANT CHANGE UP (ref 3.5–5.3)
PROT SERPL-MCNC: 7.7 GM/DL — SIGNIFICANT CHANGE UP (ref 6–8.3)
PROT UR-MCNC: NEGATIVE MG/DL — SIGNIFICANT CHANGE UP
RBC # BLD: 4.6 M/UL — SIGNIFICANT CHANGE UP (ref 3.8–5.2)
RBC # FLD: 13.4 % — SIGNIFICANT CHANGE UP (ref 10.3–14.5)
SODIUM SERPL-SCNC: 137 MMOL/L — SIGNIFICANT CHANGE UP (ref 135–145)
SP GR SPEC: 1.01 — SIGNIFICANT CHANGE UP (ref 1–1.03)
TROPONIN I, HIGH SENSITIVITY RESULT: 14 NG/L — SIGNIFICANT CHANGE UP
UROBILINOGEN FLD QL: 0.2 MG/DL — SIGNIFICANT CHANGE UP (ref 0.2–1)
WBC # BLD: 10.69 K/UL — HIGH (ref 3.8–10.5)
WBC # FLD AUTO: 10.69 K/UL — HIGH (ref 3.8–10.5)

## 2024-07-01 PROCEDURE — 99285 EMERGENCY DEPT VISIT HI MDM: CPT

## 2024-07-01 PROCEDURE — 93010 ELECTROCARDIOGRAM REPORT: CPT

## 2024-07-01 PROCEDURE — 71045 X-RAY EXAM CHEST 1 VIEW: CPT | Mod: 26

## 2024-07-01 RX ORDER — KETOROLAC TROMETHAMINE 30 MG/ML
15 INJECTION, SOLUTION INTRAMUSCULAR ONCE
Refills: 0 | Status: DISCONTINUED | OUTPATIENT
Start: 2024-07-01 | End: 2024-07-01

## 2024-07-01 RX ORDER — ATORVASTATIN CALCIUM 80 MG/1
1 TABLET, FILM COATED ORAL
Qty: 0 | Refills: 0 | DISCHARGE

## 2024-07-01 RX ADMIN — KETOROLAC TROMETHAMINE 15 MILLIGRAM(S): 30 INJECTION, SOLUTION INTRAMUSCULAR at 22:48

## 2024-07-02 VITALS
HEART RATE: 73 BPM | TEMPERATURE: 98 F | OXYGEN SATURATION: 97 % | SYSTOLIC BLOOD PRESSURE: 144 MMHG | DIASTOLIC BLOOD PRESSURE: 63 MMHG | RESPIRATION RATE: 16 BRPM

## 2024-07-02 PROBLEM — N18.30 CHRONIC KIDNEY DISEASE, STAGE 3 UNSPECIFIED: Chronic | Status: ACTIVE | Noted: 2022-07-13

## 2024-07-02 PROCEDURE — 71275 CT ANGIOGRAPHY CHEST: CPT | Mod: 26,MC

## 2024-07-02 PROCEDURE — 74177 CT ABD & PELVIS W/CONTRAST: CPT | Mod: 26,MC

## 2024-07-02 RX ORDER — KETOROLAC TROMETHAMINE 30 MG/ML
15 INJECTION, SOLUTION INTRAMUSCULAR ONCE
Refills: 0 | Status: DISCONTINUED | OUTPATIENT
Start: 2024-07-02 | End: 2024-07-02

## 2024-07-02 RX ADMIN — KETOROLAC TROMETHAMINE 15 MILLIGRAM(S): 30 INJECTION, SOLUTION INTRAMUSCULAR at 03:05

## 2024-07-04 LAB
CULTURE RESULTS: ABNORMAL
SPECIMEN SOURCE: SIGNIFICANT CHANGE UP

## 2024-07-06 RX ORDER — CEPHALEXIN 500 MG
1 CAPSULE ORAL
Qty: 10 | Refills: 0
Start: 2024-07-06 | End: 2024-07-10

## 2024-09-25 ENCOUNTER — EMERGENCY (EMERGENCY)
Facility: HOSPITAL | Age: 69
LOS: 0 days | Discharge: HOME | End: 2024-09-25
Attending: STUDENT IN AN ORGANIZED HEALTH CARE EDUCATION/TRAINING PROGRAM

## 2024-09-25 VITALS
HEIGHT: 62 IN | WEIGHT: 149.91 LBS | DIASTOLIC BLOOD PRESSURE: 62 MMHG | HEART RATE: 92 BPM | OXYGEN SATURATION: 96 % | SYSTOLIC BLOOD PRESSURE: 138 MMHG | RESPIRATION RATE: 20 BRPM | TEMPERATURE: 98 F

## 2024-09-25 VITALS
DIASTOLIC BLOOD PRESSURE: 61 MMHG | TEMPERATURE: 98 F | RESPIRATION RATE: 19 BRPM | SYSTOLIC BLOOD PRESSURE: 130 MMHG | HEART RATE: 87 BPM | OXYGEN SATURATION: 97 %

## 2024-09-25 DIAGNOSIS — H66.91 OTITIS MEDIA, UNSPECIFIED, RIGHT EAR: ICD-10-CM

## 2024-09-25 DIAGNOSIS — M65.332 TRIGGER FINGER, LEFT MIDDLE FINGER: ICD-10-CM

## 2024-09-25 DIAGNOSIS — E78.5 HYPERLIPIDEMIA, UNSPECIFIED: ICD-10-CM

## 2024-09-25 DIAGNOSIS — G89.29 OTHER CHRONIC PAIN: ICD-10-CM

## 2024-09-25 DIAGNOSIS — Z90.49 ACQUIRED ABSENCE OF OTHER SPECIFIED PARTS OF DIGESTIVE TRACT: Chronic | ICD-10-CM

## 2024-09-25 DIAGNOSIS — M79.669 PAIN IN UNSPECIFIED LOWER LEG: ICD-10-CM

## 2024-09-25 DIAGNOSIS — H92.02 OTALGIA, LEFT EAR: ICD-10-CM

## 2024-09-25 DIAGNOSIS — M65.331 TRIGGER FINGER, RIGHT MIDDLE FINGER: ICD-10-CM

## 2024-09-25 DIAGNOSIS — I12.9 HYPERTENSIVE CHRONIC KIDNEY DISEASE WITH STAGE 1 THROUGH STAGE 4 CHRONIC KIDNEY DISEASE, OR UNSPECIFIED CHRONIC KIDNEY DISEASE: ICD-10-CM

## 2024-09-25 DIAGNOSIS — M54.50 LOW BACK PAIN, UNSPECIFIED: ICD-10-CM

## 2024-09-25 DIAGNOSIS — E11.22 TYPE 2 DIABETES MELLITUS WITH DIABETIC CHRONIC KIDNEY DISEASE: ICD-10-CM

## 2024-09-25 LAB
FLUAV AG NPH QL: SIGNIFICANT CHANGE UP
FLUBV AG NPH QL: SIGNIFICANT CHANGE UP
SARS-COV-2 RNA SPEC QL NAA+PROBE: SIGNIFICANT CHANGE UP

## 2024-09-25 PROCEDURE — 73130 X-RAY EXAM OF HAND: CPT | Mod: 26,50

## 2024-09-25 PROCEDURE — 99284 EMERGENCY DEPT VISIT MOD MDM: CPT

## 2024-09-25 RX ORDER — LIDOCAINE/BENZALKONIUM/ALCOHOL
1 SOLUTION, NON-ORAL TOPICAL ONCE
Refills: 0 | Status: COMPLETED | OUTPATIENT
Start: 2024-09-25 | End: 2024-09-25

## 2024-09-25 RX ORDER — FLUTICASONE PROPIONATE 50 UG/1
1 SPRAY, METERED NASAL ONCE
Refills: 0 | Status: COMPLETED | OUTPATIENT
Start: 2024-09-25 | End: 2024-09-25

## 2024-09-25 RX ORDER — AMOXICILLIN AND CLAVULANATE POTASSIUM 250; 125 MG/1; MG/1
1 TABLET, FILM COATED ORAL
Qty: 20 | Refills: 0
Start: 2024-09-25 | End: 2024-10-04

## 2024-09-25 RX ORDER — IBUPROFEN 600 MG
1 TABLET ORAL
Qty: 8 | Refills: 0
Start: 2024-09-25 | End: 2024-09-28

## 2024-09-25 RX ORDER — AMOXICILLIN AND CLAVULANATE POTASSIUM 250; 125 MG/1; MG/1
1 TABLET, FILM COATED ORAL ONCE
Refills: 0 | Status: COMPLETED | OUTPATIENT
Start: 2024-09-25 | End: 2024-09-25

## 2024-09-25 RX ORDER — IBUPROFEN 600 MG
400 TABLET ORAL ONCE
Refills: 0 | Status: COMPLETED | OUTPATIENT
Start: 2024-09-25 | End: 2024-09-25

## 2024-09-25 RX ADMIN — Medication 400 MILLIGRAM(S): at 22:00

## 2024-09-25 RX ADMIN — Medication 1 PATCH: at 22:19

## 2024-09-25 RX ADMIN — FLUTICASONE PROPIONATE 1 SPRAY(S): 50 SPRAY, METERED NASAL at 22:16

## 2024-09-25 RX ADMIN — AMOXICILLIN AND CLAVULANATE POTASSIUM 1 TABLET(S): 250; 125 TABLET, FILM COATED ORAL at 22:00

## 2024-09-25 RX ADMIN — Medication 10 MILLIGRAM(S): at 22:16

## 2024-09-25 NOTE — ED PROVIDER NOTE - NSFOLLOWUPINSTRUCTIONS_ED_ALL_ED_FT
Rest, drink plenty of fluids.  Advance activity as tolerated.  Continue all previously prescribed medications as directed.  Follow up with your primary care physician in 48-72 hours- bring copies of your results.  Return to the ER for worsening or persistent symptoms, and/or ANY NEW OR CONCERNING SYMPTOMS. If you have issues obtaining follow up, please call: [phone] to obtain a doctor or specialist who takes your insurance in your area.  You may call [phone] to make an appointment with the internal medicine clinic. Rest, drink plenty of fluids.  Advance activity as tolerated.  Continue all previously prescribed medications as directed.  Follow up with your primary care physician in 48-72 hours- bring copies of your results.  Return to the ER for worsening or persistent symptoms, and/or ANY NEW OR CONCERNING SYMPTOMS. If you have issues obtaining follow up, please call: [phone] to obtain a doctor or specialist who takes your insurance in your area.  You may call [phone] to make an appointment with the internal medicine clinic.    You were seen today for ear pain - you were found to have signs of ear infection - take antibiotics twice a day x 7 days    Use steroid nasal spray once a day to relieve sinus pressure    You can take antihistamine allergy medication called cetirizine 10mg once a day     Follow up with primary care physician    Follow up with hand specialist regarding hand pain which is concerning for a condition called Trigger finger.

## 2024-09-25 NOTE — ED PROVIDER NOTE - CARE PLAN
1 Principal Discharge DX:	Right otitis media   Principal Discharge DX:	Right otitis media  Secondary Diagnosis:	Trigger finger

## 2024-09-25 NOTE — ED PROVIDER NOTE - PHYSICAL EXAMINATION
Gen: aox3, nad,   Head: NCAT  ENT: Airway patent, moist mucous membranes, nasal passageways clear, no pharyngeal erythema or exudates, uvula midline, no cervical lymphadenopathy. + erythema around left TM , R TM clear , no mastoid tenderness   Cardiac: Normal rate, normal rhythm   Respiratory: Lungs CTA B/L  Gastrointestinal: Abdomen soft, nontender, nondistended, no rebound, no guarding  MSK: No gross abnormalities, FROM of all four extremities, no edema, no calf tenderness   HEME: Extremities warm, pulses intact and symmetrical in all four extremities  Skin: No rashes, no lesions  Neuro: No gross neurologic deficits, normal speech, no gait abnormality

## 2024-09-25 NOTE — ED PROVIDER NOTE - PROVIDER TOKENS
PROVIDER:[TOKEN:[[id number]],FOLLOWUP:[4-6 Days]],PROVIDER:[TOKEN:[[id number]],FOLLOWUP:[4-6 Days]]

## 2024-09-25 NOTE — ED PROVIDER NOTE - CLINICAL SUMMARY MEDICAL DECISION MAKING FREE TEXT BOX
68-year-old female past medical history of hypertension, hyperlipidemia, diabetes, chronic kidney disease, who presents for evaluation of multiple medical complaints.  Patient complains of bilateral hand pain, and locking of the middle finger with bending and extension ongoing over the last 2 to 3 weeks.  She denies any falls or preceding trauma she denies any associated fever.  She reports.  Left ear pain over the last 3 days.  She also reports right ear itchiness and watery eyes for similar duration.  She denies any cough.  She reports diffuse lower extremity leg pain but that has been ongoing over the last 1 year.  She reports low back pain worse with sitting up and movement has been chronic as well.  [facility]  used but patient speaks a dialect called StuffBuff - ,  norman [id number] was able to translate     plan - left TM with surrounding erythema, tx for OM, suspected arthritis, hand pain consistent w/ trigger finger, needs outpt hand follow up, analgesia with low dose nsaid, pt tried tylenol at home with minimal relief, trial lorataine and flonase for allergy symptoms/watery eyes/itchy ear, check flu/covid swab, lidocaine patch low back, re-eval, if no emergent xray findings then dc with above plan 68-year-old female past medical history of hypertension, hyperlipidemia, diabetes, chronic kidney disease, who presents for evaluation of multiple medical complaints.  Patient complains of bilateral hand pain, and locking of the middle finger with bending and extension ongoing over the last 2 to 3 weeks.  She denies any falls or preceding trauma she denies any associated fever.  She reports.  Left ear pain over the last 3 days.  She also reports right ear itchiness and watery eyes for similar duration.  She denies any cough.  She reports diffuse lower extremity leg pain but that has been ongoing over the last 1 year.  She reports low back pain worse with sitting up and movement has been chronic as well.  [place]  used but patient speaks a dialect called [place] - ,  leigh annVivoxia [id number] was able to translate     plan - left TM with surrounding erythema, tx for OM, suspected arthritis, hand pain consistent w/ trigger finger, needs outpt hand follow up, analgesia with low dose nsaid, pt tried tylenol at home with minimal relief, trial lorataine and flonase for allergy symptoms/watery eyes/itchy ear, check flu/covid swab, lidocaine patch low back, re-eval, if no emergent xray findings then dc with above plan    RW RENAY Lipscomb: [address]00 69 y/o female with dm, htn, hld here with multiple complaints. Pt reassessed and reports feeling a little better as per daughter translating. Will treat with for otitis media with augmentin and treat with cetirzine for itchy eyes and motrin for bilateral hand pain. XR reviewed and no fracture or dislocation noted.   Pt stable to be discharged home and follow up with hand specialist and ortho. Jesse DALTON: 68-year-old female past medical history of hypertension, hyperlipidemia, diabetes, chronic kidney disease, who presents for evaluation of multiple medical complaints.  Patient complains of bilateral hand pain, and locking of the middle finger with bending and extension of middle finger,  ongoing over the last 2 to 3 weeks.  She denies any falls or preceding trauma she denies any associated fever.  She reports  Left ear pain over the last 3 days.  She also reports right ear itchiness and watery eyes for similar duration.  She denies any cough.  She reports diffuse lower extremity leg pain but that has been ongoing over the last 1 year.  She reports low back pain worse with sitting up and movement has been chronic as well. She denies numbeness. Pashto  used but patient speaks a dialect called [place] ,  leigh annrylie [id number] was able to translate   plan - left TM with surrounding erythema concerning for otitis media, tx for OM. Hand pain- suspected arthritis, check xray hands rule out fracture/ trauma. finger locking with movement consistent w/ trigger finger of middle fingers b/l, needs outpt hand follow up, will provide analgesia with low dose nsaid, pt tried tylenol at home with minimal relief, trial antihistamine and steroid nasal spray for relief of allergy symptoms/watery eyes/itchy ear, check flu/covid swab, lidocaine patch to  low back for msk pain, re-eval, if no emergent xray findings then dc with above plan. no signs of mastoiditis on exam.     TASIA Lipscomb: [address]00 69 y/o female with dm, htn, hld here with multiple complaints. Pt reassessed and reports feeling a little better as per daughter translating. Will treat with for otitis media with augmentin and treat with cetirzine for itchy eyes and motrin for bilateral hand pain. XR reviewed and no fracture or dislocation noted.   Pt stable to be discharged home and follow up with hand specialist and ortho.

## 2024-09-25 NOTE — ED ADULT NURSE NOTE - CAS EDP DISCH TYPE
Admitted/transferred from: [place] [id number] RN full   skin assessment completed by Maria L Lucas, JOAQUIM and Lenora Odonnell RN.  Skin assessment finding: issues found old lap sites x3 approximated, pink   Interventions/actions: skin interventions encourage ambulation, fluids, repositioning     Will continue to monitor.     Home

## 2024-09-25 NOTE — ED ADULT NURSE NOTE - CHIEF COMPLAINT QUOTE
Mode of arrival (squad #, walk in, police, etc) : walk in        Chief complaint(s): Vascular access problem, swelling of left arm. Arrival Note (brief scenario, treatment PTA, etc). : Pt reports recent IV antibiotic therapy through his PICC line in his left arm. Pt presents with swelling of his left arm.         C= \"Have you ever felt that you should Cut down on your drinking? \"  No  A= \"Have people Annoyed you by criticizing your drinking? \"  No  G= \"Have you ever felt bad or Guilty about your drinking? \"  No  E= \"Have you ever had a drink as an Eye-opener first thing in the morning to steady your nerves or to help a hangover? \"  No      Deferred []      Reason for deferring: N/A    *If yes to two or more: probable alcohol abuse. * Speaks Ludwigali  Moy Nieto son n law  numbness to bilateral hands x 15 days  hearing and painful ear x 5 days H/O DM CHOL HTN

## 2024-09-25 NOTE — ED PROVIDER NOTE - CARE PROVIDER_API CALL
Balbina Cage  Plastic Surgery  [address], [address]  [place] [address]  Phone: [phone]  Fax: [phone]  Follow Up Time: 4-6 Days    Inder Soriano  Orthopaedic Surgery  [address], [address]  [place] [address]  Phone: [phone]  Fax: [phone]  Follow Up Time: 4-6 Days

## 2024-09-25 NOTE — ED PROVIDER NOTE - PATIENT PORTAL LINK FT
You can access the FollowMyHealth Patient Portal offered by [facility] by registering at the following website: [email or web address]. By joining [employer]’s FollowMyHealth portal, you will also be able to view your health information using other applications (apps) compatible with our system.

## 2024-09-25 NOTE — ED ADULT TRIAGE NOTE - CHIEF COMPLAINT QUOTE
Speaks Ludwigali  Moy Nieto son n law  numbness to bilateral hands x 15 days  hearing and painful ear x 5 days H/O DM CHOL HTN

## 2024-10-25 NOTE — PHYSICAL THERAPY INITIAL EVALUATION ADULT - PHYSICAL ASSIST/NONPHYSICAL ASSIST: SCOOT/BRIDGE, REHAB EVAL
Patient/Caregiver provided printed discharge information. verbal cues/nonverbal cues (demo/gestures)/2 person assist

## (undated) DEVICE — MISONIX BONESCALPEL BLUNT BLADE & TUBESET 20MM

## (undated) DEVICE — DRSG AQUACEL 3.5 X 6"

## (undated) DEVICE — SUT VICRYL 2-0 18" CP-2 UNDYED (POP-OFF)

## (undated) DEVICE — SUT VICRYL 0 18" CT-1 UNDYED (POP-OFF)

## (undated) DEVICE — FRA-ESU BOVIE FORCE TRIAD T0E42286E: Type: DURABLE MEDICAL EQUIPMENT

## (undated) DEVICE — TAP RELINEO SOLID 5.5MM

## (undated) DEVICE — SUT NYLON 3-0 18" PS-2

## (undated) DEVICE — DRAPE C ARM 41X140"

## (undated) DEVICE — SUT MONOCRYL 3-0 27" PS-2 UNDYED

## (undated) DEVICE — MIDAS REX LEGEND MATCH HEAD FLUTED LG BORE 3.0MM X 14CM

## (undated) DEVICE — PACK POSTERIOR SPINAL FUSION

## (undated) DEVICE — SUT VICRYL 1 18" CT-1 UNDYED (POP-OFF)

## (undated) DEVICE — SUT HISTOACRYL BLUE

## (undated) DEVICE — FOLEY TRAY 16FR 5CC LF BAG CLOSED